# Patient Record
Sex: FEMALE | Race: WHITE | NOT HISPANIC OR LATINO | Employment: OTHER | URBAN - METROPOLITAN AREA
[De-identification: names, ages, dates, MRNs, and addresses within clinical notes are randomized per-mention and may not be internally consistent; named-entity substitution may affect disease eponyms.]

---

## 2017-01-11 ENCOUNTER — ALLSCRIPTS OFFICE VISIT (OUTPATIENT)
Dept: OTHER | Facility: OTHER | Age: 72
End: 2017-01-11

## 2017-07-19 ENCOUNTER — ALLSCRIPTS OFFICE VISIT (OUTPATIENT)
Dept: OTHER | Facility: OTHER | Age: 72
End: 2017-07-19

## 2017-07-19 DIAGNOSIS — Z13.820 ENCOUNTER FOR SCREENING FOR OSTEOPOROSIS: ICD-10-CM

## 2017-07-19 DIAGNOSIS — M85.80 OTHER SPECIFIED DISORDERS OF BONE DENSITY AND STRUCTURE, UNSPECIFIED SITE: ICD-10-CM

## 2017-07-19 DIAGNOSIS — I10 ESSENTIAL (PRIMARY) HYPERTENSION: ICD-10-CM

## 2017-07-19 DIAGNOSIS — E83.52 HYPERCALCEMIA: ICD-10-CM

## 2017-11-10 ENCOUNTER — ALLSCRIPTS OFFICE VISIT (OUTPATIENT)
Dept: OTHER | Facility: OTHER | Age: 72
End: 2017-11-10

## 2017-11-10 DIAGNOSIS — E83.52 HYPERCALCEMIA: ICD-10-CM

## 2017-11-10 DIAGNOSIS — E34.9 ENDOCRINE DISORDER: ICD-10-CM

## 2017-11-10 DIAGNOSIS — I10 ESSENTIAL (PRIMARY) HYPERTENSION: ICD-10-CM

## 2017-11-10 DIAGNOSIS — M85.80 OTHER SPECIFIED DISORDERS OF BONE DENSITY AND STRUCTURE, UNSPECIFIED SITE (CODE): ICD-10-CM

## 2017-11-11 NOTE — PROGRESS NOTES
Assessment    1  Benign essential hypertension (401 1) (I10)   2  Osteopenia (733 90) (M85 80)   3  Elevated parathyroid hormone (259 9) (E34 9)   4  Hypercalcemia (275 42) (E83 52)    Plan  Benign essential hypertension    · AmLODIPine Besylate 5 MG Oral Tablet (Norvasc); TAKE 1 TABLET DAILY   · Valsartan 160 MG Oral Tablet (Diovan); TAKE 1 TABLET ONCE DAILY  Benign essential hypertension, Elevated parathyroid hormone, Hypercalcemia, Osteopenia    · (1) CBC/PLT/DIFF; Status:Active; Requested for:10Nov2017;    · (1) COMPREHENSIVE METABOLIC PANEL; Status:Active; Requested for:10Nov2017;    · (1) LIPID PANEL, FASTING; Status:Active; Requested for:10Nov2017;    · (1) VITAMIN D 25-HYDROXY; Status:Active; Requested for:10Nov2017;   Benign essential hypertension, Hypercalcemia, Osteopenia    · (1) PHOSPHORUS; Status:Active; Requested for:10Nov2017;   Elevated parathyroid hormone, Hypercalcemia, Hypercholesterolemia, Osteopenia    · *1 - [facility]  *  Status: Active  Requested for: 22MIB9895  Care Summary provided  : Yes  PMH: Screening for osteoporosis    · * DXA BONE DENSITY SPINE HIP AND PELVIS; Status:Active; Requested for:10Nov2017;   PMH: Visit for screening mammogram    · * MAMMO SCREENING BILATERAL W CAD; Status:Active; Requested for:10Nov2017;     Discussion/Summary  Discussion Summary:   HTN: well controlled  continue to sporadically monitor BP  Goal BP < 140/90  continue with healthy diet, exercise as tolerated  PTH/Hypercalcemia: Pt stopped her HCTZ, vitamin D and oral CA supplement  ical and PTH with slight elevation  Pt wishes to f/u with endo, will place referral  Add phos to labs  Aware over due for dexa  to f/u in 4 months, sooner if needed  labs with pt  Pt aware needs to update dexa, echo and mammo     Counseling Documentation With Imm: The patient was counseled regarding diagnostic results,-- instructions for management,-- risk factor reductions,-- prognosis,-- patient and family education,-- impressions  Chief Complaint  Chief Complaint Free Text Note Form: Patient is here for a 4 m f/u visit for hypertension, hyperlipidemia and osteopenia  Review labs  Refill meds  Recommended a mammogram and DEXA scan  Advance Directives  Advance Directive [facility]:   The patient is in agreement to receive the Advance Care Planning service--   YES - Patient has an advance health care directive  The patient has a living will located  in patient's home--   Will bring in at next visit  History of Present Illness  HPI: Pt her for routine f/u and to review labs  Pt is doing well with no concerns  pt HCTZ, vitamin D and oral calcium was stopped  here to review ical and PTH  No recent DEXA - aware she should update  [facility] osteopenia  Hypertension (Follow-Up): The patient presents for follow-up of essential hypertension  The patient states she has been doing well with her blood pressure control since the last visit  She has no significant interval events  Symptoms: denies impaired vision,-- denies dyspnea,-- denies chest pain,-- denies intermittent leg claudication-- and-- denies lower extremity edema  Associated symptoms include no headache  Home monitoring: The patient checks her blood pressure sporadically  Blood pressure control has been good  Medications: the patient is adherent with her medication regimen  -- She denies medication side effects  The patient is due for an eye exam   Additional History: Due for an eye exam       Review of Systems  Complete-Female:  Constitutional: no fever,-- not feeling poorly-- and-- no chills  ENT: no earache,-- no sore throat-- and-- no nasal discharge  Cardiovascular: lower extremity edema, but-- the heart rate was not slow,-- no chest pain,-- no intermittent leg claudication,-- the heart rate was not fast-- and-- no palpitations  Respiratory: no shortness of breath,-- no cough-- and-- no shortness of breath during exertion    Gastrointestinal: no abdominal pain,-- no nausea,-- no vomiting-- and-- no constipation--   The patient presents with complaints of diarrhea (looser stools associated with stress)  Genitourinary: no dysuria  Integumentary: no rashes  Neurological: no headache,-- no confusion-- and-- no dizziness  Active Problems  1  Benign essential hypertension (401 1) (I10)   2  Family history of colon cancer (V16 0) (Z80 0)   3  Heart murmur (785 2) (R01 1)   4  History of colon polyps (V12 72) (Z86 010)   5  Hypercalcemia (275 42) (E83 52)   6  Hypercholesterolemia (272 0) (E78 00)   7  Osteopenia (733 90) (M85 80)    Past Medical History  1  History of Asymptomatic postmenopausal status (age-related) (natural) (V49 81) (Z78 0)   2  History of Encounter for screening colonoscopy (V76 51) (Z12 11)   3  History of Functional murmur (R01 0)   4  History of allergy (V15 09) (Z88 9)   5  History of dizziness (V13 89) (Z87 898)   6  History of hypertension (V12 59) (Z86 79)   7  History of screening mammography (V15 89) (Z92 89)   8  Denied: History of Mental Status Change   9  History of Pap smear for cervical cancer screening (V76 2) (Z12 4)   10  History of Screening for malignant neoplasm of breast (V76 10) (Z12 31)   11  History of Screening for osteoporosis (V82 81) (B86 218)  Active Problems And Past Medical History Reviewed: The active problems and past medical history were reviewed and updated today  Surgical History    1  History of  Section   2  History of Tonsillectomy With Adenoidectomy  Surgical History Reviewed: The surgical history was reviewed and updated today  Family History  Mother    1  Family history of Colon cancer   2  Denied: Family history of Crohn's disease without complication, unspecified gastrointestinal tract location   3  Family history of Family Health Status 4  Children Living   4  Family history of GI malignancy (V16 0) (Z80 0)   5  Denied: Family history of liver disease  Father    10  Denied: Family history of Colon cancer   7  Denied: Family history of Crohn's disease without complication, unspecified gastrointestinal tract location   8  Denied: Family history of liver disease  Family History    9  Family history of Colon Cancer (V16 0)   10  Family history of Family Health Status 4  Children Living  Family History Reviewed: The family history was reviewed and updated today  Social History     · Being A Social Drinker   · Caffeine Use   · Denied: History of Drug Use   · Exercising Regularly   · Marital History - Currently    · Never A Smoker   · Occupation:   · Recreational Activities  Social History Reviewed: The social history was reviewed and updated today  The social history was reviewed and is unchanged  Current Meds   1  AmLODIPine Besylate 5 MG Oral Tablet; TAKE 1 TABLET DAILY; Therapy: 15PVS7782 to (Evaluate:15Jan2018)  Requested for: 56KTM5758; Last Rx:74Sqz3410 Ordered   2  Biotin 5000 MCG Oral Capsule; TAKE 1 CAPSULE DAILY; Therapy: 94ZWJ0119 to Recorded   3  Boswellia Shanice Extract Powder; USE AS DIRECTED; Therapy: 86STH7467 to Recorded   4  Turmeric Curcumin Oral Capsule; TAKE 2 CAPSULE Daily; Therapy: 23XKM5673 to Recorded   5  Valsartan 160 MG Oral Tablet; TAKE 1 TABLET ONCE DAILY; Therapy: 73AWH7515 to (Evaluate:15Jan2018)  Requested for: 31HJN1479; Last Rx:44Aym2379 Ordered  Medication List Reviewed: The medication list was reviewed and updated today  Allergies  1  No Known Drug Allergies  2  No Known Environmental Allergies   3  No Known Food Allergies    Vitals  Vital Signs    Recorded: 39HKZ9893 03:37PM   Temperature 97 4 F, Oral   Heart Rate 51   Systolic 315, LUE, Sitting   Diastolic 70, LUE, Sitting   Height 5 ft 0 25 in   Weight 139 lb 2 oz   BMI Calculated 26 94   BSA Calculated 1 6   O2 Saturation 98       Physical Exam   Constitutional  General appearance: No acute distress, well appearing and well nourished     Eyes  Conjunctiva and lids: No swelling, erythema or discharge  Pupils and irises: Equal, round and reactive to light  Ears, Nose, Mouth, and Throat  Oropharynx: Normal with no erythema, edema, exudate or lesions  -- MMM  Pulmonary  Respiratory effort: No increased work of breathing or signs of respiratory distress  Auscultation of lungs: Clear to auscultation  -- no rhonchi or wheezing  Cardiovascular  Auscultation of heart: Normal rate and rhythm, normal S1 and S2, without murmurs  Examination of extremities for edema and/or varicosities: Abnormal  -- + trace to +1 B/L LE edema  Lymphatic  Palpation of lymph nodes in neck: No lymphadenopathy  Musculoskeletal  Gait and station: Normal    Skin  Skin and subcutaneous tissue: Normal without rashes or lesions  Neurologic no focal deficits  Psychiatric  Orientation to person, place, and time: Normal    Mood and affect: Normal          Results/Data  (1) COMPREHENSIVE METABOLIC PANEL [id number] 54:18GC Hector Prudent     Test Name Result Flag Reference   GLUCOSE,RANDM 102         Summary / No summary entered :    No summary entered  Documents attached :    [address] Work - [facility] pa, team; Enc: [id number] - Image Encounter - [facility] pa, team -    (Interventional Medicine) (Additional Information Document)    Health Management  History of colon polyps   COLONOSCOPY; every 3 years; Last 81RTV9866; Next Due: 94UNC7197; Active  History of Depression screening   *VB-Depression Screening; every 1 year; Last 11YTF1396; Next Due: 84LIK8479; Active  History of Screening for colon cancer   COLONOSCOPY (GI, SURG); every 3 years; Last 78XVB1244; Next Due: 81RBT6299; Active  History of Screening for genitourinary condition   *VB - Urinary Incontinence Screen (Dx Z13 89 Screen for UI); every 1 year; Last 23VPG8813; NextDue: 98IAM1326;  Active  History of Screening for neurological condition   *VB - Fall Risk Assessment  (Dx Z13 89 Screen for Neurologic Disorder); every 1 year; MWMU03GGP1000; Next Due: 43RXW3059; Active    Signatures   Electronically signed by :  Anton Camarillo; Nov 10 2017  4:52PM EST                       (Author)    Electronically signed by : Kalia Reilly MD; Nov 10 2017  6:14PM EST

## 2018-01-12 VITALS
BODY MASS INDEX: 27.58 KG/M2 | OXYGEN SATURATION: 97 % | DIASTOLIC BLOOD PRESSURE: 70 MMHG | TEMPERATURE: 98 F | WEIGHT: 140.5 LBS | HEIGHT: 60 IN | HEART RATE: 78 BPM | SYSTOLIC BLOOD PRESSURE: 110 MMHG

## 2018-01-13 VITALS
HEIGHT: 61 IN | OXYGEN SATURATION: 89 % | DIASTOLIC BLOOD PRESSURE: 62 MMHG | WEIGHT: 141.5 LBS | SYSTOLIC BLOOD PRESSURE: 118 MMHG | BODY MASS INDEX: 26.71 KG/M2 | TEMPERATURE: 97.7 F | HEART RATE: 89 BPM

## 2018-01-14 VITALS
DIASTOLIC BLOOD PRESSURE: 70 MMHG | BODY MASS INDEX: 27.32 KG/M2 | OXYGEN SATURATION: 98 % | SYSTOLIC BLOOD PRESSURE: 117 MMHG | TEMPERATURE: 97.4 F | HEIGHT: 60 IN | HEART RATE: 51 BPM | WEIGHT: 139.13 LBS

## 2018-01-16 NOTE — RESULT NOTES
Message    Colon polyps removed came back as tubular adenomas  Called PT - spoke to  (on CC) - provided DR results - input reminder - [address]      Repeat colonoscopy in 3 years    Spoke with pt, notified of results  Reminder set  tmc             Verified Results  (1) TISSUE EXAM [id number] 01:35PM Emperatriz Master     Test Name Result Flag Reference   LAB AP CASE REPORT (Report)     Surgical Pathology Report             Case: [id number]                   Authorizing Provider: Augustus Julien MD     Collected:      10/10/2016 1335        Ordering Location:   [facility]    Received:      10/10/2016 [address] [facility]                               Pathologist:      Marcia Garcia MD                              Specimens:  A) - Large Intestine, Right/Ascending Colon, Hot snare polypectomy proximal ascending         B) - Large Intestine, Right/Ascending Colon, Bx distal ascending polyp   LAB AP FINAL DIAGNOSIS (Report)     A  Colon, right, polypectomy:        - Tubular adenoma involving multiple tissue fragments         - No high-grade dysplasia or malignancy is identified  B  Colon, distal ascending, polypectomy:        - Tubular adenoma  - No high-grade dysplasia or malignancy is identified  Interpretation performed at , [address]   Electronically signed by Marcia Garcia MD on 10/12/2016 at 12:00 PM   LAB AP SURGICAL ADDITIONAL INFORMATION (Report)     These tests were developed and their performance characteristics   determined by [facility] or [address] [place] [address]  They may not be cleared or approved by the [place]  Food and   Drug Administration  The FDA has determined that such clearance or   approval is not necessary  These tests are used for clinical purposes  They should not be regarded as investigational or for research   This   laboratory has been approved by [facility], designated as a high-complexity   laboratory and is qualified to perform these tests  LAB AP GROSS DESCRIPTION (Report)     A  The specimen is received in formalin, labeled with the patient's name   and hospital number, and is designated hot snare polypectomy proximal   ascending, are multiple tan-brown polypoid structures measuring 1 8 x 0 7   x 0 2 cm in aggregate  Entirely submitted  One cassette  B  The specimen is received in formalin, labeled with the patient's name   and hospital number, and is designated biopsy distal ascending polyp,   are two irregularly shaped fragments of tan soft tissue measuring 0 3 and   0 2 cm in greatest dimension  Entirely submitted  One cassette  Note: The estimated total formalin fixation time based upon information   provided by the submitting clinician and the standard processing schedule   is 30 75 hours        RLR

## 2018-10-11 RX ORDER — AMLODIPINE BESYLATE 5 MG/1
TABLET ORAL
Qty: 90 TABLET | Refills: 1 | OUTPATIENT
Start: 2018-10-11

## 2018-10-24 RX ORDER — VALSARTAN 160 MG/1
TABLET ORAL
Qty: 90 TABLET | Refills: 1 | OUTPATIENT
Start: 2018-10-24

## 2018-10-30 ENCOUNTER — OFFICE VISIT (OUTPATIENT)
Dept: INTERNAL MEDICINE CLINIC | Age: 73
End: 2018-10-30
Payer: COMMERCIAL

## 2018-10-30 VITALS
TEMPERATURE: 98.2 F | OXYGEN SATURATION: 97 % | HEART RATE: 92 BPM | BODY MASS INDEX: 28.55 KG/M2 | WEIGHT: 141.6 LBS | HEIGHT: 59 IN | SYSTOLIC BLOOD PRESSURE: 128 MMHG | DIASTOLIC BLOOD PRESSURE: 82 MMHG

## 2018-10-30 DIAGNOSIS — J06.9 VIRAL UPPER RESPIRATORY TRACT INFECTION: ICD-10-CM

## 2018-10-30 DIAGNOSIS — E83.52 HYPERCALCEMIA: ICD-10-CM

## 2018-10-30 DIAGNOSIS — I10 ESSENTIAL HYPERTENSION: Primary | ICD-10-CM

## 2018-10-30 PROBLEM — M54.50 CHRONIC BILATERAL LOW BACK PAIN WITHOUT SCIATICA: Status: ACTIVE | Noted: 2018-10-30

## 2018-10-30 PROBLEM — G89.29 CHRONIC BILATERAL LOW BACK PAIN WITHOUT SCIATICA: Status: ACTIVE | Noted: 2018-10-30

## 2018-10-30 PROCEDURE — 1036F TOBACCO NON-USER: CPT | Performed by: INTERNAL MEDICINE

## 2018-10-30 PROCEDURE — 99214 OFFICE O/P EST MOD 30 MIN: CPT | Performed by: INTERNAL MEDICINE

## 2018-10-30 RX ORDER — LOSARTAN POTASSIUM 50 MG/1
50 TABLET ORAL DAILY
Qty: 30 TABLET | Refills: 0 | Status: SHIPPED | OUTPATIENT
Start: 2018-10-30 | End: 2018-10-30 | Stop reason: DRUGHIGH

## 2018-10-30 RX ORDER — AMLODIPINE BESYLATE 5 MG/1
5 TABLET ORAL DAILY
Qty: 90 TABLET | Refills: 1 | Status: SHIPPED | OUTPATIENT
Start: 2018-10-30 | End: 2019-04-29 | Stop reason: SDUPTHER

## 2018-10-30 RX ORDER — VALSARTAN 160 MG/1
160 TABLET ORAL DAILY
Qty: 90 TABLET | Refills: 1 | Status: CANCELLED | OUTPATIENT
Start: 2018-10-30

## 2018-10-30 RX ORDER — LOSARTAN POTASSIUM 100 MG/1
100 TABLET ORAL DAILY
Qty: 30 TABLET | Refills: 0 | Status: SHIPPED | OUTPATIENT
Start: 2018-10-30 | End: 2018-11-19 | Stop reason: SDUPTHER

## 2018-10-30 RX ORDER — VALSARTAN 160 MG/1
160 TABLET ORAL DAILY
Refills: 1 | COMMUNITY
Start: 2018-07-28 | End: 2018-10-30 | Stop reason: ALTCHOICE

## 2018-10-30 RX ORDER — LOSARTAN POTASSIUM 100 MG/1
100 TABLET ORAL DAILY
Refills: 0 | Status: CANCELLED | OUTPATIENT
Start: 2018-10-30

## 2018-10-30 NOTE — PROGRESS NOTES
Assessment/Plan:    Essential hypertension  - well controlled on valsartan and amlodipine  - patient's lab results were not available for review but she states that she will find the results and fax to our office for review tomorrow  - will discontinue valsartan 160 mg because it has been recalled and start patient on losartan 100 mg daily  - patient has been counseled to check her blood pressure twice daily, once in the morning and again in the evening and to keep a blood pressure log for at least 2 weeks and fax it to the office in 2 weeks  - will refill amlodipine  - follow-up in 3 months    Viral upper respiratory tract infection  - almost completely resolved  - continue with over-the-counter medication as needed    Hypercalcemia  - will review the results of her lab work when we get the results         Diagnoses and all orders for this visit:    Essential hypertension  -     amLODIPine (NORVASC) 5 mg tablet; Take 1 tablet (5 mg total) by mouth daily  -     Discontinue: losartan (COZAAR) 50 mg tablet; Take 1 tablet (50 mg total) by mouth daily  -     losartan (COZAAR) 100 MG tablet; Take 1 tablet (100 mg total) by mouth daily    Viral upper respiratory tract infection    Hypercalcemia    Other orders  -     Ascorbic Acid (VITAMIN C PO); Take 1 tablet by mouth daily  -     Discontinue: valsartan (DIOVAN) 160 mg tablet; Take 160 mg by mouth daily  -     Cancel: valsartan (DIOVAN) 160 mg tablet; Take 1 tablet (160 mg total) by mouth daily  -     Cancel: losartan (COZAAR) 100 MG tablet; Take 1 tablet (100 mg total) by mouth daily          Subjective:      Patient ID: Lula Ocampo is a 72 y o  female  HPI  Patient is here for follow-up visit regarding her hypertension  She needs a refill of her blood pressure medications-valsartan and amlodipine    She also wants to review the results of the blood work she did recently unfortunately, there is no record of the blood test results either on epic or in her medical records  She states that she had the blood work done in her work place  She has no complaints today  She states that she recently had symptoms of an upper respiratory tract infection with runny nose, stuffy nose, cough, sinus pressure but her symptoms have improved since she started taking vitamin-C  She admits to occasional arthralgias and some leg swelling but denies headaches, chest pains, fever, chills, night sweats, palpitations, nausea, vomiting, abdominal pain, diarrhea, constipation  She states that she was recently switched from valsartan HCT to plain valsartan because of low potassium levels but her blood pressure has been well controlled with the switch  The following portions of the patient's history were reviewed and updated as appropriate: allergies, current medications, past family history, past medical history, past social history, past surgical history and problem list     Review of Systems   Constitutional: Negative for activity change, chills, fatigue, fever and unexpected weight change  HENT: Negative for ear pain, postnasal drip, rhinorrhea, sinus pressure and sore throat  Eyes: Negative for pain  Respiratory: Negative for cough, choking, chest tightness, shortness of breath and wheezing  Cardiovascular: Negative for chest pain, palpitations and leg swelling  Gastrointestinal: Negative for abdominal pain, constipation, diarrhea, nausea and vomiting  Genitourinary: Negative for dysuria and hematuria  Musculoskeletal: Positive for arthralgias  Negative for back pain, gait problem, joint swelling, myalgias and neck stiffness  Skin: Negative for pallor and rash  Neurological: Negative for dizziness, tremors, seizures, syncope, light-headedness and headaches  Hematological: Negative for adenopathy  Psychiatric/Behavioral: Negative for behavioral problems           Past Medical History:   Diagnosis Date    Arthritis     Family history of colon cancer in mother  Functional murmur     Hypertension     Dx'd in , controlled     Osteoporosis          Current Outpatient Prescriptions:     Ascorbic Acid (VITAMIN C PO), Take 1 tablet by mouth daily, Disp: , Rfl:     amLODIPine (NORVASC) 5 mg tablet, Take 1 tablet (5 mg total) by mouth daily, Disp: 90 tablet, Rfl: 1    losartan (COZAAR) 100 MG tablet, Take 1 tablet (100 mg total) by mouth daily, Disp: 30 tablet, Rfl: 0    No Known Allergies    Social History   Past Surgical History:   Procedure Laterality Date     SECTION      x2,  &     FLEXIBLE SIGMOIDOSCOPY      MA COLONOSCOPY FLX DX W/COLLJ Sokokirsten  PFRMD N/A 10/10/2016    Procedure: COLONOSCOPY;  Surgeon: Naheed Tijerina MD;  [facility]; Service: Gastroenterology    TOE SURGERY Bilateral     hammer toe correction surgery    TONSILLECTOMY      with Adenoidectomy     Family History   Problem Relation Age of Onset    Colon cancer Mother 68    Colon cancer Family        Objective:  /82 (BP Location: Left arm, Patient Position: Sitting, Cuff Size: Standard)   Pulse 92   Temp 98 2 °F (36 8 °C) (Tympanic)   Ht 4' 11 45" (1 51 m)   Wt 64 2 kg (141 lb 9 6 oz)   SpO2 97%   BMI 28 17 kg/m²        Physical Exam   Constitutional: She is oriented to person, place, and time  She appears well-developed and well-nourished  No distress  HENT:   Head: Normocephalic and atraumatic  Right Ear: External ear normal    Left Ear: External ear normal    Nose: Nose normal    Mouth/Throat: Oropharynx is clear and moist  No oropharyngeal exudate  Nasal mucosa edema and mild erythema   Eyes: Pupils are equal, round, and reactive to light  Conjunctivae and EOM are normal  Right eye exhibits no discharge  Left eye exhibits no discharge  No scleral icterus  Neck: Normal range of motion  Neck supple  No JVD present  No tracheal deviation present  No thyromegaly present  Cardiovascular: Normal rate, regular rhythm and intact distal pulses  Exam reveals no gallop and no friction rub  Murmur (2/6 systolic murmur maximal in aortic valve region) heard  Pulmonary/Chest: Effort normal and breath sounds normal  No respiratory distress  She has no wheezes  She has no rales  She exhibits no tenderness  Abdominal: Soft  Bowel sounds are normal  She exhibits no distension and no mass  There is no tenderness  There is no rebound and no guarding  Musculoskeletal: Normal range of motion  She exhibits edema (1+ pitting pedal edema on bilateral lower extremities extending up to mid shins)  She exhibits no tenderness or deformity  Lymphadenopathy:     She has no cervical adenopathy  Neurological: She is alert and oriented to person, place, and time  She has normal reflexes  No cranial nerve deficit  She exhibits normal muscle tone  Coordination normal    Skin: Skin is warm and dry  No rash noted  She is not diaphoretic  No erythema  No pallor  Psychiatric: She has a normal mood and affect   Her behavior is normal

## 2018-11-06 ENCOUNTER — TELEPHONE (OUTPATIENT)
Dept: INTERNAL MEDICINE CLINIC | Facility: CLINIC | Age: 73
End: 2018-11-06

## 2018-11-06 DIAGNOSIS — Z91.89 AT RISK FOR HEPATOTOXICITY: ICD-10-CM

## 2018-11-06 DIAGNOSIS — E78.49 OTHER HYPERLIPIDEMIA: Primary | ICD-10-CM

## 2018-11-06 PROBLEM — E55.9 VITAMIN D INSUFFICIENCY: Status: ACTIVE | Noted: 2018-11-06

## 2018-11-06 NOTE — TELEPHONE ENCOUNTER
I called and spoke to patient about her lab results  She has vit D insufficiency and hyperlipidemia  We will check her LFT and pt will be started on atorvastatin 10-20 mg if her liver function is normal because her calculated 10 year ASCVD risk is 11 8%  Pt will start OTC vit D 2000 units daily  We will recheck her vit level two months and her LFT again one month after she starts the statin

## 2018-11-08 ENCOUNTER — TELEPHONE (OUTPATIENT)
Dept: INTERNAL MEDICINE CLINIC | Age: 73
End: 2018-11-08

## 2018-11-16 NOTE — PROGRESS NOTES
[facility] Dr Dean Low  I just wanted to make you aware of these results as you saw this patient 10/30  Please let me know if there is anything that you need    Thanks Lacey Cooper

## 2018-11-19 ENCOUNTER — TELEPHONE (OUTPATIENT)
Dept: INTERNAL MEDICINE CLINIC | Age: 73
End: 2018-11-19

## 2018-11-19 ENCOUNTER — OFFICE VISIT (OUTPATIENT)
Dept: INTERNAL MEDICINE CLINIC | Age: 73
End: 2018-11-19
Payer: COMMERCIAL

## 2018-11-19 VITALS
OXYGEN SATURATION: 97 % | WEIGHT: 140.4 LBS | HEART RATE: 83 BPM | HEIGHT: 59 IN | TEMPERATURE: 98.4 F | SYSTOLIC BLOOD PRESSURE: 120 MMHG | DIASTOLIC BLOOD PRESSURE: 70 MMHG | BODY MASS INDEX: 28.3 KG/M2

## 2018-11-19 DIAGNOSIS — I10 ESSENTIAL HYPERTENSION: Primary | ICD-10-CM

## 2018-11-19 PROCEDURE — 99214 OFFICE O/P EST MOD 30 MIN: CPT | Performed by: NURSE PRACTITIONER

## 2018-11-19 PROCEDURE — 1160F RVW MEDS BY RX/DR IN RCRD: CPT | Performed by: NURSE PRACTITIONER

## 2018-11-19 PROCEDURE — 3074F SYST BP LT 130 MM HG: CPT | Performed by: NURSE PRACTITIONER

## 2018-11-19 PROCEDURE — 3008F BODY MASS INDEX DOCD: CPT | Performed by: NURSE PRACTITIONER

## 2018-11-19 PROCEDURE — 3078F DIAST BP <80 MM HG: CPT | Performed by: NURSE PRACTITIONER

## 2018-11-19 RX ORDER — LOSARTAN POTASSIUM 50 MG/1
50 TABLET ORAL DAILY
COMMUNITY
End: 2018-11-19 | Stop reason: CLARIF

## 2018-11-19 RX ORDER — LOSARTAN POTASSIUM 100 MG/1
100 TABLET ORAL DAILY
Qty: 30 TABLET | Refills: 0 | Status: SHIPPED | OUTPATIENT
Start: 2018-11-19 | End: 2018-12-05 | Stop reason: SDUPTHER

## 2018-11-19 RX ORDER — HYDROCHLOROTHIAZIDE 12.5 MG/1
12.5 TABLET ORAL DAILY
Qty: 30 TABLET | Refills: 0 | Status: SHIPPED | OUTPATIENT
Start: 2018-11-19 | End: 2018-12-05 | Stop reason: SDUPTHER

## 2018-11-19 NOTE — TELEPHONE ENCOUNTER
Patient called and stated that ever since her BP med Valsartan 160--was changed to -Losartan 100mg once a day, 50mg once a day    BP has been in the 131-149/100-115 range    PLEASE CALL to let her know what she can do with her medication to lower it

## 2018-11-19 NOTE — PROGRESS NOTES
Assessment/Plan:    Essential hypertension   Patient's blood pressure stable at 120/70, manual /70  Patient is to continue to just take losartan 100 milligram tablet daily with hydrochlorothiazide 12 5 milligram tablet daily  Patient was mistakenly taken 150 milligrams of losartan daily, and I believe this was causing some dizziness  I did advise patient to change positions slowly  Due to starting hydrochlorothiazide will check a BNP prior to her follow-up in 2 weeks  Patient's blood pressure cuff in accurate,  Home blood pressure cuff checked while in office today,   May be inaccurate due to murmur  Did advise patient to follow-up with cardiology as she has seen them in the past  Continue to monitor blood pressure at home  Goal BP is < 130/80  Contact our office for consistent elevations  Recommend low sodium diet  Exercise 30 minutes three times a week as tolerated  Recommend yearly eye exam          Diagnoses and all orders for this visit:    Essential hypertension  -     hydrochlorothiazide (HYDRODIURIL) 12 5 mg tablet; Take 1 tablet (12 5 mg total) by mouth daily  -     losartan (COZAAR) 100 MG tablet; Take 1 tablet (100 mg total) by mouth daily  -     Basic metabolic panel; Future    Other orders  -     Discontinue: losartan (COZAAR) 50 mg tablet; Take 50 mg by mouth daily          Subjective:      Patient ID: Katherine Soriano is a 72 y o  female  Patient presents today to follow-up on hypertension, patient is currently taking losartan 100 mg tablet daily, and amlodipine 5 mg tablet daily  Patient's blood pressure medication was changed on 10/30/18 at appointment with Dr Dori Schirmer  Due to valsartan recall, patient was originally taking valsartan 160 mg and was changed to losartan 100 mg once a day  Patient states her BP has been in the 131-149/100-115 range when monitored at home     Patient states that she has been taking losartan 100 milligrams and losartan 50 milligrams for total of 150 milligrams every day  Dizzy lately when she bends over and stands up  Patient's home blood pressure cuff checked while in office today, blood pressure reading from patient's home cuff was 164/108, manual blood pressure 130/70  Hypertension   This is a chronic problem  The current episode started more than 1 year ago  The problem has been gradually improving since onset  The problem is controlled  Pertinent negatives include no anxiety, blurred vision, chest pain, headaches, malaise/fatigue, neck pain, orthopnea, palpitations, peripheral edema, PND, shortness of breath or sweats  Risk factors for coronary artery disease include sedentary lifestyle and dyslipidemia  Treatments tried:   Patient currently taking amlodipine as well as losartan  The current treatment provides mild improvement  Compliance problems include diet and exercise  The following portions of the patient's history were reviewed and updated as appropriate: allergies, current medications, past family history, past medical history, past social history, past surgical history and problem list     Review of Systems   Constitutional: Negative for activity change, appetite change, chills, diaphoresis, fever and malaise/fatigue  HENT: Negative for congestion, ear discharge, ear pain, postnasal drip, rhinorrhea, sinus pain, sinus pressure and sore throat  Eyes: Negative for blurred vision, pain, discharge, itching and visual disturbance  Respiratory: Negative for cough, chest tightness, shortness of breath and wheezing  Cardiovascular: Negative for chest pain, palpitations, orthopnea, leg swelling and PND  Gastrointestinal: Negative for abdominal pain, constipation, diarrhea, nausea and vomiting  Endocrine: Negative for polydipsia, polyphagia and polyuria  Genitourinary: Negative for difficulty urinating, dysuria and urgency  Musculoskeletal: Negative for arthralgias, back pain and neck pain     Skin: Negative for rash and wound    Neurological: Negative for dizziness, weakness, numbness and headaches  Past Medical History:   Diagnosis Date    Arthritis     Family history of colon cancer in mother     Functional murmur     Hypertension     Dx'd in , controlled     Osteoporosis          Current Outpatient Prescriptions:     amLODIPine (NORVASC) 5 mg tablet, Take 1 tablet (5 mg total) by mouth daily, Disp: 90 tablet, Rfl: 1    Ascorbic Acid (VITAMIN C PO), Take 1 tablet by mouth daily, Disp: , Rfl:     losartan (COZAAR) 100 MG tablet, Take 1 tablet (100 mg total) by mouth daily, Disp: 30 tablet, Rfl: 0    hydrochlorothiazide (HYDRODIURIL) 12 5 mg tablet, Take 1 tablet (12 5 mg total) by mouth daily, Disp: 30 tablet, Rfl: 0    No Known Allergies    Social History   Past Surgical History:   Procedure Laterality Date     SECTION      x2,  &     FLEXIBLE SIGMOIDOSCOPY      [place] COLONOSCOPY FLX DX W/COLLJ Adriana  PFRMD N/A 10/10/2016    Procedure: COLONOSCOPY;  Surgeon: Makeda Angel MD;  Location: [facility]; Service: Gastroenterology    TOE SURGERY Bilateral     hammer toe correction surgery    TONSILLECTOMY      with Adenoidectomy     Family History   Problem Relation Age of Onset    Colon cancer Mother 68    Colon cancer Family        Objective:  /70 (BP Location: Left arm, Patient Position: Sitting, Cuff Size: Adult)   Pulse 83   Temp 98 4 °F (36 9 °C) (Tympanic)   Ht 4' 11" (1 499 m)   Wt 63 7 kg (140 lb 6 4 oz)   SpO2 97%   BMI 28 36 kg/m²     No results found for this or any previous visit (from the past 1344 hour(s))  Physical Exam   Constitutional: She is oriented to person, place, and time  She appears well-developed and well-nourished  No distress  HENT:   Head: Normocephalic and atraumatic  Right Ear: External ear normal    Left Ear: External ear normal    Nose: Nose normal    Mouth/Throat: Oropharynx is clear and moist  No oropharyngeal exudate     Eyes: Pupils are equal, round, and reactive to light  Conjunctivae and EOM are normal  Right eye exhibits no discharge  Left eye exhibits no discharge  No scleral icterus  Neck: Normal range of motion  Neck supple  No JVD present  No tracheal deviation present  No thyromegaly present  Cardiovascular: Normal rate, regular rhythm and intact distal pulses  Exam reveals no gallop and no friction rub  Murmur (2/6 systolic murmur maximal in aortic valve region) heard  Pulmonary/Chest: Effort normal and breath sounds normal  No respiratory distress  She has no wheezes  She has no rales  She exhibits no tenderness  Abdominal: Soft  Bowel sounds are normal  She exhibits no distension and no mass  There is no tenderness  There is no rebound and no guarding  Musculoskeletal: Normal range of motion  She exhibits edema (1+ pitting pedal edema on bilateral lower extremities extending up to mid shins)  She exhibits no tenderness or deformity  Lymphadenopathy:     She has no cervical adenopathy  Neurological: She is alert and oriented to person, place, and time  She has normal reflexes  No cranial nerve deficit  She exhibits normal muscle tone  Coordination normal    Skin: Skin is warm and dry  No rash noted  She is not diaphoretic  No erythema  No pallor  Psychiatric: She has a normal mood and affect   Her behavior is normal

## 2018-11-19 NOTE — TELEPHONE ENCOUNTER
Please see if she can come in today for an appointment   Please advise her to bring her blood pressure log with her

## 2018-11-19 NOTE — TELEPHONE ENCOUNTER
Spoke with the patient and she will be coming in for today at 3:30 p m  At the bath office with Samra Lemus      Patient will be bring her blood pressure machine and the log that she has taken so far on the blood pressure

## 2018-11-19 NOTE — ASSESSMENT & PLAN NOTE
Patient's blood pressure stable at 120/70, manual /70  Patient is to continue to just take losartan 100 milligram tablet daily with hydrochlorothiazide 12 5 milligram tablet daily  Patient was mistakenly taken 150 milligrams of losartan daily, and I believe this was causing some dizziness  I did advise patient to change positions slowly  Due to starting hydrochlorothiazide will check a BNP prior to her follow-up in 2 weeks  Patient's blood pressure cuff in accurate,  Home blood pressure cuff checked while in office today,   May be inaccurate due to murmur  Did advise patient to follow-up with cardiology as she has seen them in the past  Continue to monitor blood pressure at home  Goal BP is < 130/80  Contact our office for consistent elevations  Recommend low sodium diet  Exercise 30 minutes three times a week as tolerated    Recommend yearly eye exam

## 2018-12-05 ENCOUNTER — OFFICE VISIT (OUTPATIENT)
Dept: INTERNAL MEDICINE CLINIC | Age: 73
End: 2018-12-05
Payer: COMMERCIAL

## 2018-12-05 VITALS
HEART RATE: 88 BPM | OXYGEN SATURATION: 98 % | HEIGHT: 59 IN | DIASTOLIC BLOOD PRESSURE: 82 MMHG | SYSTOLIC BLOOD PRESSURE: 117 MMHG | BODY MASS INDEX: 28.18 KG/M2 | WEIGHT: 139.8 LBS | TEMPERATURE: 97.4 F

## 2018-12-05 DIAGNOSIS — I10 ESSENTIAL HYPERTENSION: ICD-10-CM

## 2018-12-05 DIAGNOSIS — M81.0 OSTEOPOROSIS, UNSPECIFIED OSTEOPOROSIS TYPE, UNSPECIFIED PATHOLOGICAL FRACTURE PRESENCE: Primary | ICD-10-CM

## 2018-12-05 DIAGNOSIS — E78.49 OTHER HYPERLIPIDEMIA: ICD-10-CM

## 2018-12-05 DIAGNOSIS — Z12.39 BREAST CANCER SCREENING: ICD-10-CM

## 2018-12-05 DIAGNOSIS — Z13.820 SCREENING FOR OSTEOPOROSIS: ICD-10-CM

## 2018-12-05 DIAGNOSIS — E55.9 VITAMIN D INSUFFICIENCY: ICD-10-CM

## 2018-12-05 DIAGNOSIS — Z12.39 SCREENING FOR BREAST CANCER: ICD-10-CM

## 2018-12-05 PROCEDURE — 99213 OFFICE O/P EST LOW 20 MIN: CPT | Performed by: NURSE PRACTITIONER

## 2018-12-05 PROCEDURE — 3074F SYST BP LT 130 MM HG: CPT | Performed by: NURSE PRACTITIONER

## 2018-12-05 PROCEDURE — 3079F DIAST BP 80-89 MM HG: CPT | Performed by: NURSE PRACTITIONER

## 2018-12-05 PROCEDURE — 1160F RVW MEDS BY RX/DR IN RCRD: CPT | Performed by: NURSE PRACTITIONER

## 2018-12-05 PROCEDURE — 3008F BODY MASS INDEX DOCD: CPT | Performed by: NURSE PRACTITIONER

## 2018-12-05 RX ORDER — LOSARTAN POTASSIUM 100 MG/1
100 TABLET ORAL DAILY
Qty: 90 TABLET | Refills: 1 | Status: SHIPPED | OUTPATIENT
Start: 2018-12-05 | End: 2019-05-07 | Stop reason: SDUPTHER

## 2018-12-05 RX ORDER — HYDROCHLOROTHIAZIDE 12.5 MG/1
12.5 TABLET ORAL DAILY
Qty: 90 TABLET | Refills: 1 | Status: SHIPPED | OUTPATIENT
Start: 2018-12-05 | End: 2019-05-07 | Stop reason: SDUPTHER

## 2018-12-05 NOTE — ASSESSMENT & PLAN NOTE
Patient's blood stable  Patient is to continue to take losartan 100 milligram tablet daily with hydrochlorothiazide 12 5 milligram tablet daily  Patient did get blood work done prior to appointment today however we have yet to receive the results, patient states she  Will send over BMP results  Continue to monitor blood pressure at home  Goal BP is < 130/80  Contact our office for consistent elevations  Recommend low sodium diet  Exercise 30 minutes three times a week as tolerated    Recommend yearly eye exam

## 2018-12-05 NOTE — ASSESSMENT & PLAN NOTE
Patient will continues take fish oil 2000 mg daily, will get a repeat lipid panel at patient's follow-up appointment in approximately 3 months  Recommend healthy lifestyle choices for your cholesterol  Low fat/low cholesterol diet  Limit/avoid red meat  Eat more lean meat - chicken breast, ground turkey, fish  Exercise 30 mins at least 3 times a week as tolerated

## 2018-12-05 NOTE — PROGRESS NOTES
Assessment/Plan:    Essential hypertension   Patient's blood stable  Patient is to continue to take losartan 100 milligram tablet daily with hydrochlorothiazide 12 5 milligram tablet daily  Patient did get blood work done prior to appointment today however we have yet to receive the results, patient states she  Will send over BMP results  Continue to monitor blood pressure at home  Goal BP is < 130/80  Contact our office for consistent elevations  Recommend low sodium diet  Exercise 30 minutes three times a week as tolerated  Recommend yearly eye exam       Vitamin D insufficiency  Patient will continue with vit D supplementation  Other hyperlipidemia   Patient will continues take fish oil 2000 mg daily, will get a repeat lipid panel at patient's follow-up appointment in approximately 3 months  Recommend healthy lifestyle choices for your cholesterol  Low fat/low cholesterol diet  Limit/avoid red meat  Eat more lean meat - chicken breast, ground turkey, fish  Exercise 30 mins at least 3 times a week as tolerated  Diagnoses and all orders for this visit:    Osteoporosis, unspecified osteoporosis type, unspecified pathological fracture presence    Breast cancer screening    Essential hypertension  -     losartan (COZAAR) 100 MG tablet; Take 1 tablet (100 mg total) by mouth daily  -     hydrochlorothiazide (HYDRODIURIL) 12 5 mg tablet; Take 1 tablet (12 5 mg total) by mouth daily    Screening for osteoporosis  -     DXA bone density spine hip and pelvis; Future    Screening for breast cancer  -     Mammo screening bilateral w cad; Future    Vitamin D insufficiency    Other hyperlipidemia    Other orders  -     Cancel: Mammo screening bilateral w cad; Future  -     Cancel: DXA bone density spine hip and pelvis; Future          Subjective:      Patient ID: Catalina Maki is a 72 y o  female        Patient presents today to follow-up on hypertension, patient is currently taking losartan 100 mg tablet daily, and amlodipine 5 mg tablet daily and   Hydrochlorothiazide 12 5 mg  Patient states she is feeling much better on this regimen  She states that the swelling in her ankles has gone down, and the dizziness has completely resolved  Hypertension   This is a chronic problem  The current episode started more than 1 year ago  The problem is unchanged  The problem is controlled  Pertinent negatives include no anxiety, blurred vision, chest pain, headaches, malaise/fatigue, neck pain, orthopnea, palpitations, peripheral edema, PND, shortness of breath or sweats  Risk factors for coronary artery disease include sedentary lifestyle and dyslipidemia  Treatments tried:   Patient currently taking amlodipine as well as losartan  And hydrochlorothiazide  The current treatment provides mild improvement  Compliance problems include diet and exercise  The following portions of the patient's history were reviewed and updated as appropriate: allergies, current medications, past family history, past medical history, past social history, past surgical history and problem list     Review of Systems   Constitutional: Negative for activity change, appetite change, chills, diaphoresis, fever and malaise/fatigue  HENT: Negative for congestion, ear discharge, ear pain, postnasal drip, rhinorrhea, sinus pain, sinus pressure and sore throat  Eyes: Negative for blurred vision, pain, discharge, itching and visual disturbance  Respiratory: Negative for cough, chest tightness, shortness of breath and wheezing  Cardiovascular: Negative for chest pain, palpitations, orthopnea, leg swelling and PND  Gastrointestinal: Negative for abdominal pain, constipation, diarrhea, nausea and vomiting  Endocrine: Negative for polydipsia, polyphagia and polyuria  Genitourinary: Negative for difficulty urinating, dysuria and urgency  Musculoskeletal: Negative for arthralgias, back pain and neck pain     Skin: Negative for rash and wound  Neurological: Negative for dizziness, weakness, numbness and headaches  Past Medical History:   Diagnosis Date    Arthritis     Family history of colon cancer in mother     Functional murmur     Hypertension     Dx'd in , controlled     Osteoporosis          Current Outpatient Prescriptions:     amLODIPine (NORVASC) 5 mg tablet, Take 1 tablet (5 mg total) by mouth daily, Disp: 90 tablet, Rfl: 1    Ascorbic Acid (VITAMIN C PO), Take 1 tablet by mouth daily, Disp: , Rfl:     hydrochlorothiazide (HYDRODIURIL) 12 5 mg tablet, Take 1 tablet (12 5 mg total) by mouth daily, Disp: 90 tablet, Rfl: 1    losartan (COZAAR) 100 MG tablet, Take 1 tablet (100 mg total) by mouth daily, Disp: 90 tablet, Rfl: 1    No Known Allergies    Social History   Past Surgical History:   Procedure Laterality Date     SECTION      x2,  &     FLEXIBLE SIGMOIDOSCOPY      [place] COLONOSCOPY FLX DX W/COLLJ Adriana  PFRMD N/A 10/10/2016    Procedure: COLONOSCOPY;  Surgeon: Zoie Hammond MD;  Location: [facility]; Service: Gastroenterology    TOE SURGERY Bilateral     hammer toe correction surgery    TONSILLECTOMY      with Adenoidectomy     Family History   Problem Relation Age of Onset    Colon cancer Mother 68    Colon cancer Family        Objective:  /82 (BP Location: Left arm, Patient Position: Sitting, Cuff Size: Standard)   Pulse 88   Temp (!) 97 4 °F (36 3 °C) (Tympanic)   Ht 4' 11 25" (1 505 m)   Wt 63 4 kg (139 lb 12 8 oz)   SpO2 98%   BMI 28 00 kg/m²     No results found for this or any previous visit (from the past 1344 hour(s))  Physical Exam   Constitutional: She is oriented to person, place, and time  She appears well-developed and well-nourished  No distress  HENT:   Head: Normocephalic and atraumatic     Right Ear: External ear normal    Left Ear: External ear normal    Nose: Nose normal    Mouth/Throat: Oropharynx is clear and moist  No oropharyngeal exudate  Eyes: Pupils are equal, round, and reactive to light  Conjunctivae and EOM are normal  Right eye exhibits no discharge  Left eye exhibits no discharge  No scleral icterus  Neck: Normal range of motion  Neck supple  No JVD present  No tracheal deviation present  No thyromegaly present  Cardiovascular: Normal rate, regular rhythm and intact distal pulses  Exam reveals no gallop and no friction rub  Murmur (2/6 systolic murmur maximal in aortic valve region) heard  Pulmonary/Chest: Effort normal and breath sounds normal  No respiratory distress  She has no wheezes  She has no rales  She exhibits no tenderness  Abdominal: Soft  Bowel sounds are normal  She exhibits no distension and no mass  There is no tenderness  There is no rebound and no guarding  Musculoskeletal: Normal range of motion  She exhibits edema ( trace edema to bilateral lower extremities)  She exhibits no tenderness or deformity  Lymphadenopathy:     She has no cervical adenopathy  Neurological: She is alert and oriented to person, place, and time  She has normal reflexes  No cranial nerve deficit  She exhibits normal muscle tone  Coordination normal    Skin: Skin is warm and dry  No rash noted  She is not diaphoretic  No erythema  No pallor  Psychiatric: She has a normal mood and affect   Her behavior is normal

## 2019-01-26 DIAGNOSIS — I10 ESSENTIAL HYPERTENSION: ICD-10-CM

## 2019-01-26 RX ORDER — AMLODIPINE BESYLATE 5 MG/1
TABLET ORAL
Qty: 90 TABLET | Refills: 1 | OUTPATIENT
Start: 2019-01-26

## 2019-04-26 DIAGNOSIS — I10 ESSENTIAL HYPERTENSION: ICD-10-CM

## 2019-04-27 RX ORDER — AMLODIPINE BESYLATE 5 MG/1
TABLET ORAL
Qty: 90 TABLET | Refills: 1 | OUTPATIENT
Start: 2019-04-27

## 2019-04-29 DIAGNOSIS — I10 ESSENTIAL HYPERTENSION: ICD-10-CM

## 2019-04-29 RX ORDER — AMLODIPINE BESYLATE 5 MG/1
5 TABLET ORAL DAILY
Qty: 90 TABLET | Refills: 0 | Status: SHIPPED | OUTPATIENT
Start: 2019-04-29 | End: 2019-09-24 | Stop reason: ALTCHOICE

## 2019-05-07 ENCOUNTER — OFFICE VISIT (OUTPATIENT)
Dept: INTERNAL MEDICINE CLINIC | Age: 74
End: 2019-05-07
Payer: COMMERCIAL

## 2019-05-07 ENCOUNTER — TELEPHONE (OUTPATIENT)
Dept: INTERNAL MEDICINE CLINIC | Age: 74
End: 2019-05-07

## 2019-05-07 VITALS
SYSTOLIC BLOOD PRESSURE: 108 MMHG | BODY MASS INDEX: 28.04 KG/M2 | OXYGEN SATURATION: 97 % | WEIGHT: 140 LBS | DIASTOLIC BLOOD PRESSURE: 62 MMHG | TEMPERATURE: 97.9 F | HEART RATE: 86 BPM

## 2019-05-07 DIAGNOSIS — E55.9 VITAMIN D INSUFFICIENCY: ICD-10-CM

## 2019-05-07 DIAGNOSIS — E83.52 HYPERCALCEMIA: ICD-10-CM

## 2019-05-07 DIAGNOSIS — Z12.39 SCREENING FOR BREAST CANCER: ICD-10-CM

## 2019-05-07 DIAGNOSIS — I10 ESSENTIAL HYPERTENSION: ICD-10-CM

## 2019-05-07 DIAGNOSIS — Z11.59 ENCOUNTER FOR HEPATITIS C SCREENING TEST FOR LOW RISK PATIENT: ICD-10-CM

## 2019-05-07 DIAGNOSIS — Z12.39 ENCOUNTER FOR OTHER SCREENING FOR MALIGNANT NEOPLASM OF BREAST: Primary | ICD-10-CM

## 2019-05-07 DIAGNOSIS — Z13.820 SCREENING FOR OSTEOPOROSIS: ICD-10-CM

## 2019-05-07 DIAGNOSIS — E78.49 OTHER HYPERLIPIDEMIA: ICD-10-CM

## 2019-05-07 PROCEDURE — 3074F SYST BP LT 130 MM HG: CPT | Performed by: NURSE PRACTITIONER

## 2019-05-07 PROCEDURE — 99213 OFFICE O/P EST LOW 20 MIN: CPT | Performed by: NURSE PRACTITIONER

## 2019-05-07 PROCEDURE — 1160F RVW MEDS BY RX/DR IN RCRD: CPT | Performed by: NURSE PRACTITIONER

## 2019-05-07 PROCEDURE — 3078F DIAST BP <80 MM HG: CPT | Performed by: NURSE PRACTITIONER

## 2019-05-07 RX ORDER — AMLODIPINE BESYLATE 5 MG/1
5 TABLET ORAL DAILY
Qty: 90 TABLET | Refills: 1 | Status: CANCELLED | OUTPATIENT
Start: 2019-05-07

## 2019-05-07 RX ORDER — HYDROCHLOROTHIAZIDE 12.5 MG/1
12.5 TABLET ORAL DAILY
Qty: 90 TABLET | Refills: 1 | Status: SHIPPED | OUTPATIENT
Start: 2019-05-07 | End: 2019-09-24 | Stop reason: SDUPTHER

## 2019-05-07 RX ORDER — LOSARTAN POTASSIUM 100 MG/1
100 TABLET ORAL DAILY
Qty: 90 TABLET | Refills: 1 | Status: SHIPPED | OUTPATIENT
Start: 2019-05-07 | End: 2019-09-24 | Stop reason: SDUPTHER

## 2019-05-10 ENCOUNTER — TELEPHONE (OUTPATIENT)
Dept: OTHER | Facility: OTHER | Age: 74
End: 2019-05-10

## 2019-05-10 LAB — HCV AB SER-ACNC: NEGATIVE

## 2019-09-24 ENCOUNTER — OFFICE VISIT (OUTPATIENT)
Dept: INTERNAL MEDICINE CLINIC | Age: 74
End: 2019-09-24
Payer: COMMERCIAL

## 2019-09-24 VITALS
HEIGHT: 60 IN | TEMPERATURE: 98.6 F | OXYGEN SATURATION: 98 % | WEIGHT: 139 LBS | BODY MASS INDEX: 27.29 KG/M2 | SYSTOLIC BLOOD PRESSURE: 110 MMHG | HEART RATE: 93 BPM | DIASTOLIC BLOOD PRESSURE: 80 MMHG

## 2019-09-24 DIAGNOSIS — I10 ESSENTIAL HYPERTENSION: ICD-10-CM

## 2019-09-24 DIAGNOSIS — Z13.820 SCREENING FOR OSTEOPOROSIS: ICD-10-CM

## 2019-09-24 DIAGNOSIS — E78.49 OTHER HYPERLIPIDEMIA: ICD-10-CM

## 2019-09-24 DIAGNOSIS — E55.9 VITAMIN D INSUFFICIENCY: Primary | ICD-10-CM

## 2019-09-24 DIAGNOSIS — J30.2 SEASONAL ALLERGIES: ICD-10-CM

## 2019-09-24 DIAGNOSIS — Z12.39 SCREENING FOR BREAST CANCER: ICD-10-CM

## 2019-09-24 PROCEDURE — 3079F DIAST BP 80-89 MM HG: CPT | Performed by: NURSE PRACTITIONER

## 2019-09-24 PROCEDURE — 3074F SYST BP LT 130 MM HG: CPT | Performed by: NURSE PRACTITIONER

## 2019-09-24 PROCEDURE — 99213 OFFICE O/P EST LOW 20 MIN: CPT | Performed by: NURSE PRACTITIONER

## 2019-09-24 RX ORDER — HYDROCHLOROTHIAZIDE 12.5 MG/1
12.5 TABLET ORAL DAILY
Qty: 90 TABLET | Refills: 1 | Status: SHIPPED | OUTPATIENT
Start: 2019-09-24 | End: 2020-03-19

## 2019-09-24 RX ORDER — LOSARTAN POTASSIUM 100 MG/1
100 TABLET ORAL DAILY
Qty: 90 TABLET | Refills: 1 | Status: SHIPPED | OUTPATIENT
Start: 2019-09-24 | End: 2020-03-19

## 2019-09-24 NOTE — ASSESSMENT & PLAN NOTE
Patient's blood pressure 110/80, patient will continue with losartan 100 mg, and hydrochlorothiazide 12 5 mg tablet daily  Continue current regimen -   Continue to monitor blood pressure at home  Goal BP is < 130/80  Contact our office for consistent elevations  Recommend low sodium diet  Exercise 30 minutes three times a week as tolerated    Recommend yearly eye exam

## 2019-09-24 NOTE — ASSESSMENT & PLAN NOTE
Patient will continue with Zyrtec, will advise patient to start Flonase 2 sprays each nostril daily

## 2019-09-24 NOTE — ASSESSMENT & PLAN NOTE
Patient will continues take fish oil 2000 mg daily and red yeast rice, will get a repeat lipid panel at patient's follow-up appointment in approximately 3 months  Recommend healthy lifestyle choices for your cholesterol  Low fat/low cholesterol diet  Limit/avoid red meat  Eat more lean meat - chicken breast, ground turkey, fish  Exercise 30 mins at least 3 times a week as tolerated

## 2019-09-24 NOTE — ASSESSMENT & PLAN NOTE
Patient due for DEXA scan  A daily intake of calcium of at least 1200 mg and vitamin D, 800-1000 IU, as well as weight bearing and muscle strengthening exercise, fall prevention and avoidance of tobacco and excessive alcohol intake as basic preventive measures are recommended

## 2019-09-24 NOTE — ASSESSMENT & PLAN NOTE
Patient's vitamin-D level within normal limits, patient is to continue with vitamin-D supplementation

## 2019-09-24 NOTE — PROGRESS NOTES
Assessment/Plan:    Essential hypertension  Patient's blood pressure 110/80, patient will continue with losartan 100 mg, and hydrochlorothiazide 12 5 mg tablet daily  Continue current regimen -   Continue to monitor blood pressure at home  Goal BP is < 130/80  Contact our office for consistent elevations  Recommend low sodium diet  Exercise 30 minutes three times a week as tolerated  Recommend yearly eye exam       Vitamin D insufficiency  Patient's vitamin-D level within normal limits, patient is to continue with vitamin-D supplementation  Screening for osteoporosis  Patient due for DEXA scan  A daily intake of calcium of at least 1200 mg and vitamin D, 800-1000 IU, as well as weight bearing and muscle strengthening exercise, fall prevention and avoidance of tobacco and excessive alcohol intake as basic preventive measures are recommended  Screening for breast cancer  Advised patient that she is due for mammogram     Other hyperlipidemia   Patient will continues take fish oil 2000 mg daily and red yeast rice, will get a repeat lipid panel at patient's follow-up appointment in approximately 3 months  Recommend healthy lifestyle choices for your cholesterol  Low fat/low cholesterol diet  Limit/avoid red meat  Eat more lean meat - chicken breast, ground turkey, fish  Exercise 30 mins at least 3 times a week as tolerated  Seasonal allergies  Patient will continue with Zyrtec, will advise patient to start Flonase 2 sprays each nostril daily  Diagnoses and all orders for this visit:    Vitamin D insufficiency    Essential hypertension  -     hydrochlorothiazide (HYDRODIURIL) 12 5 mg tablet; Take 1 tablet (12 5 mg total) by mouth daily  -     losartan (COZAAR) 100 MG tablet; Take 1 tablet (100 mg total) by mouth daily    Screening for osteoporosis    Screening for breast cancer    Other hyperlipidemia    Seasonal allergies    Other orders  -     Omega-3 Fatty Acids (FISH OIL PO);  Take by mouth  -     Red Yeast Rice Extract (RED YEAST RICE PO); Take by mouth  -     TURMERIC PO; Take by mouth          Subjective:      Patient ID: Pascale Schwab is a 69 y o  female  Patient presents today to follow-up on hypertension, patient is currently taking losartan 100 mg tablet daily,  and   Hydrochlorothiazide 12 5 mg    Reviewed blood work with patient while in the office today  Colonoscopy- family history of colon cancer, 2018, gets one every 2-3 years   Gyn-does not want anymore pap smears, denies abnormal paps in the past, denies abnormal bleeding   Mammogram-will give script    The patient also reports that she had a head cold which started approximately 7 days ago, she believes that her seasonal allergies acting up, she had started Zyrtec as well as vitamin-D with some relief  Patient reports congestion runny nose and sneezing a lot  Patient denies coughing or ear pressure, patient denies fevers or chills  Hypertension   This is a chronic problem  The current episode started more than 1 year ago  The problem is unchanged  The problem is controlled  Pertinent negatives include no anxiety, blurred vision, chest pain, headaches, malaise/fatigue, neck pain, orthopnea, palpitations, peripheral edema, PND, shortness of breath or sweats  Risk factors for coronary artery disease include sedentary lifestyle and dyslipidemia  Treatments tried:   Patient currently taking amlodipine as well as losartan  And hydrochlorothiazide  The current treatment provides mild improvement  Compliance problems include diet and exercise  The following portions of the patient's history were reviewed and updated as appropriate: allergies, current medications, past family history, past medical history, past social history, past surgical history and problem list     Review of Systems   Constitutional: Negative for activity change, appetite change, chills, diaphoresis, fever and malaise/fatigue     HENT: Positive for congestion, rhinorrhea and sinus pressure  Negative for ear discharge, ear pain, postnasal drip, sinus pain and sore throat  Eyes: Negative for blurred vision, pain, discharge, itching and visual disturbance  Respiratory: Negative for cough, chest tightness, shortness of breath and wheezing  Cardiovascular: Negative for chest pain, palpitations, orthopnea, leg swelling and PND  Gastrointestinal: Negative for abdominal pain, constipation, diarrhea, nausea and vomiting  Endocrine: Negative for polydipsia, polyphagia and polyuria  Genitourinary: Negative for difficulty urinating, dysuria and urgency  Musculoskeletal: Negative for arthralgias, back pain and neck pain  Skin: Negative for rash and wound  Neurological: Negative for dizziness, weakness, numbness and headaches  Past Medical History:   Diagnosis Date    Arthritis     Family history of colon cancer in mother     Functional murmur     Hypertension     Dx'd in , controlled     Osteoporosis          Current Outpatient Medications:     Ascorbic Acid (VITAMIN C PO), Take 1 tablet by mouth daily, Disp: , Rfl:     hydrochlorothiazide (HYDRODIURIL) 12 5 mg tablet, Take 1 tablet (12 5 mg total) by mouth daily, Disp: 90 tablet, Rfl: 1    losartan (COZAAR) 100 MG tablet, Take 1 tablet (100 mg total) by mouth daily, Disp: 90 tablet, Rfl: 1    Omega-3 Fatty Acids (FISH OIL PO), Take by mouth, Disp: , Rfl:     Red Yeast Rice Extract (RED YEAST RICE PO), Take by mouth, Disp: , Rfl:     TURMERIC PO, Take by mouth, Disp: , Rfl:     No Known Allergies    Social History   Past Surgical History:   Procedure Laterality Date     SECTION      x2, 1972 &     FLEXIBLE SIGMOIDOSCOPY      [place] COLONOSCOPY FLX DX W/COLLJ SPEC WHEN PFRMD N/A 10/10/2016    Procedure: COLONOSCOPY;  Surgeon: Katarzyna Burrell MD;  Location: [facility];   Service: Gastroenterology    TOE SURGERY Bilateral     hammer toe correction surgery    TONSILLECTOMY      with Adenoidectomy     Family History   Problem Relation Age of Onset    Colon cancer Mother 68    Colon cancer Family        Objective:  /80 (BP Location: Left arm, Patient Position: Sitting, Cuff Size: Adult)   Pulse 93   Temp 98 6 °F (37 °C) (Tympanic)   Ht 4' 11 84" (1 52 m)   Wt 63 kg (139 lb)   SpO2 98%   BMI 27 29 kg/m²     No results found for this or any previous visit (from the past 1344 hour(s))  Physical Exam   Constitutional: She is oriented to person, place, and time  She appears well-developed and well-nourished  No distress  HENT:   Head: Normocephalic and atraumatic  Right Ear: External ear normal    Left Ear: External ear normal    Nose: Nose normal    Mouth/Throat: Oropharynx is clear and moist  No oropharyngeal exudate  Eyes: Pupils are equal, round, and reactive to light  Conjunctivae and EOM are normal  Right eye exhibits no discharge  Left eye exhibits no discharge  No scleral icterus  Neck: Normal range of motion  Neck supple  No JVD present  No tracheal deviation present  No thyromegaly present  Cardiovascular: Normal rate, regular rhythm and intact distal pulses  Exam reveals no gallop and no friction rub  Murmur (2/6 systolic murmur maximal in aortic valve region) heard  Pulmonary/Chest: Effort normal and breath sounds normal  No respiratory distress  She has no wheezes  She has no rales  She exhibits no tenderness  Abdominal: Soft  Bowel sounds are normal  She exhibits no distension and no mass  There is no tenderness  There is no rebound and no guarding  Musculoskeletal: Normal range of motion  She exhibits edema ( trace edema to bilateral lower extremities)  She exhibits no tenderness or deformity  Lymphadenopathy:     She has no cervical adenopathy  Neurological: She is alert and oriented to person, place, and time  She has normal reflexes  No cranial nerve deficit  She exhibits normal muscle tone   Coordination normal  Skin: Skin is warm and dry  No rash noted  She is not diaphoretic  No erythema  No pallor  Psychiatric: She has a normal mood and affect   Her behavior is normal

## 2019-10-16 ENCOUNTER — OFFICE VISIT (OUTPATIENT)
Dept: INTERNAL MEDICINE CLINIC | Facility: CLINIC | Age: 74
End: 2019-10-16
Payer: COMMERCIAL

## 2019-10-16 VITALS
HEIGHT: 60 IN | DIASTOLIC BLOOD PRESSURE: 82 MMHG | SYSTOLIC BLOOD PRESSURE: 128 MMHG | OXYGEN SATURATION: 95 % | HEART RATE: 84 BPM | BODY MASS INDEX: 27.41 KG/M2 | WEIGHT: 139.6 LBS | TEMPERATURE: 98.4 F

## 2019-10-16 DIAGNOSIS — Z12.11 SCREENING FOR COLON CANCER: Primary | ICD-10-CM

## 2019-10-16 DIAGNOSIS — L03.211 CELLULITIS OF FACE: ICD-10-CM

## 2019-10-16 PROCEDURE — 99213 OFFICE O/P EST LOW 20 MIN: CPT | Performed by: INTERNAL MEDICINE

## 2019-10-16 PROCEDURE — 3008F BODY MASS INDEX DOCD: CPT | Performed by: INTERNAL MEDICINE

## 2019-10-16 RX ORDER — CEPHALEXIN 500 MG/1
500 CAPSULE ORAL EVERY 8 HOURS SCHEDULED
Qty: 21 CAPSULE | Refills: 0 | Status: SHIPPED | OUTPATIENT
Start: 2019-10-16 | End: 2019-10-23

## 2019-10-16 NOTE — ASSESSMENT & PLAN NOTE
Will treat with Keflex 500 mg q 8 hours for 7 days  Advised she discontinue Flonase as this likely caused the ulcerations in her nares  She is to contact office for worsening symptoms

## 2019-10-16 NOTE — PROGRESS NOTES
Assessment/Plan:    Cellulitis of face  Will treat with Keflex 500 mg q 8 hours for 7 days  Advised she discontinue Flonase as this likely caused the ulcerations in her nares  She is to contact office for worsening symptoms  Diagnoses and all orders for this visit:    Screening for colon cancer    Cellulitis of face  -     cephalexin (KEFLEX) 500 mg capsule; Take 1 capsule (500 mg total) by mouth every 8 (eight) hours for 7 days    Other orders  -     Cancel: Ambulatory referral to Gastroenterology; Future                Subjective:      Patient ID: Brooke Sargent is a 69 y o  female  69year old female is seen today after she had exposure to a bug spray 6 weeks ago  2 weeks ago she started to develop ulcers in bilateral nares, after she was started on Flonase and zyrtec for seasonal allergies  She did have some mild oozing of blood from one of the ulcers  She also reports having purulent drainage from bilateral nares  The following portions of the patient's history were reviewed and updated as appropriate: allergies, current medications, past family history, past medical history, past social history, past surgical history and problem list     Review of Systems   Constitutional: Negative for activity change, appetite change, chills, diaphoresis, fatigue and fever  HENT: Negative for congestion, postnasal drip, rhinorrhea, sinus pressure, sinus pain, sneezing and sore throat  Eyes: Negative for visual disturbance  Respiratory: Negative for apnea, cough, choking, chest tightness, shortness of breath and wheezing  Cardiovascular: Negative for chest pain, palpitations and leg swelling  Gastrointestinal: Negative for abdominal distention, abdominal pain, anal bleeding, blood in stool, constipation, diarrhea, nausea and vomiting  Endocrine: Negative for cold intolerance and heat intolerance  Genitourinary: Negative for difficulty urinating, dysuria and hematuria     Musculoskeletal: Negative  Skin: Negative  Neurological: Negative for dizziness, weakness, light-headedness, numbness and headaches  Hematological: Negative for adenopathy  Psychiatric/Behavioral: Negative for agitation, sleep disturbance and suicidal ideas  All other systems reviewed and are negative  Past Medical History:   Diagnosis Date    Arthritis     Family history of colon cancer in mother     Functional murmur     Hypertension     Dx'd in , controlled     Osteoporosis          Current Outpatient Medications:     Ascorbic Acid (VITAMIN C PO), Take 1 tablet by mouth daily, Disp: , Rfl:     hydrochlorothiazide (HYDRODIURIL) 12 5 mg tablet, Take 1 tablet (12 5 mg total) by mouth daily, Disp: 90 tablet, Rfl: 1    losartan (COZAAR) 100 MG tablet, Take 1 tablet (100 mg total) by mouth daily, Disp: 90 tablet, Rfl: 1    Omega-3 Fatty Acids (FISH OIL PO), Take 1 capsule by mouth daily , Disp: , Rfl:     Red Yeast Rice Extract (RED YEAST RICE PO), Take 1 tablet by mouth daily , Disp: , Rfl:     TURMERIC PO, Take 1 tablet by mouth daily , Disp: , Rfl:     cephalexin (KEFLEX) 500 mg capsule, Take 1 capsule (500 mg total) by mouth every 8 (eight) hours for 7 days, Disp: 21 capsule, Rfl: 0    Allergies   Allergen Reactions    Flonase [Fluticasone] Dermatitis       Social History   Past Surgical History:   Procedure Laterality Date     SECTION      x2,  &     FLEXIBLE SIGMOIDOSCOPY      [place] COLONOSCOPY FLX DX W/COLLJ SPEC WHEN PFRMD N/A 10/10/2016    Procedure: COLONOSCOPY;  Surgeon: Giovani Beckett MD;  Location: [facility];   Service: Gastroenterology    TOE SURGERY Bilateral     hammer toe correction surgery    TONSILLECTOMY      with Adenoidectomy     Family History   Problem Relation Age of Onset    Colon cancer Mother 68    Colon cancer Family        Objective:  /82 (BP Location: Left arm, Patient Position: Sitting, Cuff Size: Standard)   Pulse 84   Temp 98 4 °F (36 9 °C) (Tympanic)   Ht 4' 11 84" (1 52 m)   Wt 63 3 kg (139 lb 9 6 oz)   SpO2 95%   BMI 27 41 kg/m²     No results found for this or any previous visit (from the past 1344 hour(s))  Physical Exam   Constitutional: She is oriented to person, place, and time  She appears well-developed and well-nourished  No distress  HENT:   Head: Normocephalic and atraumatic  Purulent drainage from bilateral nares, right greater than the left  Significant erythema of bilateral nares with ulcerations  no active bleeding  Eyes: Pupils are equal, round, and reactive to light  Conjunctivae and EOM are normal  Right eye exhibits no discharge  Left eye exhibits no discharge  Neck: Normal range of motion  Neck supple  No JVD present  No thyromegaly present  Cardiovascular: Normal rate, regular rhythm, normal heart sounds and intact distal pulses  Exam reveals no gallop and no friction rub  No murmur heard  Pulmonary/Chest: Effort normal and breath sounds normal  No respiratory distress  She has no wheezes  She has no rales  She exhibits no tenderness  Abdominal: Soft  She exhibits no distension  There is no tenderness  Musculoskeletal: Normal range of motion  She exhibits no edema, tenderness or deformity  Lymphadenopathy:     She has no cervical adenopathy  Neurological: She is alert and oriented to person, place, and time  No cranial nerve deficit  Coordination normal    Skin: Skin is warm and dry  No rash noted  She is not diaphoretic  No erythema  No pallor  Psychiatric: She has a normal mood and affect  Her behavior is normal  Judgment and thought content normal    Nursing note and vitals reviewed

## 2020-01-09 ENCOUNTER — HOSPITAL ENCOUNTER (OUTPATIENT)
Dept: RADIOLOGY | Age: 75
Discharge: HOME | End: 2020-01-09
Payer: COMMERCIAL

## 2020-01-09 VITALS — BODY MASS INDEX: 27.29 KG/M2 | WEIGHT: 139 LBS | HEIGHT: 60 IN

## 2020-01-09 DIAGNOSIS — Z13.820 SCREENING FOR OSTEOPOROSIS: ICD-10-CM

## 2020-01-09 DIAGNOSIS — Z12.39 SCREENING FOR BREAST CANCER: ICD-10-CM

## 2020-01-09 PROCEDURE — 77067 SCR MAMMO BI INCL CAD: CPT

## 2020-01-09 PROCEDURE — 77080 DXA BONE DENSITY AXIAL: CPT

## 2020-01-17 ENCOUNTER — TELEPHONE (OUTPATIENT)
Dept: INTERNAL MEDICINE CLINIC | Age: 75
End: 2020-01-17

## 2020-01-17 NOTE — TELEPHONE ENCOUNTER
Patient was in the office for an appt for her son yesterday and was inquiring if we received the results of the Dexa Scan and Mammogram that she had completed on 1/9/2020  Will forward to provider to review results

## 2020-01-21 ENCOUNTER — OFFICE VISIT (OUTPATIENT)
Dept: INTERNAL MEDICINE CLINIC | Age: 75
End: 2020-01-21
Payer: COMMERCIAL

## 2020-01-21 VITALS
HEART RATE: 82 BPM | DIASTOLIC BLOOD PRESSURE: 78 MMHG | BODY MASS INDEX: 27.56 KG/M2 | WEIGHT: 140.4 LBS | OXYGEN SATURATION: 98 % | TEMPERATURE: 97.8 F | HEIGHT: 60 IN | SYSTOLIC BLOOD PRESSURE: 120 MMHG

## 2020-01-21 DIAGNOSIS — I10 ESSENTIAL HYPERTENSION: ICD-10-CM

## 2020-01-21 DIAGNOSIS — Z86.010 HISTORY OF COLON POLYPS: Primary | ICD-10-CM

## 2020-01-21 DIAGNOSIS — Z23 NEED FOR VACCINATION WITH 13-POLYVALENT PNEUMOCOCCAL CONJUGATE VACCINE: ICD-10-CM

## 2020-01-21 DIAGNOSIS — E55.9 VITAMIN D INSUFFICIENCY: ICD-10-CM

## 2020-01-21 DIAGNOSIS — M81.8 OTHER OSTEOPOROSIS WITHOUT CURRENT PATHOLOGICAL FRACTURE: ICD-10-CM

## 2020-01-21 DIAGNOSIS — E78.49 OTHER HYPERLIPIDEMIA: ICD-10-CM

## 2020-01-21 PROCEDURE — 90670 PCV13 VACCINE IM: CPT

## 2020-01-21 PROCEDURE — 3288F FALL RISK ASSESSMENT DOCD: CPT | Performed by: NURSE PRACTITIONER

## 2020-01-21 PROCEDURE — 90471 IMMUNIZATION ADMIN: CPT

## 2020-01-21 PROCEDURE — 3074F SYST BP LT 130 MM HG: CPT | Performed by: NURSE PRACTITIONER

## 2020-01-21 PROCEDURE — 3008F BODY MASS INDEX DOCD: CPT | Performed by: NURSE PRACTITIONER

## 2020-01-21 PROCEDURE — 1101F PT FALLS ASSESS-DOCD LE1/YR: CPT | Performed by: NURSE PRACTITIONER

## 2020-01-21 PROCEDURE — 1160F RVW MEDS BY RX/DR IN RCRD: CPT | Performed by: NURSE PRACTITIONER

## 2020-01-21 PROCEDURE — 99213 OFFICE O/P EST LOW 20 MIN: CPT | Performed by: NURSE PRACTITIONER

## 2020-01-21 PROCEDURE — 3078F DIAST BP <80 MM HG: CPT | Performed by: NURSE PRACTITIONER

## 2020-01-21 NOTE — PROGRESS NOTES
Assessment/Plan:    Essential hypertension  Currently well controlled, patient will continue with losartan 100 mg, and hydrochlorothiazide 12 5 mg tablet daily  Continue current regimen -   Continue to monitor blood pressure at home  Goal BP is < 130/80  Contact our office for consistent elevations  Recommend low sodium diet  Exercise 30 minutes three times a week as tolerated  Recommend yearly eye exam       Vitamin D insufficiency  Patient's vitamin-D level within normal limits, patient is to continue with vitamin-D supplementation  Other osteoporosis without current pathological fracture  Will check with Ela to see if patient qualifies for Prolia  A daily intake of calcium of at least 1200 mg and vitamin D, 800-1000 IU, as well as weight bearing and muscle strengthening exercise, fall prevention and avoidance of tobacco and excessive alcohol intake as basic preventive measures are recommended  Other hyperlipidemia   Patient will continues take fish oil 2000 mg daily and red yeast rice  Recommend healthy lifestyle choices for your cholesterol  Low fat/low cholesterol diet  Limit/avoid red meat  Eat more lean meat - chicken breast, ground turkey, fish  Exercise 30 mins at least 3 times a week as tolerated  BMI Counseling: There is no height or weight on file to calculate BMI  The BMI is above normal  Nutrition recommendations include decreasing portion sizes, encouraging healthy choices of fruits and vegetables, decreasing fast food intake, consuming healthier snacks, limiting drinks that contain sugar, moderation in carbohydrate intake, increasing intake of lean protein, reducing intake of saturated and trans fat and reducing intake of cholesterol  Exercise recommendations include moderate physical activity 150 minutes/week and exercising 3-5 times per week  Falls Plan of Care: balance, strength, and gait training instructions were provided   Patient assessed for orthostatic hypotension  Vitamin D supplementation was recommended  Diagnoses and all orders for this visit:    History of colon polyps    Need for vaccination with 13-polyvalent pneumococcal conjugate vaccine  -     PNEUMOCOCCAL CONJUGATE VACCINE 13-VALENT GREATER THAN 6 MONTHS    Essential hypertension    Vitamin D insufficiency    Other osteoporosis without current pathological fracture    Other hyperlipidemia    Other orders  -     Cancel: Ambulatory referral to Gastroenterology; Future          Subjective:      Patient ID: Nimesh Domingo is a 69 y o  female  Patient presents today to follow-up on hypertension, patient is currently taking losartan 100 mg tablet daily,  and  Hydrochlorothiazide 12 5 mg    Reviewed blood work with patient while in the office today  Patient has no new concerns  Colonoscopy- family history of colon cancer, 2018  Gyn-does not want anymore pap smears, denies abnormal paps in the past, denies abnormal bleeding   Mammogram-will give script      Hypertension   This is a chronic problem  The current episode started more than 1 year ago  The problem is unchanged  The problem is controlled  Pertinent negatives include no anxiety, blurred vision, chest pain, headaches, malaise/fatigue, neck pain, orthopnea, palpitations, peripheral edema, PND, shortness of breath or sweats  Risk factors for coronary artery disease include sedentary lifestyle and dyslipidemia  Treatments tried:   Patient currently taking amlodipine as well as losartan  And hydrochlorothiazide  The current treatment provides mild improvement  Compliance problems include diet and exercise          The following portions of the patient's history were reviewed and updated as appropriate: allergies, current medications, past family history, past medical history, past social history, past surgical history and problem list     Review of Systems   Constitutional: Negative for activity change, appetite change, chills, diaphoresis, fatigue, fever and malaise/fatigue  HENT: Negative for congestion, postnasal drip, rhinorrhea, sinus pressure, sinus pain, sneezing and sore throat  Eyes: Negative for blurred vision and visual disturbance  Respiratory: Negative for apnea, cough, choking, chest tightness, shortness of breath and wheezing  Cardiovascular: Negative for chest pain, palpitations, orthopnea, leg swelling and PND  Gastrointestinal: Negative for abdominal distention, abdominal pain, anal bleeding, blood in stool, constipation, diarrhea, nausea and vomiting  Endocrine: Negative for cold intolerance and heat intolerance  Genitourinary: Negative for difficulty urinating, dysuria and hematuria  Musculoskeletal: Negative  Negative for neck pain  Skin: Negative  Neurological: Negative for dizziness, weakness, light-headedness, numbness and headaches  Hematological: Negative for adenopathy  Psychiatric/Behavioral: Negative for agitation, sleep disturbance and suicidal ideas  All other systems reviewed and are negative          Past Medical History:   Diagnosis Date    Arthritis     Family history of colon cancer in mother     Functional murmur     Hypertension     Dx'd in 2003, controlled     Osteoporosis          Current Outpatient Medications:     Ascorbic Acid (VITAMIN C PO), Take 2 tablets by mouth daily , Disp: , Rfl:     hydrochlorothiazide (HYDRODIURIL) 12 5 mg tablet, Take 1 tablet (12 5 mg total) by mouth daily, Disp: 90 tablet, Rfl: 1    losartan (COZAAR) 100 MG tablet, Take 1 tablet (100 mg total) by mouth daily, Disp: 90 tablet, Rfl: 1    Omega-3 Fatty Acids (FISH OIL PO), Take 1 capsule by mouth daily , Disp: , Rfl:     Red Yeast Rice Extract (RED YEAST RICE PO), Take 1 tablet by mouth daily , Disp: , Rfl:     TURMERIC PO, Take 1 tablet by mouth daily , Disp: , Rfl:     Allergies   Allergen Reactions    Flonase [Fluticasone] Dermatitis       Social History   Past Surgical History: Procedure Laterality Date     SECTION      x2,  &     FLEXIBLE SIGMOIDOSCOPY      [place] COLONOSCOPY FLX DX W/COLLJ SPEC WHEN PFRMD N/A 10/10/2016    Procedure: COLONOSCOPY;  Surgeon: Shala Hutchins MD;  Location: [facility]; Service: Gastroenterology    TOE SURGERY Bilateral     hammer toe correction surgery    TONSILLECTOMY      with Adenoidectomy     Family History   Problem Relation Age of Onset    Colon cancer Mother 68    No Known Problems Father     No Known Problems Sister     No Known Problems Daughter     No Known Problems Maternal Grandmother     No Known Problems Maternal Grandfather     No Known Problems Paternal Grandmother     No Known Problems Paternal Grandfather     No Known Problems Son     No Known Problems Son     No Known Problems Paternal Aunt     No Known Problems Paternal Aunt     No Known Problems Paternal Aunt     No Known Problems Paternal Aunt        Objective:  /78 (BP Location: Left arm, Patient Position: Sitting, Cuff Size: Standard)   Pulse 82   Temp 97 8 °F (36 6 °C) (Tympanic)   Ht 4' 11 84" (1 52 m)   Wt 63 7 kg (140 lb 6 4 oz)   SpO2 98%   BMI 27 57 kg/m²     No results found for this or any previous visit (from the past 1344 hour(s))  Physical Exam   Constitutional: She is oriented to person, place, and time  She appears well-developed and well-nourished  No distress  HENT:   Head: Normocephalic and atraumatic  Right Ear: External ear normal    Left Ear: External ear normal    Nose: Nose normal    Mouth/Throat: Oropharynx is clear and moist  No oropharyngeal exudate  Eyes: Pupils are equal, round, and reactive to light  Conjunctivae and EOM are normal  Right eye exhibits no discharge  Left eye exhibits no discharge  No scleral icterus  Neck: Normal range of motion  Neck supple  No JVD present  No tracheal deviation present  No thyromegaly present  Cardiovascular: Normal rate, regular rhythm and intact distal pulses  Exam reveals no gallop and no friction rub  Murmur (2/6 systolic murmur maximal in aortic valve region) heard  Pulmonary/Chest: Effort normal and breath sounds normal  No respiratory distress  She has no wheezes  She has no rales  She exhibits no tenderness  Abdominal: Soft  Bowel sounds are normal  She exhibits no distension and no mass  There is no tenderness  There is no rebound and no guarding  Musculoskeletal: Normal range of motion  She exhibits edema ( trace edema to bilateral lower extremities)  She exhibits no tenderness or deformity  Lymphadenopathy:     She has no cervical adenopathy  Neurological: She is alert and oriented to person, place, and time  She has normal reflexes  No cranial nerve deficit  She exhibits normal muscle tone  Coordination normal    Skin: Skin is warm and dry  No rash noted  She is not diaphoretic  No erythema  No pallor  Psychiatric: She has a normal mood and affect   Her behavior is normal

## 2020-01-22 ENCOUNTER — TELEPHONE (OUTPATIENT)
Dept: INTERNAL MEDICINE CLINIC | Facility: CLINIC | Age: 75
End: 2020-01-22

## 2020-01-22 PROBLEM — M81.8 OTHER OSTEOPOROSIS WITHOUT CURRENT PATHOLOGICAL FRACTURE: Status: ACTIVE | Noted: 2018-12-05

## 2020-01-22 NOTE — ASSESSMENT & PLAN NOTE
Will check with Ela to see if patient qualifies for Prolia  A daily intake of calcium of at least 1200 mg and vitamin D, 800-1000 IU, as well as weight bearing and muscle strengthening exercise, fall prevention and avoidance of tobacco and excessive alcohol intake as basic preventive measures are recommended

## 2020-01-22 NOTE — ASSESSMENT & PLAN NOTE
Patient will continues take fish oil 2000 mg daily and red yeast rice  Recommend healthy lifestyle choices for your cholesterol  Low fat/low cholesterol diet  Limit/avoid red meat  Eat more lean meat - chicken breast, ground turkey, fish  Exercise 30 mins at least 3 times a week as tolerated

## 2020-01-22 NOTE — ASSESSMENT & PLAN NOTE
Currently well controlled, patient will continue with losartan 100 mg, and hydrochlorothiazide 12 5 mg tablet daily  Continue current regimen -   Continue to monitor blood pressure at home  Goal BP is < 130/80  Contact our office for consistent elevations  Recommend low sodium diet  Exercise 30 minutes three times a week as tolerated    Recommend yearly eye exam

## 2020-01-22 NOTE — TELEPHONE ENCOUNTER
Working on it with [employer] on what is going to be covered and will do the authorization for this patient as well

## 2020-02-24 NOTE — TELEPHONE ENCOUNTER
Patient's insurance states that a prior authorization is required for the Prolia  Patient is responsible for a copay of 40 00 which will cover everything, visit, Prolia and administration  I faxed to [facility] to get the authorization for this patient  Not sure if it will be approved since she hasn't tried any oral medication for this but we will see what happens  Patient did have an appointment scheduled for 02/24/2020 but called the patient to let her know that we had to cancel it since it wasn't approved yet

## 2020-02-26 ENCOUNTER — TELEPHONE (OUTPATIENT)
Dept: INTERNAL MEDICINE CLINIC | Age: 75
End: 2020-02-26

## 2020-02-26 DIAGNOSIS — M81.8 OTHER OSTEOPOROSIS WITHOUT CURRENT PATHOLOGICAL FRACTURE: Primary | ICD-10-CM

## 2020-02-26 NOTE — TELEPHONE ENCOUNTER
[address] [place] [address]   is denying the Prolia for this patient  She has to try two bisphosphonate's and fail them before they can approve the prolia  Patient has to try and fail Fosamax, Actonel, Boniva, Reclast, Evista    Patient doesn't have an upcoming appointment with you  Should she come in for you to discuss this with her

## 2020-03-04 RX ORDER — ALENDRONATE SODIUM 70 MG/1
70 TABLET ORAL
Qty: 12 TABLET | Refills: 3 | Status: SHIPPED | OUTPATIENT
Start: 2020-03-04 | End: 2020-07-30 | Stop reason: SDUPTHER

## 2020-03-04 NOTE — TELEPHONE ENCOUNTER
Would like to start patient on Fosamax 70mg PO once weekly, LMOM to discuss with patient, advised her to give me a call back  Ela does she qualify for the assistance program that prolia offers?

## 2020-03-19 ENCOUNTER — TELEPHONE (OUTPATIENT)
Dept: INTERNAL MEDICINE CLINIC | Facility: CLINIC | Age: 75
End: 2020-03-19

## 2020-03-19 DIAGNOSIS — I10 ESSENTIAL HYPERTENSION: Primary | ICD-10-CM

## 2020-03-19 RX ORDER — LOSARTAN POTASSIUM AND HYDROCHLOROTHIAZIDE 12.5; 1 MG/1; MG/1
1 TABLET ORAL DAILY
Qty: 30 TABLET | Refills: 5 | Status: SHIPPED | OUTPATIENT
Start: 2020-03-19 | End: 2020-07-30 | Stop reason: SDUPTHER

## 2020-05-08 ENCOUNTER — TELEPHONE (OUTPATIENT)
Dept: INTERNAL MEDICINE CLINIC | Age: 75
End: 2020-05-08

## 2020-05-11 ENCOUNTER — TELEPHONE (OUTPATIENT)
Dept: INTERNAL MEDICINE CLINIC | Facility: CLINIC | Age: 75
End: 2020-05-11

## 2020-05-18 ENCOUNTER — TELEPHONE (OUTPATIENT)
Dept: INTERNAL MEDICINE CLINIC | Facility: CLINIC | Age: 75
End: 2020-05-18

## 2020-07-30 ENCOUNTER — OFFICE VISIT (OUTPATIENT)
Dept: INTERNAL MEDICINE CLINIC | Age: 75
End: 2020-07-30
Payer: COMMERCIAL

## 2020-07-30 VITALS
BODY MASS INDEX: 26.86 KG/M2 | OXYGEN SATURATION: 98 % | HEIGHT: 60 IN | DIASTOLIC BLOOD PRESSURE: 72 MMHG | WEIGHT: 136.8 LBS | SYSTOLIC BLOOD PRESSURE: 112 MMHG | TEMPERATURE: 98.4 F | HEART RATE: 86 BPM

## 2020-07-30 DIAGNOSIS — R01.1 MURMUR: ICD-10-CM

## 2020-07-30 DIAGNOSIS — I10 ESSENTIAL HYPERTENSION: ICD-10-CM

## 2020-07-30 DIAGNOSIS — E78.49 OTHER HYPERLIPIDEMIA: ICD-10-CM

## 2020-07-30 DIAGNOSIS — E55.9 VITAMIN D INSUFFICIENCY: ICD-10-CM

## 2020-07-30 DIAGNOSIS — R06.02 SOB (SHORTNESS OF BREATH): Primary | ICD-10-CM

## 2020-07-30 DIAGNOSIS — M81.8 OTHER OSTEOPOROSIS WITHOUT CURRENT PATHOLOGICAL FRACTURE: ICD-10-CM

## 2020-07-30 PROCEDURE — 1160F RVW MEDS BY RX/DR IN RCRD: CPT | Performed by: NURSE PRACTITIONER

## 2020-07-30 PROCEDURE — 3074F SYST BP LT 130 MM HG: CPT | Performed by: NURSE PRACTITIONER

## 2020-07-30 PROCEDURE — 4040F PNEUMOC VAC/ADMIN/RCVD: CPT | Performed by: NURSE PRACTITIONER

## 2020-07-30 PROCEDURE — 3008F BODY MASS INDEX DOCD: CPT | Performed by: NURSE PRACTITIONER

## 2020-07-30 PROCEDURE — 1036F TOBACCO NON-USER: CPT | Performed by: NURSE PRACTITIONER

## 2020-07-30 PROCEDURE — 3078F DIAST BP <80 MM HG: CPT | Performed by: NURSE PRACTITIONER

## 2020-07-30 PROCEDURE — 99214 OFFICE O/P EST MOD 30 MIN: CPT | Performed by: NURSE PRACTITIONER

## 2020-07-30 RX ORDER — LOSARTAN POTASSIUM 50 MG/1
50 TABLET ORAL DAILY
Qty: 90 TABLET | Refills: 1 | Status: SHIPPED | OUTPATIENT
Start: 2020-07-30 | End: 2020-12-15

## 2020-07-30 RX ORDER — ALENDRONATE SODIUM 70 MG/1
70 TABLET ORAL
Qty: 12 TABLET | Refills: 3 | Status: SHIPPED | OUTPATIENT
Start: 2020-07-30

## 2020-07-30 RX ORDER — LOSARTAN POTASSIUM AND HYDROCHLOROTHIAZIDE 12.5; 1 MG/1; MG/1
1 TABLET ORAL DAILY
Qty: 90 TABLET | Refills: 1 | Status: SHIPPED | OUTPATIENT
Start: 2020-07-30 | End: 2020-12-15

## 2020-07-30 RX ORDER — HYDROCHLOROTHIAZIDE 25 MG/1
25 TABLET ORAL DAILY
Qty: 90 TABLET | Refills: 1 | Status: SHIPPED | OUTPATIENT
Start: 2020-07-30 | End: 2020-12-15

## 2020-07-30 NOTE — PROGRESS NOTES
Assessment/Plan:    Essential hypertension    Will reduce losartan to 50 mg tablet daily, and increase hydrochlorothiazide 25 mg tablet daily  Continue to monitor blood pressure at home  Goal BP is < 130/80  Contact our office for consistent elevations  Recommend low sodium diet  Exercise 30 minutes three times a week as tolerated  Recommend yearly eye exam       Other osteoporosis without current pathological fracture  Will check with Ela to see if patient qualifies for Prolia  A daily intake of calcium of at least 1200 mg and vitamin D, 800-1000 IU, as well as weight bearing and muscle strengthening exercise, fall prevention and avoidance of tobacco and excessive alcohol intake as basic preventive measures are recommended  Patient continues on Fosamax  Vitamin D insufficiency  Patient's vitamin-D level within normal limits, patient is to continue with vitamin-D supplementation  Other hyperlipidemia   Patient will continues take fish oil 2000 mg daily and red yeast rice  Recommend healthy lifestyle choices for your cholesterol  Low fat/low cholesterol diet  Limit/avoid red meat  Eat more lean meat - chicken breast, ground turkey, fish  Exercise 30 mins at least 3 times a week as tolerated  SOB (shortness of breath)    Increase patient's hydrochlorothiazide, will get echocardiogram as murmur was heard on exam       BMI Counseling: Body mass index is 26 86 kg/m²  The BMI is above normal  Nutrition recommendations include decreasing portion sizes, encouraging healthy choices of fruits and vegetables, decreasing fast food intake, consuming healthier snacks, limiting drinks that contain sugar, moderation in carbohydrate intake, increasing intake of lean protein, reducing intake of saturated and trans fat and reducing intake of cholesterol  Exercise recommendations include moderate physical activity 150 minutes/week and exercising 3-5 times per week         Depression Screening and Follow-up Plan: Patient's depression screening was positive with a PHQ-2 score of 0  Clincally patient does not have depression  No treatment is required  Diagnoses and all orders for this visit:    SOB (shortness of breath)  -     Echo stress test w contrast if indicated; Future    Essential hypertension  -     losartan-hydrochlorothiazide (HYZAAR) 100-12 5 MG per tablet; Take 1 tablet by mouth daily  -     CBC and differential  -     Comprehensive metabolic panel; Future  -     TSH, 3rd generation with Free T4 reflex  -     Lipid panel  -     losartan (COZAAR) 50 mg tablet; Take 1 tablet (50 mg total) by mouth daily  -     hydrochlorothiazide (HYDRODIURIL) 25 mg tablet; Take 1 tablet (25 mg total) by mouth daily    Other osteoporosis without current pathological fracture  -     alendronate (Fosamax) 70 mg tablet; Take 1 tablet (70 mg total) by mouth every 7 days  -     Vitamin D 25 hydroxy    Murmur  -     Echo stress test w contrast if indicated; Future    Vitamin D insufficiency    Other hyperlipidemia          Subjective:      Patient ID: Triny Saldana is a 70 y o  female  Patient presents today to follow-up on hypertension, patient is currently taking losartan 100 mg tablet daily,  and  Hydrochlorothiazide 12 5 mg    Reviewed blood work with patient while in the office today  Patient reports shortness of breath when walking up and down steps, and she also reports increased edema to bilateral lower extremities  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 [employer]  Colonoscopy- family history of colon cancer, 2018  Gyn-does not want anymore pap smears, denies abnormal paps in the past, denies abnormal bleeding   Mammogram-will give script      Hypertension   This is a chronic problem  The current episode started more than 1 year ago  The problem is unchanged  The problem is controlled  Associated symptoms include shortness of breath (going up and down steps)  Pertinent negatives include no anxiety, blurred vision, chest pain, headaches, malaise/fatigue, neck pain, orthopnea, palpitations, peripheral edema, PND or sweats  Risk factors for coronary artery disease include sedentary lifestyle and dyslipidemia  Treatments tried:   Patient currently taking amlodipine as well as losartan  And hydrochlorothiazide  The current treatment provides mild improvement  Compliance problems include diet and exercise  The following portions of the patient's history were reviewed and updated as appropriate: allergies, current medications, past family history, past medical history, past social history, past surgical history and problem list     Review of Systems   Constitutional: Negative for activity change, appetite change, chills, diaphoresis, fever and malaise/fatigue  HENT: Negative for congestion, ear discharge, ear pain, postnasal drip, rhinorrhea, sinus pressure, sinus pain and sore throat  Eyes: Negative for blurred vision, pain, discharge, itching and visual disturbance  Respiratory: Positive for shortness of breath (going up and down steps)  Negative for cough, chest tightness and wheezing  Cardiovascular: Positive for leg swelling (to bilateral lower extermities )  Negative for chest pain, palpitations, orthopnea and PND  Gastrointestinal: Negative for abdominal pain, constipation, diarrhea, nausea and vomiting  Endocrine: Negative for polydipsia, polyphagia and polyuria  Genitourinary: Negative for difficulty urinating, dysuria and urgency  Musculoskeletal: Negative for arthralgias, back pain and neck pain  Skin: Negative for rash and wound  Neurological: Negative for dizziness, weakness, numbness and headaches           Past Medical History:   Diagnosis Date    Arthritis     Cellulitis of face 10/16/2019    Bilateral nares    Family history of colon cancer in mother     Functional murmur     Hypertension     Dx'd in 2003, controlled     Osteoporosis     Screening for breast cancer 2018    Viral upper respiratory tract infection 10/30/2018         Current Outpatient Medications:     alendronate (Fosamax) 70 mg tablet, Take 1 tablet (70 mg total) by mouth every 7 days, Disp: 12 tablet, Rfl: 3    Ascorbic Acid (VITAMIN C PO), Take 2 tablets by mouth daily , Disp: , Rfl:     losartan-hydrochlorothiazide (HYZAAR) 100-12 5 MG per tablet, Take 1 tablet by mouth daily, Disp: 90 tablet, Rfl: 1    Omega-3 Fatty Acids (FISH OIL PO), Take 1 capsule by mouth daily , Disp: , Rfl:     Red Yeast Rice Extract (RED YEAST RICE PO), Take 1 tablet by mouth daily , Disp: , Rfl:     TURMERIC PO, Take 1 tablet by mouth daily , Disp: , Rfl:     hydrochlorothiazide (HYDRODIURIL) 25 mg tablet, Take 1 tablet (25 mg total) by mouth daily, Disp: 90 tablet, Rfl: 1    losartan (COZAAR) 50 mg tablet, Take 1 tablet (50 mg total) by mouth daily, Disp: 90 tablet, Rfl: 1    Allergies   Allergen Reactions    Flonase [Fluticasone] Dermatitis       Social History   Past Surgical History:   Procedure Laterality Date     SECTION      x2, 1972 &     FLEXIBLE SIGMOIDOSCOPY      ID COLONOSCOPY FLX DX W/COLLJ SPEC WHEN PFRMD N/A 10/10/2016    Procedure: COLONOSCOPY;  Surgeon: Marlon Ahmadi MD;  [facility];   Service: Gastroenterology    TOE SURGERY Bilateral     hammer toe correction surgery    TONSILLECTOMY      with Adenoidectomy     Family History   Problem Relation Age of Onset    Colon cancer Mother 68    No Known Problems Father     No Known Problems Sister     No Known Problems Daughter     No Known Problems Maternal Grandmother     No Known Problems Maternal Grandfather     No Known Problems Paternal Grandmother     No Known Problems Paternal Grandfather     No Known Problems Son     No Known Problems Son     No Known Problems Paternal Aunt     No Known Problems Paternal Aunt     No Known Problems Paternal Aunt     No Known Problems Paternal Aunt        Objective:  /72 (BP Location: Left arm, Patient Position: Sitting, Cuff Size: Standard)   Pulse 86   Temp 98 4 °F (36 9 °C) (Temporal)   Ht 4' 11 84" (1 52 m)   Wt 62 1 kg (136 lb 12 8 oz)   SpO2 98%   BMI 26 86 kg/m²     No results found for this or any previous visit (from the past 1344 hour(s))  Physical Exam   Constitutional: She is oriented to person, place, and time  She appears well-developed and well-nourished  No distress  HENT:   Head: Normocephalic and atraumatic  Right Ear: External ear normal    Left Ear: External ear normal    Nose: Nose normal    Mouth/Throat: Oropharynx is clear and moist  No oropharyngeal exudate  Eyes: Pupils are equal, round, and reactive to light  Conjunctivae and EOM are normal  Right eye exhibits no discharge  Left eye exhibits no discharge  Neck: Normal range of motion  Neck supple  No thyromegaly present  Cardiovascular: Normal rate, regular rhythm and intact distal pulses  Exam reveals no gallop and no friction rub  Murmur heard  Systolic murmur is present with a grade of 2/6  Pulmonary/Chest: Effort normal and breath sounds normal  No stridor  No respiratory distress  She has no wheezes  She has no rales  Abdominal: Soft  Bowel sounds are normal  She exhibits no distension  There is no tenderness  Musculoskeletal: She exhibits edema (  Moderate edema noted to bilateral lower extremities)  Lymphadenopathy:     She has no cervical adenopathy  Neurological: She is alert and oriented to person, place, and time  Skin: Skin is warm and dry  No rash noted  She is not diaphoretic  No erythema  Psychiatric: She has a normal mood and affect   Her behavior is normal  Judgment and thought content normal

## 2020-07-31 PROBLEM — R06.02 SOB (SHORTNESS OF BREATH): Status: ACTIVE | Noted: 2020-07-31

## 2020-07-31 PROBLEM — Z12.39 SCREENING FOR BREAST CANCER: Status: RESOLVED | Noted: 2018-12-05 | Resolved: 2020-07-31

## 2020-07-31 PROBLEM — L03.211 CELLULITIS OF FACE: Status: RESOLVED | Noted: 2019-10-16 | Resolved: 2020-07-31

## 2020-07-31 PROBLEM — J06.9 VIRAL UPPER RESPIRATORY TRACT INFECTION: Status: RESOLVED | Noted: 2018-10-30 | Resolved: 2020-07-31

## 2020-07-31 NOTE — ASSESSMENT & PLAN NOTE
Will reduce losartan to 50 mg tablet daily, and increase hydrochlorothiazide 25 mg tablet daily  Continue to monitor blood pressure at home  Goal BP is < 130/80  Contact our office for consistent elevations  Recommend low sodium diet  Exercise 30 minutes three times a week as tolerated    Recommend yearly eye exam

## 2020-07-31 NOTE — ASSESSMENT & PLAN NOTE
Will check with Ela to see if patient qualifies for Prolia  A daily intake of calcium of at least 1200 mg and vitamin D, 800-1000 IU, as well as weight bearing and muscle strengthening exercise, fall prevention and avoidance of tobacco and excessive alcohol intake as basic preventive measures are recommended  Patient continues on Fosamax

## 2020-10-26 ENCOUNTER — TELEPHONE (OUTPATIENT)
Dept: INTERNAL MEDICINE CLINIC | Age: 75
End: 2020-10-26

## 2020-10-26 DIAGNOSIS — R06.02 SOB (SHORTNESS OF BREATH): Primary | ICD-10-CM

## 2020-10-26 DIAGNOSIS — R01.1 MURMUR: ICD-10-CM

## 2020-11-19 ENCOUNTER — TELEPHONE (OUTPATIENT)
Dept: LAB | Facility: HOSPITAL | Age: 75
End: 2020-11-19

## 2020-12-04 ENCOUNTER — APPOINTMENT (OUTPATIENT)
Dept: LAB | Facility: HOSPITAL | Age: 75
End: 2020-12-04
Payer: COMMERCIAL

## 2020-12-04 DIAGNOSIS — I10 ESSENTIAL HYPERTENSION: ICD-10-CM

## 2020-12-04 LAB
25(OH)D3 SERPL-MCNC: 33.2 NG/ML (ref 30–100)
ALBUMIN SERPL BCP-MCNC: 3.8 G/DL (ref 3.5–5)
ALP SERPL-CCNC: 76 U/L (ref 46–116)
ALT SERPL W P-5'-P-CCNC: 22 U/L (ref 12–78)
ANION GAP SERPL CALCULATED.3IONS-SCNC: 7 MMOL/L (ref 4–13)
AST SERPL W P-5'-P-CCNC: 14 U/L (ref 5–45)
BASOPHILS # BLD AUTO: 0.07 THOUSANDS/ΜL (ref 0–0.1)
BASOPHILS NFR BLD AUTO: 1 % (ref 0–1)
BILIRUB SERPL-MCNC: 0.69 MG/DL (ref 0.2–1)
BUN SERPL-MCNC: 24 MG/DL (ref 5–25)
CALCIUM SERPL-MCNC: 9.9 MG/DL (ref 8.3–10.1)
CHLORIDE SERPL-SCNC: 110 MMOL/L (ref 100–108)
CHOLEST SERPL-MCNC: 224 MG/DL (ref 50–200)
CO2 SERPL-SCNC: 25 MMOL/L (ref 21–32)
CREAT SERPL-MCNC: 0.92 MG/DL (ref 0.6–1.3)
EOSINOPHIL # BLD AUTO: 0.22 THOUSAND/ΜL (ref 0–0.61)
EOSINOPHIL NFR BLD AUTO: 3 % (ref 0–6)
ERYTHROCYTE [DISTWIDTH] IN BLOOD BY AUTOMATED COUNT: 12.7 % (ref 11.6–15.1)
GFR SERPL CREATININE-BSD FRML MDRD: 62 ML/MIN/1.73SQ M
GLUCOSE SERPL-MCNC: 98 MG/DL (ref 65–140)
HCT VFR BLD AUTO: 40.4 % (ref 34.8–46.1)
HDLC SERPL-MCNC: 108 MG/DL
HGB BLD-MCNC: 13.6 G/DL (ref 11.5–15.4)
IMM GRANULOCYTES # BLD AUTO: 0.02 THOUSAND/UL (ref 0–0.2)
IMM GRANULOCYTES NFR BLD AUTO: 0 % (ref 0–2)
LDLC SERPL CALC-MCNC: 104 MG/DL (ref 0–100)
LYMPHOCYTES # BLD AUTO: 2.25 THOUSANDS/ΜL (ref 0.6–4.47)
LYMPHOCYTES NFR BLD AUTO: 34 % (ref 14–44)
MCH RBC QN AUTO: 33.8 PG (ref 26.8–34.3)
MCHC RBC AUTO-ENTMCNC: 33.7 G/DL (ref 31.4–37.4)
MCV RBC AUTO: 101 FL (ref 82–98)
MONOCYTES # BLD AUTO: 0.48 THOUSAND/ΜL (ref 0.17–1.22)
MONOCYTES NFR BLD AUTO: 7 % (ref 4–12)
NEUTROPHILS # BLD AUTO: 3.5 THOUSANDS/ΜL (ref 1.85–7.62)
NEUTS SEG NFR BLD AUTO: 55 % (ref 43–75)
NONHDLC SERPL-MCNC: 116 MG/DL
NRBC BLD AUTO-RTO: 0 /100 WBCS
PLATELET # BLD AUTO: 264 THOUSANDS/UL (ref 149–390)
PMV BLD AUTO: 9.6 FL (ref 8.9–12.7)
POTASSIUM SERPL-SCNC: 4 MMOL/L (ref 3.5–5.3)
PROT SERPL-MCNC: 6.9 G/DL (ref 6.4–8.2)
RBC # BLD AUTO: 4.02 MILLION/UL (ref 3.81–5.12)
SODIUM SERPL-SCNC: 142 MMOL/L (ref 136–145)
TRIGL SERPL-MCNC: 62 MG/DL
TSH SERPL DL<=0.05 MIU/L-ACNC: 0.38 UIU/ML (ref 0.36–3.74)
WBC # BLD AUTO: 6.54 THOUSAND/UL (ref 4.31–10.16)

## 2020-12-04 PROCEDURE — 80061 LIPID PANEL: CPT | Performed by: NURSE PRACTITIONER

## 2020-12-04 PROCEDURE — 80053 COMPREHEN METABOLIC PANEL: CPT

## 2020-12-04 PROCEDURE — 85025 COMPLETE CBC W/AUTO DIFF WBC: CPT | Performed by: NURSE PRACTITIONER

## 2020-12-04 PROCEDURE — 84443 ASSAY THYROID STIM HORMONE: CPT | Performed by: NURSE PRACTITIONER

## 2020-12-04 PROCEDURE — 36415 COLL VENOUS BLD VENIPUNCTURE: CPT | Performed by: NURSE PRACTITIONER

## 2020-12-04 PROCEDURE — 82306 VITAMIN D 25 HYDROXY: CPT | Performed by: NURSE PRACTITIONER

## 2020-12-15 ENCOUNTER — OFFICE VISIT (OUTPATIENT)
Dept: INTERNAL MEDICINE CLINIC | Facility: CLINIC | Age: 75
End: 2020-12-15
Payer: COMMERCIAL

## 2020-12-15 VITALS
OXYGEN SATURATION: 99 % | HEIGHT: 60 IN | SYSTOLIC BLOOD PRESSURE: 138 MMHG | HEART RATE: 74 BPM | WEIGHT: 134.4 LBS | BODY MASS INDEX: 26.39 KG/M2 | TEMPERATURE: 99.1 F | DIASTOLIC BLOOD PRESSURE: 86 MMHG

## 2020-12-15 DIAGNOSIS — E34.9 ELEVATED PARATHYROID HORMONE: ICD-10-CM

## 2020-12-15 DIAGNOSIS — Z13.1 SCREENING FOR DIABETES MELLITUS: ICD-10-CM

## 2020-12-15 DIAGNOSIS — Z00.00 WELCOME TO MEDICARE PREVENTIVE VISIT: ICD-10-CM

## 2020-12-15 DIAGNOSIS — M81.8 OTHER OSTEOPOROSIS WITHOUT CURRENT PATHOLOGICAL FRACTURE: ICD-10-CM

## 2020-12-15 DIAGNOSIS — I83.812 VARICOSE VEINS OF LEFT LOWER EXTREMITY WITH PAIN: ICD-10-CM

## 2020-12-15 DIAGNOSIS — E55.9 VITAMIN D INSUFFICIENCY: ICD-10-CM

## 2020-12-15 DIAGNOSIS — Z12.31 ENCOUNTER FOR SCREENING MAMMOGRAM FOR BREAST CANCER: ICD-10-CM

## 2020-12-15 DIAGNOSIS — E78.00 HYPERCHOLESTEROLEMIA: ICD-10-CM

## 2020-12-15 DIAGNOSIS — Z12.12 SCREENING FOR COLORECTAL CANCER: ICD-10-CM

## 2020-12-15 DIAGNOSIS — I10 BENIGN ESSENTIAL HYPERTENSION: Primary | ICD-10-CM

## 2020-12-15 DIAGNOSIS — Z12.11 SCREENING FOR COLORECTAL CANCER: ICD-10-CM

## 2020-12-15 DIAGNOSIS — Z13.6 SCREENING FOR CARDIOVASCULAR CONDITION: ICD-10-CM

## 2020-12-15 PROBLEM — R06.02 SOB (SHORTNESS OF BREATH): Status: RESOLVED | Noted: 2020-07-31 | Resolved: 2020-12-15

## 2020-12-15 PROBLEM — R79.89 ELEVATED PARATHYROID HORMONE: Status: ACTIVE | Noted: 2017-11-10

## 2020-12-15 PROCEDURE — 3079F DIAST BP 80-89 MM HG: CPT | Performed by: INTERNAL MEDICINE

## 2020-12-15 PROCEDURE — 3075F SYST BP GE 130 - 139MM HG: CPT | Performed by: INTERNAL MEDICINE

## 2020-12-15 PROCEDURE — G0403 EKG FOR INITIAL PREVENT EXAM: HCPCS | Performed by: INTERNAL MEDICINE

## 2020-12-15 PROCEDURE — 99214 OFFICE O/P EST MOD 30 MIN: CPT | Performed by: INTERNAL MEDICINE

## 2020-12-15 PROCEDURE — 1125F AMNT PAIN NOTED PAIN PRSNT: CPT | Performed by: INTERNAL MEDICINE

## 2020-12-15 PROCEDURE — G0402 INITIAL PREVENTIVE EXAM: HCPCS | Performed by: INTERNAL MEDICINE

## 2020-12-15 PROCEDURE — 3008F BODY MASS INDEX DOCD: CPT | Performed by: INTERNAL MEDICINE

## 2020-12-15 PROCEDURE — 3725F SCREEN DEPRESSION PERFORMED: CPT | Performed by: INTERNAL MEDICINE

## 2020-12-15 PROCEDURE — 1036F TOBACCO NON-USER: CPT | Performed by: INTERNAL MEDICINE

## 2020-12-15 PROCEDURE — 1170F FXNL STATUS ASSESSED: CPT | Performed by: INTERNAL MEDICINE

## 2020-12-15 PROCEDURE — 1160F RVW MEDS BY RX/DR IN RCRD: CPT | Performed by: INTERNAL MEDICINE

## 2020-12-15 RX ORDER — LOSARTAN POTASSIUM AND HYDROCHLOROTHIAZIDE 12.5; 5 MG/1; MG/1
1 TABLET ORAL DAILY
Qty: 90 TABLET | Refills: 1 | Status: SHIPPED | OUTPATIENT
Start: 2020-12-15

## 2021-01-06 ENCOUNTER — TELEPHONE (OUTPATIENT)
Dept: VASCULAR SURGERY | Facility: CLINIC | Age: 76
End: 2021-01-06

## 2021-01-06 NOTE — TELEPHONE ENCOUNTER
COVID Pre-Visit Screening     1  Is this a family member screening? No  2  Have you traveled outside of your state in the past 2 weeks? No  3  Do you presently have a fever or flu-like symptoms? No  4  Do you have symptoms of an upper respiratory infection like runny nose, sore throat, or cough? No  5  Are you suffering from new headache that you have not had in the past?  No  6  Do you have/have you experienced any new shortness of breath recently? No  7  Do you have any new diarrhea, nausea or vomiting? No  8  Have you been in contact with anyone who has been sick or diagnosed with COVID-19? No  9  Do you have any new loss of taste or smell? No  10  Are you able to wear a mask without a valve for the entire visit?  Yes    Patient coming in alone, no to all screening questions

## 2021-01-07 ENCOUNTER — CONSULT (OUTPATIENT)
Dept: VASCULAR SURGERY | Facility: CLINIC | Age: 76
End: 2021-01-07
Payer: COMMERCIAL

## 2021-01-07 VITALS
BODY MASS INDEX: 29.12 KG/M2 | DIASTOLIC BLOOD PRESSURE: 82 MMHG | RESPIRATION RATE: 16 BRPM | TEMPERATURE: 97.5 F | HEIGHT: 57 IN | HEART RATE: 58 BPM | SYSTOLIC BLOOD PRESSURE: 124 MMHG | WEIGHT: 135 LBS

## 2021-01-07 DIAGNOSIS — I83.812 VARICOSE VEINS OF LEFT LOWER EXTREMITY WITH PAIN: ICD-10-CM

## 2021-01-07 DIAGNOSIS — R60.0 BILATERAL LOWER EXTREMITY EDEMA: Primary | ICD-10-CM

## 2021-01-07 PROCEDURE — 3008F BODY MASS INDEX DOCD: CPT | Performed by: SURGERY

## 2021-01-07 PROCEDURE — 3074F SYST BP LT 130 MM HG: CPT | Performed by: SURGERY

## 2021-01-07 PROCEDURE — 3079F DIAST BP 80-89 MM HG: CPT | Performed by: SURGERY

## 2021-01-07 PROCEDURE — 1036F TOBACCO NON-USER: CPT | Performed by: SURGERY

## 2021-01-07 PROCEDURE — 99202 OFFICE O/P NEW SF 15 MIN: CPT | Performed by: SURGERY

## 2021-01-07 NOTE — PATIENT INSTRUCTIONS
1) Varicose veins and leg swelling  -you have some swelling and painful varicosities that are most likely due to venous insufficiency  -we are going to start medical management for this  -please get some compression and wear these daily and remove at night for sleep  -please try to elevate your legs as much as possible throughout the day in addition to night time  -continue your increased exercise and weight loss routine    -if you would like to buy compression online, "[employer]"  Look for "gradient compression" 15-20mmHg is the lightest weight and 20-30mmHg is medium weight;  (my favorite brand is Sigvaris)    Leg Edema   WHAT YOU NEED TO KNOW:   Leg edema is swelling caused by fluid buildup  Your legs may swell if you sit or stand for long periods of time, are pregnant, or are injured  Swelling may also occur if you have heart failure or circulation problems  This means that your heart does not pump blood through your body as it should  DISCHARGE INSTRUCTIONS:   Call your local emergency number (911 in the [facility]) for any of the following:   · You cannot walk  · You have chest pain or trouble breathing that is worse when you lie down  · You suddenly feel lightheaded and have trouble breathing  · You have new and sudden chest pain  You may have more pain when you take deep breaths or cough  · You cough up blood  Return to the emergency department if:   · You feel faint or confused  · Your skin turns blue or gray  · Your leg feels warm, tender, and painful  It may be swollen and red  Call your doctor if:   · You have a fever or feel more tired than usual     · The veins in your legs look larger than usual  They may look full or bulging  · Your legs itch or feel heavy  · You have red or white areas or sores on your legs  The skin may also appear dimpled or have indentations  · You are gaining weight  · You have trouble moving your ankles      · The swelling does not go away, or other parts of your body swell  · You have questions or concerns about your condition or care  Self-care:   · Elevate your legs  Raise your legs above the level of your heart as often as you can  This will help decrease swelling and pain  Prop your legs on pillows or blankets to keep them elevated comfortably  · Wear pressure stockings, if directed  These tight stockings put pressure on your legs to promote blood flow and prevent blood clots  Put them on before you get out of bed  Wear the stockings during the day  Do not wear them while you sleep  · Stay active  Do not stand or sit for long periods of time  Ask your healthcare provider about the best exercise plan for you  · Eat healthy foods  Healthy foods include fruits, vegetables, whole-grain breads, low-fat dairy products, beans, lean meats, and fish  Ask if you need to be on a special diet  · Limit sodium (salt)  Salt will make your body hold even more fluid  Your healthcare provider will tell you how many milligrams (mg) of salt you can have each day  Follow up with your doctor as directed:  Write down your questions so you remember to ask them during your visits  © Copyright [address] [place] [address] Information is for End User's use only and may not be sold, redistributed or otherwise used for commercial purposes  All illustrations and images included in CareNotes® are the copyrighted property of A D A M [employer] Inc  or [facility]ifford   The above information is an  only  It is not intended as medical advice for individual conditions or treatments  Talk to your doctor, nurse or pharmacist before following any medical regimen to see if it is safe and effective for you  Venous Insufficiency   WHAT YOU NEED TO KNOW:   What is venous insufficiency? Venous insufficiency is a condition that prevents blood from flowing out of your legs and back to your heart   Veins contain valves that help blood flow in one direction  Venous insufficiency means the valves do not close correctly or fully  Blood flows back and pools in your leg  This can cause problems such as varicose veins  Venous insufficiency may also be called chronic venous insufficiency or venous stasis  What increases my risk for venous insufficiency? · A leg injury or blood clot    · Being a woman    · Pregnancy    · Older age    · A family history of varicose veins    · Smoking cigarettes    · Obesity, or not getting enough exercise    What are the signs and symptoms of venous insufficiency? · Visible veins on your legs that may be small and red or large, thick, and blue    · Swelling in your ankles or calves    · Changes in skin color, such as dark or purple skin    · An ulcer (open sore) on your leg    · Leg pain that is worse when you are menstruating (women) or when you stand, and better when you elevate your legs    · Burning or itching    · Cramps that happen at night    · Thick, hard skin on your legs and ankles    · Feeling of heaviness in your legs    How is venous insufficiency diagnosed? · Venous duplex imaging  is a procedure used to examine the blood flow through veins  A gel will be applied to your legs  Your healthcare provider will slide a small device called a transducer across the veins  The transducer is a microphone that helps your healthcare provider hear blood moving through the vein  · Contrast venography  is a procedure used to show the veins on x-ray pictures  A catheter is guided into the vein  Contrast liquid is injected into the catheter to help the veins show up better in the pictures  Tell the healthcare provider if you have ever had an allergic reaction to contrast liquid  · Plethysmography  is a procedure that may be used to find changes in blood pressure through your veins  You will wear a blood pressure cuff on your leg   Changes in pressure and the amount of blood that can circulate through your leg veins are measured  Pressures are measured while you stand, sit, and lift your leg  How is venous insufficiency treated? · Medicine  may be given to improve blood flow  The medicines may thin your blood or reduce swelling to help blood flow  You may also need medicine to treat a bacterial infection  · Ablation  is a procedure used to close varicose veins  A catheter is guided until it is near the vein  A device will then be guided to the area  The device may produce energy through radiofrequency or a laser  The energy creates heat that will close the blood vessel  · Sclerotherapy  is a procedure used to fade visible veins  Your healthcare provider will inject a liquid into a spider vein or varicose vein  The liquid causes irritation in the vein  The vein swells and sticks together  Your body will then absorb the vein  · Surgery  may be needed if other treatments do not work  Surgery may be used to repair a leg vein valve or to clip or tie off a vein so blood cannot flow through it  You may need to have a veins removed during surgery called stripping  Surgery may be used to bypass (go around) the damaged vein  Blood will flow through a vein transplanted from another part of your body  What can I do to manage my symptoms? · Wear pressure stockings as directed  Pressure stockings help keep blood from pooling in your leg veins  Your healthcare provider can prescribe stockings that are right for you  Do not buy over-the-counter pressure stockings unless your healthcare provider says it is okay  They may not fit correctly or may have elastic that cuts off your circulation  Ask your healthcare provider when to start wearing pressure stockings and how long to wear them each day  · Do not sit or stand for long periods of time  If you have to sit for a long time, flex and extend your legs, feet, and ankles  Do this about 10 times every 30 minutes to help keep blood flowing   If you have to stand for a long time, take breaks and sit with your legs elevated  · Elevate your legs  Elevate your legs above the level of your heart to reduce swelling  Your healthcare provider may recommend that you keep your legs elevated for 30 minutes at a time  You may need to do this 3 to 4 times per day, or more if your healthcare provider recommends  · Do not smoke  Nicotine and other chemicals in cigarettes and cigars can cause blood vessel damage  Ask your healthcare provider for information if you currently smoke and need help to quit  E-cigarettes or smokeless tobacco still contain nicotine  Talk to your healthcare provider before you use these products  · Reach or maintain a healthy weight  Extra weight can make venous insufficiency worse  Ask your healthcare provider how much you should weigh  He can help you create a weight loss plan if you need to lose weight  · Exercise as directed  Walking can help increase blood flow in your calves  Ask your healthcare provider how much exercise you need each day and which exercises are best for you  · Care for your skin  Keep your skin clean  Do not use any soaps or lotions that may dry your skin  For example, do not use products that contain fragrance or alcohol  If you have a skin ulcer, your healthcare provider may recommend a wet-to-dry bandage  To do this, apply a wet bandage to your wound and allow it to dry  This will help remove drainage from your wound each time you change the bandage  Your healthcare provider will tell you how often to change your bandage and which kind of bandage to use  Check your wound for signs of infection, such as swelling or pus  · Go to physical therapy (PT) as directed  A physical therapist can help you increase movement and range of motion in your legs  When should I seek immediate care? · You have a wound that does not heal or is infected      · You have an injury that has broken your skin and caused your varicose veins to bleed  · Your leg is swollen and hard  · You have pain in your leg that does not go away or gets worse  · Your legs or feet are turning blue or black  · Your leg feels warm, tender, and painful  It may look swollen and red  When should I contact my healthcare provider? · You have a fever  · You have varicose veins and they are painful  · You have new or worsening leg pain, swelling, or redness  · You have new or worsening ulcers or other sores on your leg  · You have questions or concerns about your condition or care  CARE AGREEMENT:   You have the right to help plan your care  Learn about your health condition and how it may be treated  Discuss treatment options with your healthcare providers to decide what care you want to receive  You always have the right to refuse treatment  The above information is an  only  It is not intended as medical advice for individual conditions or treatments  Talk to your doctor, nurse or pharmacist before following any medical regimen to see if it is safe and effective for you  © Copyright [address] Information is for End User's use only and may not be sold, redistributed or otherwise used for commercial purposes   All illustrations and images included in CareNotes® are the copyrighted property of [employer]  or [address] [place] [address]

## 2021-01-26 ENCOUNTER — VBI (OUTPATIENT)
Dept: ADMINISTRATIVE | Facility: OTHER | Age: 76
End: 2021-01-26

## 2021-02-13 DIAGNOSIS — Z23 ENCOUNTER FOR IMMUNIZATION: ICD-10-CM

## 2021-03-06 ENCOUNTER — IMMUNIZATIONS (OUTPATIENT)
Dept: FAMILY MEDICINE CLINIC | Facility: HOSPITAL | Age: 76
End: 2021-03-06

## 2021-03-06 DIAGNOSIS — Z23 ENCOUNTER FOR IMMUNIZATION: Primary | ICD-10-CM

## 2021-03-06 PROCEDURE — 0001A SARS-COV-2 / COVID-19 MRNA VACCINE (PFIZER-BIONTECH) 30 MCG: CPT

## 2021-03-06 PROCEDURE — 91300 SARS-COV-2 / COVID-19 MRNA VACCINE (PFIZER-BIONTECH) 30 MCG: CPT

## 2021-03-25 ENCOUNTER — IMMUNIZATIONS (OUTPATIENT)
Dept: FAMILY MEDICINE CLINIC | Facility: HOSPITAL | Age: 76
End: 2021-03-25

## 2021-03-25 DIAGNOSIS — Z23 ENCOUNTER FOR IMMUNIZATION: Primary | ICD-10-CM

## 2021-03-25 PROCEDURE — 0002A SARS-COV-2 / COVID-19 MRNA VACCINE (PFIZER-BIONTECH) 30 MCG: CPT

## 2021-03-25 PROCEDURE — 91300 SARS-COV-2 / COVID-19 MRNA VACCINE (PFIZER-BIONTECH) 30 MCG: CPT

## 2021-06-08 DIAGNOSIS — I10 BENIGN ESSENTIAL HYPERTENSION: ICD-10-CM

## 2021-06-09 RX ORDER — LOSARTAN POTASSIUM AND HYDROCHLOROTHIAZIDE 12.5; 5 MG/1; MG/1
TABLET ORAL
Qty: 90 TABLET | Refills: 1 | OUTPATIENT
Start: 2021-06-09

## 2021-06-25 ENCOUNTER — TRANSITIONAL CARE MANAGEMENT (OUTPATIENT)
Dept: INTERNAL MEDICINE CLINIC | Facility: CLINIC | Age: 76
End: 2021-06-25

## 2021-08-09 ENCOUNTER — CLINICAL SUPPORT (OUTPATIENT)
Dept: CARDIAC REHAB | Facility: CLINIC | Age: 76
End: 2021-08-09
Payer: COMMERCIAL

## 2021-08-09 DIAGNOSIS — Z95.2 S/P MVR (MITRAL VALVE REPLACEMENT): ICD-10-CM

## 2021-08-09 DIAGNOSIS — Z98.890 S/P MVR (MITRAL VALVE REPAIR): ICD-10-CM

## 2021-08-09 PROCEDURE — 93797 PHYS/QHP OP CAR RHAB WO ECG: CPT

## 2021-08-09 NOTE — PROGRESS NOTES
Cardiac Rehabilitation Plan of Care   Initial Care Plan          Today's date: 2021   # of Exercise Sessions Completed: 1  Patient name: Esperanza Grossman      : 1945  Age: 78 y o  MRN: [id number]  Referring Physician: Shiva Benedict MD  Cardiologist: Jennifer Charles MD  Provider: John Fitzpatrick  Clinician: Alannah Pickens MS, CEP    Dx:   Encounter Diagnoses   Name Primary?  S/P MVR (mitral valve replacement)     S/P MVR (mitral valve repair)      Date of onset: 2021      SUMMARY OF PROGRESS:  Today is Rachelle's initial evaluation to begin Cardiac Rehab now 7 wks post MV repair  She was experiencing Dizziness prior to discovery of her valve The patient does currently follow a formal exercise program at home, including walking 1 miles daily  She has resumed all ADLs following sternal restrictions  Depression screening using the PHQ-9 interprets the patient's score 1-4 = Minimal Depression  INÉS-7 screening tool for anxiety suggests 0-4  = Not anxious  When addressed, the patient denies having depression/anxiety  Patient reports excellent social/emotional support  Information to begin using [facility] was provided as well as contact information for counseling through [facility]  PHQ-9 score will be reassessed in 30 days  The patient is a non-smoker  Patient admits to 100% medication compliance  Patient reports the following physical limitations: None  The patient completed an initial submaximal TM ETT  The patient completed 2 minutes of stage 1 (2 2 METs) with test termination of RPE 6  Resting  /74 with appropriate hemodynamic response to exercise reaching 132/90  Patient denied symptoms during exercise  Telemetry revealed NSR during rest and exercise  Patient was counseled on exercise guidelines to achieve a minimum of 150 mins/wk of moderate intensity (RPE 4-6) exercise and encouraged to add 1-2 days of exercise on opposite days of cardiac rehab as tolerated   We discussed current dietary habits and goals of heart healthy eating for lipid management and weight loss  Patient's goals include: increased   The patient's CAD risk factors include:  obesity/overweight, hypertension and hyperlipidemia  Her education will focus on lifestyle modification/education specific to Her needs  Patient will attend group education classes on heart healthy eating, reading food labels, stress management, risk factor reduction, understanding heart disease and common heart medications  Patient will attend 35 monitored exercise sessions, 3x/wk for 12-18 weeks beginning 2021         Medication compliance: Yes   Comments: Pt reports to be compliant with medications  Fall Risk: Low   Comments: Ambulates with a steady gait with no assist device    EKG Interpretation: NSR      EXERCISE ASSESSMENT and PLAN    Current Exercise Program in Rehab:       Frequency: 3 days/week Supplement with home exercise 2+ days/wk as tolerated       Minutes: 30         METS: 2-3            HR:    RPE: 4-6         Modalities: UBE, NuStep and Recumbent bike      Exercise Progression 30 Day Goals :    Frequency: 3 days/week of cardiac rehab     Supplement with home exercise 2+ days/wk as tolerated    Minutes: 30-40                            >150 mins/wk of moderate intensity exercise   METS: 2-3   HR:     RPE: 4-6   Modalities: Treadmill, UBE, NuStep and Recumbent bike    Strength trainin-3 days / week  12-15 repetitions  1-2 sets per modality    Modalities: Leg Press, Chest Press, Pull Downs, Arm Curl and Seated Row    Home Exercise: Type: walking , Frequency: 5-6 days/week, Duration: 20-30 mins, Distance 1 mile    Goals: 10% improvement in functional capacity - based on max METs achieved in fitness assessment, Reduced dyspnea with physical activity  0-1/10, improved DASI score by 10%, Increase in exercise capacity by 40% - based on peak METs tolerated in cardiac rehab exercise session, Exercise 5 days/wk, >150mins/wk of moderate intensity exercise, Attend Rehab regularly and start a home exercise program    Progression Toward Goals:  Reviewed Pt goals and determined plan of care, Will continue to educate and progress as tolerated  Education: benefit of exercise for CAD risk factors, home exercise guidelines, AHA guidelines to achieve >150 mins/wk of moderate exercise and RPE scale   Plan:education on home exercise guidelines and home exercise 30+ mins 2 days opposite CR  Readiness to change: Preparation:  (Getting ready to change)       NUTRITION ASSESSMENT AND PLAN    Weight control:    Starting weight: 138 6 lbs    Current weight:     Waist circumference:    Startin 5 in    Current:      Diabetes: N/A  A1c: 5 0     last measured:     Lipid management: Discussed diet and lipid management and Last lipid profile 2020  Chol 224  TRG 62        Goals:reduced BMI to < 25, LDL <100, CHOL <200, decreased body fat % <33%  (F), reduced waist circumference <35 inches (F), Improved Rate Your Plate score  >16, 1 7-5%  wt loss, choose lean meat (93-95%), eliminate processed meats, cook without added fat or use vegetable oil/spray, eat 3 or more servings of whole grains a day, Eat 4-5 cups of fruits and vegetables daily, use olive or canola oil in baking, choose low sodium processed foods, eliminate butter, use fat-free dressings/canales or seldom use, seldom eat or choose low fat ice-cream, fruit juice bars or frozen yogurt , eliminate or choose low-fat sweets and daily saturated fat intake <7%/13g    Progression Toward Goals: Reviewed Pt goals and determined plan of care, Will continue to educate and progress as tolerated      Education: heart healthy eating  low sodium diet  hydration  nutrition for  lipid management  exercise and diabetes management   wt  loss   healthy choices while dining out  portion control  Plan: Education class: Reading Food Labels, Education Class: Heart Healthy Eating, replace butter with soft spreads made with olive oil, canola or yogurt, replace unhealthy snacks with fruits & vegs, reduce portion sizes, reduce red meat 1x/wk, switch to skim or 1% milk, eat fewer desserts and sweets, avoid processed foods, remove salt shaker from table, eat more home cooked meals and eat out less often and drink more water  Readiness to change: Preparation:  (Getting ready to change)       PSYCHOSOCIAL ASSESSMENT AND PLAN    Emotional:  Depression assessment:  PHQ-9 = 1-4 = Minimal Depression            Anxiety measure:  INÉS-7 = 0-4  = Not anxious  Self-reported stress level:  3  Social support: Excellent    Goals:  Reduce perceived stress to 1-3/10, improved [facility] QOL < 27, Physical Fitness in [facility] Score < 3 and increased energy    Progression Toward Goals: Reviewed Pt goals and determined plan of care, Will continue to educate and progress as tolerated  Education: signs/sxs of depression, benefits of a positive support system, stress management techniques, depression and CAD and benefits of mental health counseling  Plan: Class: Stress and Your Health, Class: Relaxation, Refer to behavioral health/counseling, Practice relaxation techniques, Exercise and Enjoy a hobby  Readiness to change: Preparation:  (Getting ready to change)       OTHER CORE COMPONENTS     Tobacco:   Social History     Tobacco Use   Smoking Status Never Smoker   Smokeless Tobacco Never Used       Tobacco Use Intervention:   N/A:  Patient is a non-smoker     Anginal Symptoms:  None   NTG use: No prescription    Blood pressure:    Restin/74   Exercise: 132/90    Goals: consistent BP < 130/80, reduced dietary sodium <2300mg, moderate intensity exercise >150 mins/wk and medication compliance    Progression Toward Goals: Reviewed Pt goals and determined plan of care, Will continue to educate and progress as tolerated      Education:  understanding high blood pressure and it's relationship to CAD, low sodium diet and HTN and proper use of sublingual NTG  Plan: Class: Understanding Heart Disease, Class: Common Heart Medications, eliminate salt shaker at the table, use salt substitutes, check labels for sodium content and monitor home BP  Readiness to change: Preparation:  (Getting ready to change)

## 2021-08-09 NOTE — PROGRESS NOTES
CARDIAC REHAB ASSESSMENT    Today's date: 2021  Patient name: Lucie Jacques     : 1945       MRN: [id number]  PCP: No primary care provider on file  Referring Physician: Fifi Olvera MD  Cardiologist: Cherie Howard   Surgeon: Rocky [facility]    Dx: No diagnosis found      Date of onset: 2021  Cultural needs: None     Height:    Wt Readings from Last 1 Encounters:   21 61 2 kg (135 lb)      Weight:   Ht Readings from Last 1 Encounters:   21 4' 9" (1 448 m)     Medical History:   Past Medical History:   Diagnosis Date    Arthritis     Cellulitis of face 10/16/2019    Bilateral nares    Family history of colon cancer in mother     Functional murmur     Hypercalcemia 2016    Hypertension     Dx'd in , controlled     Osteoporosis     Screening for breast cancer 2018    SOB (shortness of breath) 2020    Viral upper respiratory tract infection 10/30/2018         Physical Limitations: None     Fall Risk: Low   Comments: Ambulates with a steady gait with no assist device    Anginal Equivalent: None/denies angina   NTG use: No prescription    Risk Factors   Cholesterol: No  Smoking: Never used  HTN: Yes  DM: No  Obesity: No   Inactivity: Yes  Stress:  perceived  stress: 3/10   Stressors: Still adjusting to intermediate    Goals for Stress Management:Practice Relaxation Techniques, Read, Exercise and Enjoy a hobby    Family History:  Family History   Problem Relation Age of Onset    Colon cancer Mother 68    No Known Problems Father     No Known Problems Sister     No Known Problems Daughter     No Known Problems Maternal Grandmother     No Known Problems Maternal Grandfather     No Known Problems Paternal Grandmother     No Known Problems Paternal Grandfather     No Known Problems Son     No Known Problems Son     No Known Problems Paternal Aunt     No Known Problems Paternal Aunt     No Known Problems Paternal Aunt     No Known Problems Paternal Aunt Allergies: Flonase [fluticasone]  ETOH:   Social History     Substance and Sexual Activity   Alcohol Use Yes    Alcohol/week: 14 0 standard drinks    Types: 14 Glasses of wine per week    Comment: Social, 1 drink per week on average          Current Medications:   Current Outpatient Medications   Medication Sig Dispense Refill    alendronate (Fosamax) 70 mg tablet Take 1 tablet (70 mg total) by mouth every 7 days 12 tablet 3    Ascorbic Acid (VITAMIN C PO) Take 2 tablets by mouth daily       losartan-hydrochlorothiazide (HYZAAR) 50-12 5 mg per tablet Take 1 tablet by mouth daily 90 tablet 1    Omega-3 Fatty Acids (FISH OIL PO) Take 1 capsule by mouth daily       Red Yeast Rice Extract (RED YEAST RICE PO) Take 1 tablet by mouth daily       TURMERIC PO Take 1 tablet by mouth daily        No current facility-administered medications for this visit  Functional Status Prior to Diagnosis for Treatment   Occupation: retired  Recreation: None  ADLs: No limitations  [place]: able to perform self-care resumed driving  Exercise: 1 mile daily   Other: None     Current Functional Status  Occupation: retired  Recreation: None   ADLs:resumed all ADLs within sternal precautions  [place]: able to perform self-care resumed driving  Exercise: 1 mile daily   Other: None     Patient Specific Goals:  Increase Strength/endurance, increase confidence, decrease fatigue, return to normal ADLs     Short Term Program Goals: dietary modifications increased strength improved energy/stamina with ADLs exercise 120-150 mins/wk    Long Term Goals: increased maximal walking duration  increased intial training workload  Improved [facility] Activity Status score  Improved functional capacity  Improved Quality of Life - [facility] score reduced  Improved lipid profile  Reduced body fat%  Reduced waist circumference  Reduced stress  improved Rate Your Plate Score    Ability to reach goals/rehabilitation potential: Excellent    Projected return to function: 12 weeks  Objective tests: sub-max TM ETT      Nutritional   Reviewed details of Rate your Plate  Discussed key elements of heart healthy eating  Reviewed patient goals for dietary modifications and their clinical implications  Reviewed most recent lipid profile  Goals for dietary modification: choose lean cuts of meat  poultry without the skin  low fat ground meat and poultry  eliminate processed meats  reduce portions of meat to 3 oz  increase fish intake  more meatless meals  low fat dairy   reduced fat cheese  increase whole grains  increase fruits and vegetables  eliminate butter  low sodium  improved snack choices  more nuts/seeds  reduce sweets/frozen desserts  heathier choices while dining out      Emotional/Social  Patient reports he/she is coping well with good social support and denies depression or anxiety    Marital status:     Domestic Violence Screening: No    Comments: SP MVR with [facility] - 6/17/2021, S/S dizziness - not as common now   Still having leg swelling

## 2021-08-12 ENCOUNTER — CLINICAL SUPPORT (OUTPATIENT)
Dept: CARDIAC REHAB | Facility: CLINIC | Age: 76
End: 2021-08-12
Payer: COMMERCIAL

## 2021-08-12 DIAGNOSIS — Z98.890 S/P MVR (MITRAL VALVE REPAIR): ICD-10-CM

## 2021-08-12 PROCEDURE — 93798 PHYS/QHP OP CAR RHAB W/ECG: CPT

## 2021-08-13 ENCOUNTER — CLINICAL SUPPORT (OUTPATIENT)
Dept: CARDIAC REHAB | Facility: CLINIC | Age: 76
End: 2021-08-13
Payer: COMMERCIAL

## 2021-08-13 DIAGNOSIS — Z98.890 S/P MVR (MITRAL VALVE REPAIR): ICD-10-CM

## 2021-08-13 PROCEDURE — 93798 PHYS/QHP OP CAR RHAB W/ECG: CPT

## 2021-08-17 ENCOUNTER — CLINICAL SUPPORT (OUTPATIENT)
Dept: CARDIAC REHAB | Facility: CLINIC | Age: 76
End: 2021-08-17
Payer: COMMERCIAL

## 2021-08-17 DIAGNOSIS — Z98.890 S/P MVR (MITRAL VALVE REPAIR): ICD-10-CM

## 2021-08-17 PROCEDURE — 93798 PHYS/QHP OP CAR RHAB W/ECG: CPT

## 2021-08-19 ENCOUNTER — CLINICAL SUPPORT (OUTPATIENT)
Dept: CARDIAC REHAB | Facility: CLINIC | Age: 76
End: 2021-08-19
Payer: COMMERCIAL

## 2021-08-19 DIAGNOSIS — Z98.890 S/P MVR (MITRAL VALVE REPAIR): ICD-10-CM

## 2021-08-19 PROCEDURE — 93798 PHYS/QHP OP CAR RHAB W/ECG: CPT

## 2021-08-20 ENCOUNTER — CLINICAL SUPPORT (OUTPATIENT)
Dept: CARDIAC REHAB | Facility: CLINIC | Age: 76
End: 2021-08-20
Payer: COMMERCIAL

## 2021-08-20 DIAGNOSIS — Z98.890 S/P MVR (MITRAL VALVE REPAIR): ICD-10-CM

## 2021-08-20 PROCEDURE — 93798 PHYS/QHP OP CAR RHAB W/ECG: CPT

## 2021-08-24 ENCOUNTER — CLINICAL SUPPORT (OUTPATIENT)
Dept: CARDIAC REHAB | Facility: CLINIC | Age: 76
End: 2021-08-24
Payer: COMMERCIAL

## 2021-08-24 DIAGNOSIS — Z98.890 S/P MVR (MITRAL VALVE REPAIR): ICD-10-CM

## 2021-08-24 PROCEDURE — 93798 PHYS/QHP OP CAR RHAB W/ECG: CPT

## 2021-08-26 ENCOUNTER — APPOINTMENT (OUTPATIENT)
Dept: CARDIAC REHAB | Facility: CLINIC | Age: 76
End: 2021-08-26
Payer: COMMERCIAL

## 2021-08-27 ENCOUNTER — CLINICAL SUPPORT (OUTPATIENT)
Dept: CARDIAC REHAB | Facility: CLINIC | Age: 76
End: 2021-08-27
Payer: COMMERCIAL

## 2021-08-27 DIAGNOSIS — Z98.890 S/P MVR (MITRAL VALVE REPAIR): ICD-10-CM

## 2021-08-27 PROCEDURE — 93798 PHYS/QHP OP CAR RHAB W/ECG: CPT

## 2021-08-31 ENCOUNTER — APPOINTMENT (OUTPATIENT)
Dept: CARDIAC REHAB | Facility: CLINIC | Age: 76
End: 2021-08-31
Payer: COMMERCIAL

## 2021-09-07 NOTE — PROGRESS NOTES
Cardiac Rehabilitation Plan of Care   30 Day Reassessment          Today's date: 2021   # of Exercise Sessions Completed: 8  Patient name: Shaan Steel      : 1945  Age: 78 y o  MRN: [id number]  Referring Physician: Jaqueline Zavaleta MD  Cardiologist: Azael Burns MD  Provider: [place]  Clinician: Yuval Julio, MS, CEP, EPC    Dx:   Encounter Diagnosis   Name Primary?  S/P MVR (mitral valve repair)      Date of onset: 2021      SUMMARY OF PROGRESS:  Mariama is compliant attending cardiac rehab exercise sessions 3x/wk  She tolerates 35 mins at 2 2 - 3 5 METs  Resting BP  118/84 - 142/86 with appropriate response to exercise reaching 130/70- 150/82  NSR on tele observed  RHR 81 - 94 ExHR 102 - 110  She is tolerating progression of intensity levels to maintain RPE 4-6  No cardiac complaints  She is not progressing toward wt loss goals with a gain of 2 pounds  Patient has been working on  dietary modifications with the goal of rare red/processed meats, low fat dairy, reduced added sugars and refined flours  The patient is a non-smoker  Depression and anxiety screening were not reassessed  When addressed, the patient denies   depression/anxiety  Patient reports excellent social/emotional support  Patient attends group educational classes on cardiac risk factor modification  She has added home exercise 5-6 days/wk which includes walking 20-30 mins  Her exercise program will be progressed as tolerated to maintain RPE 4-6  The patient has the following personal goals he hopes to achieved by discharge: increased strength  Pt will continue to be educated on lifestyle modifications and encouraged to supplement with a home exercise program to reach the following goals in the next 30 days: add weight training        Medication compliance: Yes   Comments: Pt reports to be compliant with medications  Fall Risk: Low   Comments: Ambulates with a steady gait with no assist device    EKG Interpretation: NSR      EXERCISE ASSESSMENT and PLAN    Current Exercise Program in Rehab:       Frequency: 3 days/week Supplement with home exercise 2+ days/wk as tolerated       Minutes: 35         METS: 2 2- 3 5            HR:    RPE: 4-6         Modalities: UBE, NuStep and Recumbent bike      Exercise Progression 30 Day Goals :    Frequency: 3 days/week of cardiac rehab     Supplement with home exercise 2+ days/wk as tolerated    Minutes: 35-40                            >150 mins/wk of moderate intensity exercise   METS: 2 5-4 0   HR:     RPE: 4-6   Modalities: Treadmill, UBE, NuStep and Recumbent bike    Strength trainin-3 days / week  12-15 repetitions  1-2 sets per modality    Modalities: Leg Press, Chest Press, Pull Downs, Arm Curl and Seated Row    Home Exercise: Type: walking , Frequency: 5-6 days/week, Duration: 20-30 mins, Distance 1 mile    Goals: 10% improvement in functional capacity - based on max METs achieved in fitness assessment, Reduced dyspnea with physical activity  0-1/10, improved DASI score by 10%, Increase in exercise capacity by 40% - based on peak METs tolerated in cardiac rehab exercise session, Exercise 5 days/wk, >150mins/wk of moderate intensity exercise, Attend Rehab regularly and start a home exercise program    Progression Toward Goals:  Patient will add weight lifting in cardiac rehab in the next 30 days, Will continue to educate and progress as tolerated      Education: benefit of exercise for CAD risk factors, home exercise guidelines, AHA guidelines to achieve >150 mins/wk of moderate exercise, RPE scale and physical activity/exercise in extreme weather conditions   Plan:education on home exercise guidelines and home exercise 30+ mins 2 days opposite CR  Readiness to change: Action:  (Changing behavior)      NUTRITION ASSESSMENT AND PLAN    Weight control:    Starting weight: 138 6 lbs    Current weight:   140 lbs  Waist circumference:    Startin 5 in Current:      Diabetes: N/A  A1c: 5 0     last measured: 6/12/021    Lipid management: Discussed diet and lipid management and Last lipid profile 12/4/2020  Chol 224  TRG 62        Goals:reduced BMI to < 25, LDL <100, CHOL <200, decreased body fat % <33%  (F), reduced waist circumference <35 inches (F), Improved Rate Your Plate score  >01, 5 4-5%  wt loss, choose lean meat (93-95%), eliminate processed meats, cook without added fat or use vegetable oil/spray, eat 3 or more servings of whole grains a day, Eat 4-5 cups of fruits and vegetables daily, use olive or canola oil in baking, choose low sodium processed foods, eliminate butter, use fat-free dressings/canales or seldom use, seldom eat or choose low fat ice-cream, fruit juice bars or frozen yogurt , eliminate or choose low-fat sweets and daily saturated fat intake <7%/13g    Progression Toward Goals: Will continue to educate and progress as tolerated      Education: heart healthy eating  low sodium diet  hydration  nutrition for  lipid management  exercise and diabetes management   wt  loss   healthy choices while dining out  portion control  Plan: Education class: Reading Food Labels, Education Class: Heart Healthy Eating, replace butter with soft spreads made with olive oil, canola or yogurt, replace unhealthy snacks with fruits & vegs, reduce portion sizes, reduce red meat 1x/wk, switch to skim or 1% milk, eat fewer desserts and sweets, avoid processed foods, remove salt shaker from table, eat more home cooked meals and eat out less often and drink more water  Readiness to change: Action:  (Changing behavior)      PSYCHOSOCIAL ASSESSMENT AND PLAN    Emotional:  Depression assessment:  PHQ-9 = 1-4 = Minimal Depression            Anxiety measure:  INÉS-7 = 0-4  = Not anxious  Self-reported stress level:  3  Social support: Excellent    Goals:  Reduce perceived stress to 1-3/10, improved [facility] QOL < 27, Physical Fitness in [facility] Score < 3 and increased energy    Progression Toward Goals: Will continue to educate and progress as tolerated  Education: signs/sxs of depression, benefits of a positive support system, stress management techniques, depression and CAD and benefits of mental health counseling  Plan: Class: Stress and Your Health, Class: Relaxation, Refer to behavioral health/counseling, Practice relaxation techniques, Exercise and Enjoy a hobby  Readiness to change: Action:  (Changing behavior)      OTHER CORE COMPONENTS     Tobacco:   Social History     Tobacco Use   Smoking Status Never Smoker   Smokeless Tobacco Never Used       Tobacco Use Intervention:   N/A:  Patient is a non-smoker     Anginal Symptoms:  None   NTG use: No prescription    Blood pressure:    Restin/84- 142/86   Exercise: 130/70- 150/82    Goals: consistent BP < 130/80, reduced dietary sodium <2300mg, moderate intensity exercise >150 mins/wk and medication compliance    Progression Toward Goals: Patient will read food labels for sodium  in the next 30 days, Will continue to educate and progress as tolerated      Education:  understanding high blood pressure and it's relationship to CAD, low sodium diet and HTN and proper use of sublingual NTG  Plan: Class: Understanding Heart Disease, Class: Common Heart Medications, eliminate salt shaker at the table, use salt substitutes, check labels for sodium content and monitor home BP  Readiness to change: Action:  (Changing behavior)

## 2021-10-05 NOTE — PROGRESS NOTES
[place]      Dear Dr Go Dears will be placed on a Hold at this time due to family problems that have arisen  She last attended cardiac rehab on 8/27/2021 and has not returned since  She plans on returning to the program as soon as possible  Please contact us at [phone] if you have any questions about this patents case or if/when it is appropriate to re-start the patients rehab program at a later date  Thank you for your continued support of cardiac rehabilitation         Sincerely,  Mena Santiago MS, CEP

## 2021-10-26 ENCOUNTER — TELEPHONE (OUTPATIENT)
Dept: CARDIAC REHAB | Facility: CLINIC | Age: 76
End: 2021-10-26

## 2021-10-26 NOTE — PROGRESS NOTES
[place]      Dear Dr Genny Kay,    Thank you for referring your patient to our cardiac  rehabilitation program  The patient has completed 8  visits  However, rehab is being discontinued at this time due to:    Voluntary Dropout:  The patient has chosen to quit due to :  Home commitments and ongoing medical issues  Please contact us at [phone] if you have any questions about this patents case  Thank you for your continued support of cardiac rehabilitation         Sincerely,      Talya Funk, MS, CEP, EPC  Cardiopulmonary Rehab

## 2021-11-02 PROBLEM — I34.1 MITRAL VALVE PROLAPSE: Status: ACTIVE | Noted: 2021-11-02

## 2021-11-03 ENCOUNTER — OFFICE VISIT (OUTPATIENT)
Dept: CARDIOLOGY CLINIC | Facility: CLINIC | Age: 76
End: 2021-11-03
Payer: COMMERCIAL

## 2021-11-03 VITALS
HEART RATE: 84 BPM | WEIGHT: 140.4 LBS | OXYGEN SATURATION: 98 % | SYSTOLIC BLOOD PRESSURE: 178 MMHG | DIASTOLIC BLOOD PRESSURE: 82 MMHG | HEIGHT: 57 IN | BODY MASS INDEX: 30.29 KG/M2

## 2021-11-03 DIAGNOSIS — I10 BENIGN ESSENTIAL HYPERTENSION: ICD-10-CM

## 2021-11-03 DIAGNOSIS — I34.1 MITRAL VALVE PROLAPSE: Primary | ICD-10-CM

## 2021-11-03 DIAGNOSIS — E78.5 DYSLIPIDEMIA: ICD-10-CM

## 2021-11-03 DIAGNOSIS — Z98.890 S/P MVR (MITRAL VALVE REPAIR): ICD-10-CM

## 2021-11-03 PROCEDURE — 3008F BODY MASS INDEX DOCD: CPT | Performed by: INTERNAL MEDICINE

## 2021-11-03 PROCEDURE — 3079F DIAST BP 80-89 MM HG: CPT | Performed by: INTERNAL MEDICINE

## 2021-11-03 PROCEDURE — 3077F SYST BP >= 140 MM HG: CPT | Performed by: INTERNAL MEDICINE

## 2021-11-03 PROCEDURE — 93000 ELECTROCARDIOGRAM COMPLETE: CPT | Performed by: INTERNAL MEDICINE

## 2021-11-03 PROCEDURE — 1160F RVW MEDS BY RX/DR IN RCRD: CPT | Performed by: INTERNAL MEDICINE

## 2021-11-03 PROCEDURE — 99204 OFFICE O/P NEW MOD 45 MIN: CPT | Performed by: INTERNAL MEDICINE

## 2021-11-03 RX ORDER — PHENOL 1.4 %
600 AEROSOL, SPRAY (ML) MUCOUS MEMBRANE 2 TIMES DAILY WITH MEALS
COMMUNITY

## 2021-11-03 RX ORDER — MELATONIN
1000 DAILY
COMMUNITY

## 2021-11-03 RX ORDER — MULTIVIT WITH MINERALS/LUTEIN
1000 TABLET ORAL DAILY
COMMUNITY

## 2021-11-03 RX ORDER — ASPIRIN 81 MG/1
81 TABLET ORAL DAILY
COMMUNITY

## 2021-11-03 RX ORDER — ATORVASTATIN CALCIUM 20 MG/1
20 TABLET, FILM COATED ORAL DAILY
Qty: 90 TABLET | Refills: 3 | Status: SHIPPED | OUTPATIENT
Start: 2021-11-03

## 2021-11-03 RX ORDER — DIPHENOXYLATE HYDROCHLORIDE AND ATROPINE SULFATE 2.5; .025 MG/1; MG/1
1 TABLET ORAL DAILY
COMMUNITY

## 2021-11-03 RX ORDER — METOPROLOL SUCCINATE 50 MG/1
50 TABLET, EXTENDED RELEASE ORAL DAILY
COMMUNITY

## 2021-11-03 RX ORDER — SENNA PLUS 8.6 MG/1
1 TABLET ORAL DAILY
COMMUNITY

## 2021-11-03 RX ORDER — DOCUSATE SODIUM 100 MG/1
100 CAPSULE, LIQUID FILLED ORAL 2 TIMES DAILY
COMMUNITY

## 2021-11-03 RX ORDER — LOSARTAN POTASSIUM 25 MG/1
25 TABLET ORAL DAILY
Qty: 90 TABLET | Refills: 3 | Status: SHIPPED | OUTPATIENT
Start: 2021-11-03 | End: 2021-11-23 | Stop reason: SDUPTHER

## 2021-12-15 ENCOUNTER — OFFICE VISIT (OUTPATIENT)
Dept: AUDIOLOGY | Age: 76
End: 2021-12-15
Payer: COMMERCIAL

## 2021-12-15 DIAGNOSIS — R42 DIZZINESS AND GIDDINESS: Primary | ICD-10-CM

## 2021-12-15 PROCEDURE — 92540 BASIC VESTIBULAR EVALUATION: CPT | Performed by: AUDIOLOGIST-HEARING AID FITTER

## 2021-12-15 PROCEDURE — 92537 CALORIC VSTBLR TEST W/REC: CPT | Performed by: AUDIOLOGIST-HEARING AID FITTER

## 2021-12-15 PROCEDURE — 92567 TYMPANOMETRY: CPT | Performed by: AUDIOLOGIST-HEARING AID FITTER

## 2022-06-14 ENCOUNTER — APPOINTMENT (OUTPATIENT)
Dept: LAB | Facility: CLINIC | Age: 77
End: 2022-06-14
Payer: COMMERCIAL

## 2022-06-14 DIAGNOSIS — E78.5 DYSLIPIDEMIA: ICD-10-CM

## 2022-06-14 LAB
ALBUMIN SERPL BCP-MCNC: 3.8 G/DL (ref 3.5–5)
ALP SERPL-CCNC: 69 U/L (ref 34–104)
ALT SERPL W P-5'-P-CCNC: 19 U/L (ref 7–52)
ANION GAP SERPL CALCULATED.3IONS-SCNC: 12 MMOL/L (ref 4–13)
AST SERPL W P-5'-P-CCNC: 18 U/L (ref 13–39)
BILIRUB SERPL-MCNC: 0.7 MG/DL (ref 0.2–1)
BUN SERPL-MCNC: 28 MG/DL (ref 5–25)
CALCIUM SERPL-MCNC: 10.9 MG/DL (ref 8.4–10.2)
CHLORIDE SERPL-SCNC: 110 MMOL/L (ref 96–108)
CHOLEST SERPL-MCNC: 182 MG/DL
CO2 SERPL-SCNC: 23 MMOL/L (ref 21–32)
CREAT SERPL-MCNC: 1.04 MG/DL (ref 0.6–1.3)
GFR SERPL CREATININE-BSD FRML MDRD: 52 ML/MIN/1.73SQ M
GLUCOSE P FAST SERPL-MCNC: 99 MG/DL (ref 65–99)
HDLC SERPL-MCNC: 79 MG/DL
LDLC SERPL CALC-MCNC: 83 MG/DL (ref 0–100)
POTASSIUM SERPL-SCNC: 3.8 MMOL/L (ref 3.5–5.3)
PROT SERPL-MCNC: 6.6 G/DL (ref 6.4–8.4)
SODIUM SERPL-SCNC: 145 MMOL/L (ref 135–147)
TRIGL SERPL-MCNC: 100 MG/DL

## 2022-06-14 PROCEDURE — 80053 COMPREHEN METABOLIC PANEL: CPT | Performed by: INTERNAL MEDICINE

## 2022-06-14 PROCEDURE — 80061 LIPID PANEL: CPT

## 2022-06-14 PROCEDURE — 36415 COLL VENOUS BLD VENIPUNCTURE: CPT | Performed by: INTERNAL MEDICINE

## 2022-06-16 ENCOUNTER — OFFICE VISIT (OUTPATIENT)
Dept: CARDIOLOGY CLINIC | Facility: CLINIC | Age: 77
End: 2022-06-16
Payer: COMMERCIAL

## 2022-06-16 VITALS
HEIGHT: 57 IN | HEART RATE: 81 BPM | BODY MASS INDEX: 31.24 KG/M2 | WEIGHT: 144.8 LBS | DIASTOLIC BLOOD PRESSURE: 78 MMHG | OXYGEN SATURATION: 97 % | SYSTOLIC BLOOD PRESSURE: 116 MMHG

## 2022-06-16 DIAGNOSIS — I34.1 MITRAL VALVE PROLAPSE: Primary | ICD-10-CM

## 2022-06-16 DIAGNOSIS — E78.5 DYSLIPIDEMIA: ICD-10-CM

## 2022-06-16 DIAGNOSIS — Z98.890 S/P MVR (MITRAL VALVE REPAIR): ICD-10-CM

## 2022-06-16 DIAGNOSIS — I10 BENIGN ESSENTIAL HYPERTENSION: ICD-10-CM

## 2022-06-16 PROCEDURE — 3078F DIAST BP <80 MM HG: CPT | Performed by: INTERNAL MEDICINE

## 2022-06-16 PROCEDURE — 1160F RVW MEDS BY RX/DR IN RCRD: CPT | Performed by: INTERNAL MEDICINE

## 2022-06-16 PROCEDURE — 1036F TOBACCO NON-USER: CPT | Performed by: INTERNAL MEDICINE

## 2022-06-16 PROCEDURE — 99214 OFFICE O/P EST MOD 30 MIN: CPT | Performed by: INTERNAL MEDICINE

## 2022-06-16 PROCEDURE — 3008F BODY MASS INDEX DOCD: CPT | Performed by: INTERNAL MEDICINE

## 2022-06-16 PROCEDURE — 93000 ELECTROCARDIOGRAM COMPLETE: CPT | Performed by: INTERNAL MEDICINE

## 2022-06-16 PROCEDURE — 3074F SYST BP LT 130 MM HG: CPT | Performed by: INTERNAL MEDICINE

## 2022-06-16 RX ORDER — CHLORTHALIDONE 25 MG/1
25 TABLET ORAL DAILY
COMMUNITY

## 2022-06-16 RX ORDER — AMOXICILLIN 500 MG/1
500 CAPSULE ORAL EVERY 8 HOURS SCHEDULED
COMMUNITY

## 2022-06-16 NOTE — PROGRESS NOTES
Cardiology Follow-up    Rachelle Espinal  [id number]  1945  [facility] [place]  [address]  [place] [address] [facility] [address]      1  Mitral valve prolapse  POCT ECG   2  S/P MVR (mitral valve repair)     3  Benign essential hypertension     4  Dyslipidemia         Discussion/Summary:  Mrs Lani Espinal is a pleasant 73-year-old female who presents to the office today for routine follow-up  Since her last visit with me she has noted some shortness of breath with exertion  She underwent a heart catheterization less than a year ago revealing no obstructive coronary disease  She does not appear volume overloaded in the office today  She is scheduled to undergo an echocardiogram per her cardiac surgeon and I will await the results  Her blood pressure is well controlled in the office today on her current antihypertensive medication regimen to which no changes were advised  She was started on statin therapy after her last visit  Her most recent lipids reveal acceptable numbers  She was reminded of the need for antibiotics prior to the dentist     I will see her back in the office in six months or sooner if deemed necessary  History of Present Illness:  Mrs Lani Espinal is a pleasant 73-year-old female who presents to the office today for routine follow-up  Since her last visit with me she has been feeling relatively well  Her lightheadedness has markedly improved  She notes this occasionally when she stands  She denies any syncope or presyncope  Over the past 6 to 8 months she has noted some shortness of breath with exertion with activity such as ascending a flight of steps  She denies any exertional chest pain  She does report lower extremity edema for which her primary care provider placed her on chlorthalidone  Her blood pressure was also elevated  She notes improvement    She denies any paroxysmal nocturnal dyspnea, orthopnea, acute weight gain or increasing abdominal girth  She denies palpitations or symptoms of claudication      Patient Active Problem List   Diagnosis    Benign essential hypertension    Vitamin D insufficiency    Dyslipidemia    Other osteoporosis without current pathological fracture    Seasonal allergies    Elevated parathyroid hormone    Varicose veins of left lower extremity with pain    Bilateral lower extremity edema    S/P MVR (mitral valve repair)    Mitral valve prolapse     Past Medical History:   Diagnosis Date    Arthritis     Cellulitis of face 10/16/2019    Bilateral nares    Family history of colon cancer in mother     Functional murmur     Hypercalcemia 11/21/2016    Hypertension     Dx'd in 2003, controlled     Osteoporosis     Screening for breast cancer 12/5/2018    SOB (shortness of breath) 7/31/2020    Viral upper respiratory tract infection 10/30/2018     Social History     Socioeconomic History    Marital status: /Civil Union     Spouse name: Not on file    Number of children: Not on file    Years of education: Not on file    Highest education level: Not on file   Occupational History    Not on file   Tobacco Use    Smoking status: Never Smoker    Smokeless tobacco: Never Used   Vaping Use    Vaping Use: Never used   Substance and Sexual Activity    Alcohol use: Yes     Comment: Rarely     Drug use: No    Sexual activity: Not Currently   Other Topics Concern    Not on file   Social History Narrative    Caffeine use via coffee, 1 serv/day    Exercises regularly, walking      Social Determinants of Health     Financial Resource Strain: Not on file   Food Insecurity: Not on file   Transportation Needs: Not on file   Physical Activity: Not on file   Stress: Not on file   Social Connections: Not on file   Intimate Partner Violence: Not on file   Housing Stability: Not on file      Family History   Problem Relation Age of Onset    Colon cancer Mother 68    No Known Problems Father     No Known Problems Sister     No Known Problems Daughter     No Known Problems Maternal Grandmother     No Known Problems Maternal Grandfather     No Known Problems Paternal Grandmother     No Known Problems Paternal Grandfather     No Known Problems Son     No Known Problems Son     No Known Problems Paternal Aunt     No Known Problems Paternal Aunt     No Known Problems Paternal Aunt     No Known Problems Paternal Aunt      Past Surgical History:   Procedure Laterality Date     SECTION      x2, 1972 &     FLEXIBLE SIGMOIDOSCOPY      MITRAL VALVE REPAIR      NC COLONOSCOPY FLX DX W/COLLJ [facility] WHEN PFRMD N/A 10/10/2016    Procedure: COLONOSCOPY;  Surgeon: Andrés Craft MD;  [facility];   Service: Gastroenterology    TOE SURGERY Bilateral     hammer toe correction surgery    TONSILLECTOMY      with Adenoidectomy       Current Outpatient Medications:     alendronate (Fosamax) 70 mg tablet, Take 1 tablet (70 mg total) by mouth every 7 days, Disp: 12 tablet, Rfl: 3    amoxicillin (AMOXIL) 500 mg capsule, Take 500 mg by mouth every 8 (eight) hours, Disp: , Rfl:     Ascorbic Acid (vitamin C) 1000 MG tablet, Take 1,000 mg by mouth daily, Disp: , Rfl:     aspirin (ECOTRIN LOW STRENGTH) 81 mg EC tablet, Take 81 mg by mouth daily, Disp: , Rfl:     atorvastatin (LIPITOR) 20 mg tablet, Take 1 tablet (20 mg total) by mouth daily, Disp: 90 tablet, Rfl: 3    chlorthalidone 25 mg tablet, Take 25 mg by mouth daily Take 0 5mg daily, Disp: , Rfl:     cholecalciferol (VITAMIN D3) 1,000 units tablet, Take 1,000 Units by mouth daily, Disp: , Rfl:     docusate sodium (COLACE) 100 mg capsule, Take 100 mg by mouth 2 (two) times a day, Disp: , Rfl:     losartan (COZAAR) 50 mg tablet, Take 1 tablet (50 mg total) by mouth daily, Disp: 90 tablet, Rfl: 3    metoprolol succinate (TOPROL-XL) 50 mg 24 hr tablet, Take 50 mg by mouth daily, Disp: , Rfl:     multivitamin (THERAGRAN) TABS, Take 1 tablet by mouth daily, Disp: , Rfl:     senna (SENOKOT) 8 6 MG tablet, Take 1 tablet by mouth daily, Disp: , Rfl:     Ascorbic Acid (VITAMIN C PO), Take 2 tablets by mouth daily  (Patient not taking: Reported on 6/16/2022), Disp: , Rfl:     calcium carbonate (OS-GEM) 600 MG tablet, Take 600 mg by mouth 2 (two) times a day with meals (Patient not taking: Reported on 6/16/2022), Disp: , Rfl:     losartan-hydrochlorothiazide (HYZAAR) 50-12 5 mg per tablet, Take 1 tablet by mouth daily (Patient not taking: No sig reported), Disp: 90 tablet, Rfl: 1    Omega-3 Fatty Acids (FISH OIL PO), Take 1 capsule by mouth daily  (Patient not taking: No sig reported), Disp: , Rfl:     Red Yeast Rice Extract (RED YEAST RICE PO), Take 1 tablet by mouth daily  (Patient not taking: No sig reported), Disp: , Rfl:     TURMERIC PO, Take 1 tablet by mouth daily  (Patient not taking: No sig reported), Disp: , Rfl:   Allergies   Allergen Reactions    Flonase [Fluticasone] Dermatitis         Imaging: No results found  ECG:  Normal sinus rhythm, poor anterior R-wave progression    Review of Systems:  Review of Systems   Respiratory: Positive for shortness of breath  Negative for cough and choking  Cardiovascular: Positive for leg swelling  Negative for chest pain  Musculoskeletal: Positive for arthralgias  All other systems reviewed and are negative          Vitals:    06/16/22 1306   BP: 116/78   BP Location: Left arm   Patient Position: Sitting   Cuff Size: Adult   Pulse: 81   SpO2: 97%   Weight: 65 7 kg (144 lb 12 8 oz)   Height: 4' 9" (1 448 m)     Vitals:    06/16/22 1306   Weight: 65 7 kg (144 lb 12 8 oz)     Height: 4' 9" (144 8 cm)     Physical Exam:  General:  Alert and cooperative, appears stated age  HEENT:  PERRLA, EOMI, no scleral icterus, no conjunctival pallor  Neck:  No lymphadenopathy, no thyromegaly, no carotid bruits, no elevated JVP  Heart:  Regular rate and rhythm, normal S1/S2, no S3/S4, no murmur  Lungs:  Clear to auscultation bilaterally   Abdomen:  Soft, non-tender, positive bowel sounds, no rebound or guarding,   no organomegaly   Extremities:  No clubbing, cyanosis or edema   Vascular:  2+ pedal pulses  Skin:  No rashes or lesions on exposed skin  Neurologic:  Cranial nerves II-XII grossly intact without focal deficits

## 2022-06-28 ENCOUNTER — TELEPHONE (OUTPATIENT)
Dept: CARDIOLOGY CLINIC | Facility: CLINIC | Age: 77
End: 2022-06-28

## 2022-06-28 NOTE — TELEPHONE ENCOUNTER
Faxed request to [address] [place] [address] recorders # [phone] to get Echo( 6/27/22) images for Dr Ephraim Osuna

## 2022-10-29 DIAGNOSIS — E78.5 DYSLIPIDEMIA: ICD-10-CM

## 2022-10-31 RX ORDER — ATORVASTATIN CALCIUM 20 MG/1
20 TABLET, FILM COATED ORAL DAILY
Qty: 90 TABLET | Refills: 3 | Status: SHIPPED | OUTPATIENT
Start: 2022-10-31

## 2023-04-06 ENCOUNTER — OFFICE VISIT (OUTPATIENT)
Dept: FAMILY MEDICINE CLINIC | Facility: CLINIC | Age: 78
End: 2023-04-06

## 2023-04-06 VITALS
WEIGHT: 156 LBS | DIASTOLIC BLOOD PRESSURE: 82 MMHG | RESPIRATION RATE: 14 BRPM | OXYGEN SATURATION: 97 % | BODY MASS INDEX: 33.66 KG/M2 | HEIGHT: 57 IN | SYSTOLIC BLOOD PRESSURE: 120 MMHG | HEART RATE: 77 BPM | TEMPERATURE: 97.7 F

## 2023-04-06 DIAGNOSIS — I34.1 MITRAL VALVE PROLAPSE: ICD-10-CM

## 2023-04-06 DIAGNOSIS — E78.5 HYPERLIPIDEMIA ASSOCIATED WITH TYPE 2 DIABETES MELLITUS (HCC): Primary | ICD-10-CM

## 2023-04-06 DIAGNOSIS — R73.09 ELEVATED HEMOGLOBIN A1C: ICD-10-CM

## 2023-04-06 DIAGNOSIS — Z13.820 SCREENING FOR OSTEOPOROSIS: ICD-10-CM

## 2023-04-06 DIAGNOSIS — E11.69 HYPERLIPIDEMIA ASSOCIATED WITH TYPE 2 DIABETES MELLITUS (HCC): Primary | ICD-10-CM

## 2023-04-06 DIAGNOSIS — I10 BENIGN ESSENTIAL HYPERTENSION: ICD-10-CM

## 2023-04-06 DIAGNOSIS — K59.04 CHRONIC IDIOPATHIC CONSTIPATION: ICD-10-CM

## 2023-04-06 DIAGNOSIS — Z12.11 SCREEN FOR COLON CANCER: ICD-10-CM

## 2023-04-06 DIAGNOSIS — E83.52 HYPERCALCEMIA: ICD-10-CM

## 2023-04-06 DIAGNOSIS — Z98.890 S/P MVR (MITRAL VALVE REPAIR): ICD-10-CM

## 2023-04-06 DIAGNOSIS — M81.8 IDIOPATHIC OSTEOPOROSIS: ICD-10-CM

## 2023-04-06 DIAGNOSIS — E78.5 DYSLIPIDEMIA: ICD-10-CM

## 2023-04-06 DIAGNOSIS — Z12.31 ENCOUNTER FOR SCREENING MAMMOGRAM FOR BREAST CANCER: ICD-10-CM

## 2023-04-06 LAB
SL AMB  POCT GLUCOSE, UA: ABNORMAL
SL AMB LEUKOCYTE ESTERASE,UA: ABNORMAL
SL AMB POCT BILIRUBIN,UA: ABNORMAL
SL AMB POCT BLOOD,UA: ABNORMAL
SL AMB POCT CLARITY,UA: CLEAR
SL AMB POCT COLOR,UA: YELLOW
SL AMB POCT HEMOGLOBIN AIC: 5.6 (ref ?–6.5)
SL AMB POCT KETONES,UA: ABNORMAL
SL AMB POCT NITRITE,UA: ABNORMAL
SL AMB POCT PH,UA: 5
SL AMB POCT SPECIFIC GRAVITY,UA: 1.02
SL AMB POCT URINE PROTEIN: ABNORMAL
SL AMB POCT UROBILINOGEN: 0.2

## 2023-04-06 RX ORDER — BUPROPION HYDROCHLORIDE 75 MG/1
75 TABLET ORAL 2 TIMES DAILY
Qty: 30 TABLET | Refills: 0 | Status: SHIPPED | OUTPATIENT
Start: 2023-04-06

## 2023-04-06 RX ORDER — LOSARTAN POTASSIUM 50 MG/1
50 TABLET ORAL DAILY
Qty: 90 TABLET | Refills: 3 | Status: SHIPPED | OUTPATIENT
Start: 2023-04-06

## 2023-04-06 RX ORDER — ATORVASTATIN CALCIUM 20 MG/1
20 TABLET, FILM COATED ORAL DAILY
Qty: 90 TABLET | Refills: 3 | Status: SHIPPED | OUTPATIENT
Start: 2023-04-06

## 2023-04-06 RX ORDER — TORSEMIDE 5 MG/1
5 TABLET ORAL DAILY
Qty: 90 TABLET | Refills: 3 | Status: SHIPPED | OUTPATIENT
Start: 2023-04-06

## 2023-04-06 RX ORDER — BUPROPION HYDROCHLORIDE 150 MG/1
1 TABLET ORAL EVERY MORNING
COMMUNITY
Start: 2022-12-23 | End: 2023-04-06

## 2023-04-06 RX ORDER — ASPIRIN 81 MG/1
81 TABLET ORAL DAILY
Qty: 90 TABLET | Refills: 3 | Status: SHIPPED | OUTPATIENT
Start: 2023-04-06

## 2023-04-06 RX ORDER — METOPROLOL SUCCINATE 50 MG/1
50 TABLET, EXTENDED RELEASE ORAL DAILY
Qty: 90 TABLET | Refills: 3 | Status: SHIPPED | OUTPATIENT
Start: 2023-04-06

## 2023-04-06 NOTE — PROGRESS NOTES
BMI Counseling: Body mass index is 33 76 kg/m²  The BMI is above normal  Nutrition recommendations include reducing portion sizes  Pt presents with a mild-moderate pharyngeal dysphagia characterized by decreased bolus clearance efficiency resulting in pharyngeal residue laryngeal penetration. There was no aspiration during this exam.

## 2023-04-06 NOTE — PATIENT INSTRUCTIONS
Osteoporosis Education   Osteoporosis  is a long-term medical condition that causes your bones to become weak, brittle, and more likely to fracture  Osteoporosis occurs when your body absorbs more bone than it makes  It is also caused by a lack of calcium and estrogen (female hormone)  Common symptoms include the following: You may not have any signs or symptoms  You may break a bone after a muscle strain, bump, or fall  A break usually occurs in the hip, spine, or wrist  A collapsed vertebra (bone in your spine) may cause severe back pain or loss of height from bent posture  Call your doctor if:   ·  You have severe pain  ·  You have increasing pain after a fall  ·  You have pain when you do your daily activities  ·  You have questions or concerns about your condition or care  Diagnosis of osteoporosis:   · Blood and urine tests  measure your calcium, vitamin D, and estrogen levels  · An x-ray or CT may show thinned bones or a fracture  You may be given contrast liquid to help the bones show up better in the pictures  Tell the healthcare provider if you have ever had an allergic reaction to contrast liquid  Do not enter the MRI room with anything metal  Metal can cause serious injury  Tell the healthcare provider if you have any metal in or on your body  · A bone density test  compares your bone thickness with what is expected for someone of your age, gender, and ethnicity  Treatment for osteoporosis may include medicines to prevent bone loss, build new bone, and increase estrogen  These medicines help prevent fractures and may be given as a pill or injection  Ask your healthcare provider for more information on these medicines  Prevent bone loss:  · Eat healthy foods that are high in calcium  This helps keep your bones strong  Good sources of calcium are milk, cheese, broccoli, tofu, almonds, and canned salmon and sardines  Recommended to get at least 1200mg daily of calcium    · Increase your "vitamin D intake  Vitamin D is in fish oils, some vegetables, and fortified milk, cereal, and bread  Vitamin D is also formed in the skin when it is exposed to the sun  Ask your healthcare provider how much sunlight is safe for you  You will require at least 800 units of vitamin D daily taken as a supplement  · Drink liquids as directed  Ask your healthcare provider how much liquid to drink each day and which liquids are best for you  Do not have alcohol or caffeine  They decrease bone mineral density, which can weaken your bones  · Exercise regularly  Ask your healthcare provider about the best exercise plan for you  Weight bearing exercise for 30 minutes, 3 times a week can help build and strengthen bone  · Do not smoke  Nicotine and other chemicals in cigarettes and cigars can cause lung damage  Ask your healthcare provider for information if you currently smoke and need help to quit  E-cigarettes or smokeless tobacco still contain nicotine  Talk to your healthcare provider before you use these products  · Go to physical therapy as directed  A physical therapist teaches you exercises to help improve movement and muscle strength  · Alcohol  It is recommended to avoid heavy alcohol use as increased consumption of alcohol is known to cause bone loss  © Copyright [employer] 2021 Information is for End User's use only and may not be sold, redistributed or otherwise used for commercial purposes  All illustrations and images included in CareNotes® are the copyrighted property of [employer]  or [facility] Alan Sanabria    Calcium and vitamin D for bone health (Beyond the Basics)    CALCIUM AND VITAMIN D OVERVIEW  Osteoporosis is a common bone disorder that causes a progressive loss in bone density and mass  As a result, bones become thin, weakened, and easily fractured   It is estimated that more than 1 3 million osteoporosis-associated (or \"osteoporotic\") fractures occur every year in the [place], " primarily of bone within the spine (the vertebrae), the hip, and the forearm near the wrist  =    A number of treatments can help to prevent loss of bone and treat low bone mass  However, the first step in preventing or treating osteoporosis is to consume foods and drinks that provide calcium, a mineral essential for bone strength, and vitamin D, which aids in calcium breakdown and absorption  =    CALCIUM AND VITAMIN D BENEFITS  Good nutrition is important at all ages to keep the bones healthy  · Taking calcium reduces bone loss and decreases the risk of fracturing the vertebrae (the bones that surround the spinal cord)  · Consuming calcium during childhood (eg, in milk) can lead to higher bone mass in adulthood  This increase in bone density can reduce the risk of fractures later in life  · Calcium may also have benefits in other body systems by reducing blood pressure and cholesterol levels  · Calcium and vitamin D supplements may help prevent tooth loss in older adults  RECOMMENDATIONS FOR CALCIUM    General recommendations -- Premenopausal women and men should consume at least 1000 mg of calcium, while postmenopausal women should consume 1200 mg (total diet plus supplement)  You should not consume more than 2000 mg of calcium per day (total diet plus supplement) due to the risk of side effects  Calcium in the diet -- The primary sources of calcium in the diet include milk and other dairy products, such as hard cheese, cottage cheese, or yogurt, as well as green vegetables, such as kale and broccoli (table 1)  Some cereals, soy products, and fruit juices are fortified with calcium  Calcium supplements -- The body is able to absorb calcium contained in supplements as well as from dietary sources  If it is not possible to get enough calcium from dietary sources, consult a health care provider to determine the best type, dose, and timing of calcium supplements       · Calcium carbonate is effective and is the least expensive form of calcium  It is best absorbed with a low-iron meal (such as breakfast)  Calcium carbonate may not be absorbed well in people who also take a specific medication for gastroesophageal reflux (called a proton pump inhibitor or H2 blocker), which blocks stomach acid  · Many natural calcium carbonate preparations such as oyster shells or bone meal contain some lead  · Calcium citrate is well absorbed in the fasting state as well as with a meal   · Calcium doses above 500 mg are not absorbed as well as smaller doses, so large doses of supplements should be taken in divided doses (eg, in the morning and evening)  · Calcium supplements do not replace other osteoporosis treatments such as hormone replacement, bisphosphonates (eg, risedronate [sample brand name: Actonel] and alendronate [brand name: Fosamax]), and raloxifene (brand name: Evista)  Calcium and vitamin D supplements alone are usually insufficient to prevent age-related bone loss, although they may be beneficial in some subgroups (older adults, those with very low intake)  In most patients with or at risk for osteoporosis, the addition of medication or hormonal therapy is necessary in order to slow the breakdown and removal of bone (ie, resorption)  Underlying gastrointestinal diseases -- Patients who do not adequately absorb nutrients from the gastrointestinal tract (due to malabsorption) may require more than 1000 to 1200 mg of calcium per day  In such cases, a health care provider can help to determine the optimal dose of calcium  Medications -- All medications should be discussed with a health care provider to ensure that possible interactions with calcium are identified  Certain medications change the amount of calcium that is absorbed and/or excreted  As an example, loop diuretics (eg, furosemide [sample brand name: Lasix]) increase the amount of calcium excreted in the urine      On the other hand, thiazide diuretics (eg, hydrochlorothiazide) can reduce levels of calcium in the urine, potentially reducing the risk of bone loss and kidney stones  Side effects of calcium -- Calcium is usually easily tolerated when it is taken in divided doses several times per day  Some people experience side effects related to calcium, including constipation and indigestion  Calcium supplements interfere with the absorption of iron and thyroid hormone, and therefore, these medications should be taken at different times  Kidney stones -- There is no evidence that consuming large amounts of calcium (from foods and drinks) increases the risk of kidney stones, or that avoiding dietary calcium decreases the risk  In fact, avoiding dairy products is likely to increase the risk of kidney stones  However, use of calcium supplements may increase the risk of kidney stones in susceptible individuals by raising the level of calcium in the urine  This is particularly true if the supplement is taken between meals or at bedtime, and this is one of the reasons we prefer for patients to get as much of their calcium requirement through dietary means  IMPORTANCE OF VITAMIN D  Vitamin D decreases bone loss and lowers the risk of fracture, especially in older men and women  Along with calcium, vitamin D also helps to prevent and treat osteoporosis  To absorb calcium efficiently, an adequate amount of vitamin D must be present  Vitamin D is normally made in the skin after exposure to sunlight  Recommendations for vitamin D -- The current recommendation is that men over 70 years and postmenopausal women consume at least 800 international units (20 micrograms) of vitamin D per day  Lower levels of vitamin D are not as effective, while high doses can be toxic, especially if taken for long periods of time   Although the optimal intake has not been clearly established in premenopausal women or in younger men with osteoporosis, 600 international units (15 micrograms) of vitamin D daily is generally suggested  Vitamin D is available as an individual supplement and is included in most multivitamins and some calcium supplements (table 2)  Milk is a relatively good dietary source of vitamin D, with approximately 100 international units (2 5 micrograms) per cup (240 mL), and salmon has 800 to 1000 international units (20 to 25 micrograms) of vitamin D per serving  Most healthy people don't need to check with their health care provider before taking standard doses of vitamin D and don't need monitoring of vitamin D levels if they are not being treated for vitamin D deficiency  However, recommendations for vitamin D supplementation may be different in people with certain underlying medical conditions, such as sarcoidosis or lymphoma  In these situations, a provider will determine if supplements are needed; if so, the person's vitamin D and calcium levels should be monitored with regular tests  ©4825 [employer], [address] rights reserved

## 2023-04-24 DIAGNOSIS — E83.52 HYPERCALCEMIA: ICD-10-CM

## 2023-04-24 DIAGNOSIS — E04.1 THYROID NODULE: Primary | ICD-10-CM

## 2023-04-24 DIAGNOSIS — R79.89 ELEVATED PTHRP LEVEL: ICD-10-CM

## 2023-05-04 ENCOUNTER — APPOINTMENT (OUTPATIENT)
Dept: LAB | Facility: CLINIC | Age: 78
End: 2023-05-04

## 2023-05-04 DIAGNOSIS — Z98.890 S/P MVR (MITRAL VALVE REPAIR): ICD-10-CM

## 2023-05-04 DIAGNOSIS — I34.1 MITRAL VALVE PROLAPSE: ICD-10-CM

## 2023-05-04 LAB
ANION GAP SERPL CALCULATED.3IONS-SCNC: 5 MMOL/L (ref 4–13)
BUN SERPL-MCNC: 23 MG/DL (ref 5–25)
CALCIUM SERPL-MCNC: 9.8 MG/DL (ref 8.4–10.2)
CHLORIDE SERPL-SCNC: 111 MMOL/L (ref 96–108)
CO2 SERPL-SCNC: 25 MMOL/L (ref 21–32)
CREAT SERPL-MCNC: 1.03 MG/DL (ref 0.6–1.3)
GFR SERPL CREATININE-BSD FRML MDRD: 52 ML/MIN/1.73SQ M
GLUCOSE P FAST SERPL-MCNC: 98 MG/DL (ref 65–99)
POTASSIUM SERPL-SCNC: 3.9 MMOL/L (ref 3.5–5.3)
SODIUM SERPL-SCNC: 141 MMOL/L (ref 135–147)

## 2023-05-05 ENCOUNTER — RA CDI HCC (OUTPATIENT)
Dept: OTHER | Facility: HOSPITAL | Age: 78
End: 2023-05-05

## 2023-05-05 NOTE — PROGRESS NOTES
[facility] 75  coding opportunities          Chart Reviewed number of suggestions sent to Provider: 1  [id number] [id number]     Patients Insurance     Medicare Insurance: [employer] Advantage

## 2023-05-12 ENCOUNTER — OFFICE VISIT (OUTPATIENT)
Dept: FAMILY MEDICINE CLINIC | Facility: CLINIC | Age: 78
End: 2023-05-12

## 2023-05-12 VITALS
HEART RATE: 74 BPM | RESPIRATION RATE: 14 BRPM | DIASTOLIC BLOOD PRESSURE: 68 MMHG | TEMPERATURE: 98 F | BODY MASS INDEX: 34.09 KG/M2 | OXYGEN SATURATION: 99 % | WEIGHT: 158 LBS | SYSTOLIC BLOOD PRESSURE: 118 MMHG | HEIGHT: 57 IN

## 2023-05-12 DIAGNOSIS — E78.5 HYPERLIPIDEMIA ASSOCIATED WITH TYPE 2 DIABETES MELLITUS (HCC): ICD-10-CM

## 2023-05-12 DIAGNOSIS — E11.69 HYPERLIPIDEMIA ASSOCIATED WITH TYPE 2 DIABETES MELLITUS (HCC): ICD-10-CM

## 2023-05-12 DIAGNOSIS — I34.1 MITRAL VALVE PROLAPSE: ICD-10-CM

## 2023-05-12 DIAGNOSIS — I83.10 VARICOSE VEIN OF LOWER EXTREMITY WITH PHLEBITIS: ICD-10-CM

## 2023-05-12 DIAGNOSIS — R13.12 OROPHARYNGEAL DYSPHAGIA: ICD-10-CM

## 2023-05-12 DIAGNOSIS — Z98.890 S/P MVR (MITRAL VALVE REPAIR): ICD-10-CM

## 2023-05-12 DIAGNOSIS — Z00.01 ENCOUNTER FOR GENERAL ADULT MEDICAL EXAMINATION WITH ABNORMAL FINDINGS: Primary | ICD-10-CM

## 2023-05-12 DIAGNOSIS — I10 BENIGN ESSENTIAL HYPERTENSION: ICD-10-CM

## 2023-05-12 DIAGNOSIS — E78.5 DYSLIPIDEMIA: ICD-10-CM

## 2023-05-12 DIAGNOSIS — R31.9 HEMATURIA, UNSPECIFIED TYPE: ICD-10-CM

## 2023-05-12 LAB
SL AMB  POCT GLUCOSE, UA: ABNORMAL
SL AMB LEUKOCYTE ESTERASE,UA: ABNORMAL
SL AMB POCT BILIRUBIN,UA: ABNORMAL
SL AMB POCT BLOOD,UA: ABNORMAL
SL AMB POCT CLARITY,UA: CLEAR
SL AMB POCT COLOR,UA: YELLOW
SL AMB POCT KETONES,UA: ABNORMAL
SL AMB POCT NITRITE,UA: ABNORMAL
SL AMB POCT PH,UA: 5
SL AMB POCT SPECIFIC GRAVITY,UA: 1.02
SL AMB POCT URINE PROTEIN: ABNORMAL
SL AMB POCT UROBILINOGEN: 0.2

## 2023-05-12 RX ORDER — ATORVASTATIN CALCIUM 20 MG/1
20 TABLET, FILM COATED ORAL DAILY
Qty: 90 TABLET | Refills: 3 | Status: SHIPPED | OUTPATIENT
Start: 2023-05-12

## 2023-05-12 RX ORDER — LOSARTAN POTASSIUM 50 MG/1
50 TABLET ORAL DAILY
Qty: 90 TABLET | Refills: 3 | Status: SHIPPED | OUTPATIENT
Start: 2023-05-12

## 2023-05-12 RX ORDER — METOPROLOL SUCCINATE 50 MG/1
50 TABLET, EXTENDED RELEASE ORAL DAILY
Qty: 90 TABLET | Refills: 3 | Status: SHIPPED | OUTPATIENT
Start: 2023-05-12

## 2023-05-12 RX ORDER — TORSEMIDE 10 MG/1
10 TABLET ORAL DAILY
Qty: 90 TABLET | Refills: 3 | Status: SHIPPED | OUTPATIENT
Start: 2023-05-12

## 2023-05-12 NOTE — PATIENT INSTRUCTIONS
Medicare Preventive Visit Patient Instructions  Thank you for completing your Welcome to Medicare Visit or Medicare Annual Wellness Visit today  Your next wellness visit will be due in one year (5/12/2024)  The screening/preventive services that you may require over the next 5-10 years are detailed below  Some tests may not apply to you based off risk factors and/or age  Screening tests ordered at today's visit but not completed yet may show as past due  Also, please note that scanned in results may not display below  Preventive Screenings:  Service Recommendations Previous Testing/Comments   Colorectal Cancer Screening  * Colonoscopy    * Fecal Occult Blood Test (FOBT)/Fecal Immunochemical Test (FIT)  * Fecal DNA/Cologuard Test  * Flexible Sigmoidoscopy Age: 39-70 years old   Colonoscopy: every 10 years (may be performed more frequently if at higher risk)  OR  FOBT/FIT: every 1 year  OR  Cologuard: every 3 years  OR  Sigmoidoscopy: every 5 years  Screening may be recommended earlier than age 39 if at higher risk for colorectal cancer  Also, an individualized decision between you and your healthcare provider will decide whether screening between the ages of 74-80 would be appropriate  Colonoscopy: 10/10/2016  FOBT/FIT: Not on file  Cologuard: Not on file  Sigmoidoscopy: Not on file          Breast Cancer Screening Age: 36 years old  Frequency: every 1-2 years  Not required if history of left and right mastectomy Mammogram: 01/09/2020        Cervical Cancer Screening Between the ages of 21-29, pap smear recommended once every 3 years  Between the ages of 33-67, can perform pap smear with HPV co-testing every 5 years     Recommendations may differ for women with a history of total hysterectomy, cervical cancer, or abnormal pap smears in past  Pap Smear: Not on file    Screening Not Indicated   Hepatitis C Screening Once for adults [facility]  More frequently in patients at high risk for Hepatitis C Hep C Antibody: 05/10/2019    Screening Current   Diabetes Screening 1-2 times per year if you're at risk for diabetes or have pre-diabetes Fasting glucose: 98 mg/dL (5/4/2023)  A1C: 5 6 (4/6/2023)  Screening Not Indicated  History Diabetes   Cholesterol Screening Once every 5 years if you don't have a lipid disorder  May order more often based on risk factors  Lipid panel: 04/18/2023    Screening Not Indicated  History Lipid Disorder     Other Preventive Screenings Covered by Medicare:  1  Abdominal Aortic Aneurysm (AAA) Screening: covered once if your at risk  You're considered to be at risk if you have a family history of AAA  2  Lung Cancer Screening: covers low dose CT scan once per year if you meet all of the following conditions: (1) Age 50-69; (2) No signs or symptoms of lung cancer; (3) Current smoker or have quit smoking within the last 15 years; (4) You have a tobacco smoking history of at least 20 pack years (packs per day multiplied by number of years you smoked); (5) You get a written order from a healthcare provider  3  Glaucoma Screening: covered annually if you're considered high risk: (1) You have diabetes OR (2) Family history of glaucoma OR (3)  aged 48 and older OR (3)  American aged 72 and older  3  Osteoporosis Screening: covered every 2 years if you meet one of the following conditions: (1) You're estrogen deficient and at risk for osteoporosis based off medical history and other findings; (2) Have a vertebral abnormality; (3) On glucocorticoid therapy for more than 3 months; (4) Have primary hyperparathyroidism; (5) On osteoporosis medications and need to assess response to drug therapy  · Last bone density test (DXA Scan): 01/09/2020   5  HIV Screening: covered annually if you're between the age of 15-65  Also covered annually if you are younger than 13 and older than 72 with risk factors for HIV infection   For pregnant patients, it is covered up to 3 times per pregnancy  Immunizations:  Immunization Recommendations   Influenza Vaccine Annual influenza vaccination during flu season is recommended for all persons aged >= 6 months who do not have contraindications   Pneumococcal Vaccine   * Pneumococcal conjugate vaccine = PCV13 (Prevnar 13), PCV15 (Vaxneuvance), PCV20 (Prevnar 20)  * Pneumococcal polysaccharide vaccine = PPSV23 (Pneumovax) Adults 25-60 years old: 1-3 doses may be recommended based on certain risk factors  Adults 72 years old: 1-2 doses may be recommended based off what pneumonia vaccine you previously received   Hepatitis B Vaccine 3 dose series if at intermediate or high risk (ex: diabetes, end stage renal disease, liver disease)   Tetanus (Td) Vaccine - COST NOT COVERED BY MEDICARE PART B Following completion of primary series, a booster dose should be given every 10 years to maintain immunity against tetanus  Td may also be given as tetanus wound prophylaxis  Tdap Vaccine - COST NOT COVERED BY MEDICARE PART B Recommended at least once for all adults  For pregnant patients, recommended with each pregnancy  Shingles Vaccine (Shingrix) - COST NOT COVERED BY MEDICARE PART B  2 shot series recommended in those aged 48 and above     Health Maintenance Due:      Topic Date Due   • Breast Cancer Screening: Mammogram  01/09/2021   • Colorectal Cancer Screening  10/10/2021   • DXA SCAN  01/09/2022   • Hepatitis C Screening  Completed     Immunizations Due:  There are no preventive care reminders to display for this patient  Advance Directives   What are advance directives? Advance directives are legal documents that state your wishes and plans for medical care  These plans are made ahead of time in case you lose your ability to make decisions for yourself  Advance directives can apply to any medical decision, such as the treatments you want, and if you want to donate organs  What are the types of advance directives?   There are many types of advance directives, and each state has rules about how to use them  You may choose a combination of any of the following:  · Living will: This is a written record of the treatment you want  You can also choose which treatments you do not want, which to limit, and which to stop at a certain time  This includes surgery, medicine, IV fluid, and tube feedings  · Durable power of  for healthcare [facility]): This is a written record that states who you want to make healthcare choices for you when you are unable to make them for yourself  This person, called a proxy, is usually a family member or a friend  You may choose more than 1 proxy  · Do not resuscitate (DNR) order:  A DNR order is used in case your heart stops beating or you stop breathing  It is a request not to have certain forms of treatment, such as CPR  A DNR order may be included in other types of advance directives  · Medical directive: This covers the care that you want if you are in a coma, near death, or unable to make decisions for yourself  You can list the treatments you want for each condition  Treatment may include pain medicine, surgery, blood transfusions, dialysis, IV or tube feedings, and a ventilator (breathing machine)  · Values history: This document has questions about your views, beliefs, and how you feel and think about life  This information can help others choose the care that you would choose  Why are advance directives important? An advance directive helps you control your care  Although spoken wishes may be used, it is better to have your wishes written down  Spoken wishes can be misunderstood, or not followed  Treatments may be given even if you do not want them  An advance directive may make it easier for your family to make difficult choices about your care     Weight Management   Why it is important to manage your weight:  Being overweight increases your risk of health conditions such as heart disease, high blood pressure, type 2 diabetes, and certain types of cancer  It can also increase your risk for osteoarthritis, sleep apnea, and other respiratory problems  Aim for a slow, steady weight loss  Even a small amount of weight loss can lower your risk of health problems  How to lose weight safely:  A safe and healthy way to lose weight is to eat fewer calories and get regular exercise  You can lose up about 1 pound a week by decreasing the number of calories you eat by 500 calories each day  Healthy meal plan for weight management:  A healthy meal plan includes a variety of foods, contains fewer calories, and helps you stay healthy  A healthy meal plan includes the following:  · Eat whole-grain foods more often  A healthy meal plan should contain fiber  Fiber is the part of grains, fruits, and vegetables that is not broken down by your body  Whole-grain foods are healthy and provide extra fiber in your diet  Some examples of whole-grain foods are whole-wheat breads and pastas, oatmeal, brown rice, and bulgur  · Eat a variety of vegetables every day  Include dark, leafy greens such as spinach, kale, magi greens, and mustard greens  Eat yellow and orange vegetables such as carrots, sweet potatoes, and winter squash  · Eat a variety of fruits every day  Choose fresh or canned fruit (canned in its own juice or light syrup) instead of juice  Fruit juice has very little or no fiber  · Eat low-fat dairy foods  Drink fat-free (skim) milk or 1% milk  Eat fat-free yogurt and low-fat cottage cheese  Try low-fat cheeses such as mozzarella and other reduced-fat cheeses  · Choose meat and other protein foods that are low in fat  Choose beans or other legumes such as split peas or lentils  Choose fish, skinless poultry (chicken or turkey), or lean cuts of red meat (beef or pork)  Before you cook meat or poultry, cut off any visible fat  · Use less fat and oil  Try baking foods instead of frying them   Add less fat, such as "margarine, sour cream, regular salad dressing and mayonnaise to foods  Eat fewer high-fat foods  Some examples of high-fat foods include french fries, doughnuts, ice cream, and cakes  · Eat fewer sweets  Limit foods and drinks that are high in sugar  This includes candy, cookies, regular soda, and sweetened drinks  Exercise:  Exercise at least 30 minutes per day on most days of the week  Some examples of exercise include walking, biking, dancing, and swimming  You can also fit in more physical activity by taking the stairs instead of the elevator or parking farther away from stores  Ask your healthcare provider about the best exercise plan for you  Alcohol Use and Your Health    Drinking too much can harm your health  Excessive alcohol use leads to about 88,000 death in the [place] each year, and shortens the life of those who diet by almost 30 years  Further, excessive drinking cost the economy $249 billion in 2010  Most excessive drinkers are not alcohol dependent  Excessive alcohol use has immediate effects that increase the risk of many harmful health conditions  These are most often the result of binge drinking  Over time, excessive alcohol use can lead to the development of chronic diseases and other series health problems  What is considered a \"drink\"? Excessive alcohol use includes:  · Binge Drinking: For women, 4 or more drinks consumed on one occasion  For men, 5 or more drinks consumed on one occasion  · Heavy Drinking: For women, 8 or more drinks per week  For men, 15 or more drinks per week  · Any alcohol used by pregnant women  · Any alcohol used by those under the age of 21 years    If you choose to drink, do so in moderation:  · Do not drink at all if you are under the age of 24, or if you are or may be pregnant, or have health problems that could be made worse by drinking    · For women, up to 1 drink per day  · For men, up to 2 drinks a day    No one should begin " drinking or drink more frequently based on potential health benefits    Short-Term Health Risks:  · Injuries: motor vehicle crashes, falls, drownings, burns  · Violence: homicide, suicide, sexual assault, intimate partner violence  · Alcohol poisoning  · Reproductive health: risky sexual behaviors, unintended prengnacy, sexually transmitted diseases, miscarriage, stillbirth, fetal alcohol syndrome    Long-Term Health Risks:  · Chronic diseases: high blood pressure, heart disease, stroke, liver disease, digestive problems  · Cancers: breast, mouth and throat, liver, colon  · Learning and memory problems: dementia, poor school performance  · Mental health: depression, anxiety, insomnia  · Social problems: lost productivity, family problems, unemployment  · Alcohol dependence    For support and more information:  · Substance Abuse and [facility] , [address] [place]  Web Address: https://[employer]/    · Alcoholics Anonymous        Web Address: http://[employer]/    https://www cdc gov/alcohol/fact-sheets/alcohol-use htm     © Copyright [employer] 2018 Information is for End User's use only and may not be sold, redistributed or otherwise used for commercial purposes  All illustrations and images included in CareNotes® are the copyrighted property of [employer]  or [address] [place] [address]    Osteoporosis Education   Osteoporosis  is a long-term medical condition that causes your bones to become weak, brittle, and more likely to fracture  Osteoporosis occurs when your body absorbs more bone than it makes  It is also caused by a lack of calcium and estrogen (female hormone)  Common symptoms include the following: You may not have any signs or symptoms  You may break a bone after a muscle strain, bump, or fall  A break usually occurs in the hip, spine, or wrist  A collapsed vertebra (bone in your spine) may cause severe back pain or loss of height from bent posture    Call your doctor if:   ·  You have severe pain  ·  You have increasing pain after a fall  ·  You have pain when you do your daily activities  ·  You have questions or concerns about your condition or care  Diagnosis of osteoporosis:   · Blood and urine tests  measure your calcium, vitamin D, and estrogen levels  · An x-ray or CT may show thinned bones or a fracture  You may be given contrast liquid to help the bones show up better in the pictures  Tell the healthcare provider if you have ever had an allergic reaction to contrast liquid  Do not enter the MRI room with anything metal  Metal can cause serious injury  Tell the healthcare provider if you have any metal in or on your body  · A bone density test  compares your bone thickness with what is expected for someone of your age, gender, and ethnicity  Treatment for osteoporosis may include medicines to prevent bone loss, build new bone, and increase estrogen  These medicines help prevent fractures and may be given as a pill or injection  Ask your healthcare provider for more information on these medicines  Prevent bone loss:  · Eat healthy foods that are high in calcium  This helps keep your bones strong  Good sources of calcium are milk, cheese, broccoli, tofu, almonds, and canned salmon and sardines  Recommended to get at least 1200mg daily of calcium  · Increase your vitamin D intake  Vitamin D is in fish oils, some vegetables, and fortified milk, cereal, and bread  Vitamin D is also formed in the skin when it is exposed to the sun  Ask your healthcare provider how much sunlight is safe for you  You will require at least 800 units of vitamin D daily taken as a supplement  · Drink liquids as directed  Ask your healthcare provider how much liquid to drink each day and which liquids are best for you  Do not have alcohol or caffeine  They decrease bone mineral density, which can weaken your bones  · Exercise regularly    Ask your healthcare provider "about the best exercise plan for you  Weight bearing exercise for 30 minutes, 3 times a week can help build and strengthen bone  · Do not smoke  Nicotine and other chemicals in cigarettes and cigars can cause lung damage  Ask your healthcare provider for information if you currently smoke and need help to quit  E-cigarettes or smokeless tobacco still contain nicotine  Talk to your healthcare provider before you use these products  · Go to physical therapy as directed  A physical therapist teaches you exercises to help improve movement and muscle strength  · Alcohol  It is recommended to avoid heavy alcohol use as increased consumption of alcohol is known to cause bone loss  © Copyright [employer] 2021 Information is for End User's use only and may not be sold, redistributed or otherwise used for commercial purposes  All illustrations and images included in CareNotes® are the copyrighted property of [employer] Inc  or [employer]    Calcium and vitamin D for bone health (Beyond the Basics)    CALCIUM AND VITAMIN D OVERVIEW  Osteoporosis is a common bone disorder that causes a progressive loss in bone density and mass  As a result, bones become thin, weakened, and easily fractured  It is estimated that more than 1 3 million osteoporosis-associated (or \"osteoporotic\") fractures occur every year in the [place], primarily of bone within the spine (the vertebrae), the hip, and the forearm near the wrist  =    A number of treatments can help to prevent loss of bone and treat low bone mass  However, the first step in preventing or treating osteoporosis is to consume foods and drinks that provide calcium, a mineral essential for bone strength, and vitamin D, which aids in calcium breakdown and absorption  =    CALCIUM AND VITAMIN D BENEFITS  Good nutrition is important at all ages to keep the bones healthy      · Taking calcium reduces bone loss and decreases the risk of fracturing the vertebrae (the " bones that surround the spinal cord)  · Consuming calcium during childhood (eg, in milk) can lead to higher bone mass in adulthood  This increase in bone density can reduce the risk of fractures later in life  · Calcium may also have benefits in other body systems by reducing blood pressure and cholesterol levels  · Calcium and vitamin D supplements may help prevent tooth loss in older adults  RECOMMENDATIONS FOR CALCIUM    General recommendations -- Premenopausal women and men should consume at least 1000 mg of calcium, while postmenopausal women should consume 1200 mg (total diet plus supplement)  You should not consume more than 2000 mg of calcium per day (total diet plus supplement) due to the risk of side effects  Calcium in the diet -- The primary sources of calcium in the diet include milk and other dairy products, such as hard cheese, cottage cheese, or yogurt, as well as green vegetables, such as kale and broccoli (table 1)  Some cereals, soy products, and fruit juices are fortified with calcium  Calcium supplements -- The body is able to absorb calcium contained in supplements as well as from dietary sources  If it is not possible to get enough calcium from dietary sources, consult a health care provider to determine the best type, dose, and timing of calcium supplements  · Calcium carbonate is effective and is the least expensive form of calcium  It is best absorbed with a low-iron meal (such as breakfast)  Calcium carbonate may not be absorbed well in people who also take a specific medication for gastroesophageal reflux (called a proton pump inhibitor or H2 blocker), which blocks stomach acid  · Many natural calcium carbonate preparations such as oyster shells or bone meal contain some lead    · Calcium citrate is well absorbed in the fasting state as well as with a meal   · Calcium doses above 500 mg are not absorbed as well as smaller doses, so large doses of supplements should be taken in divided doses (eg, in the morning and evening)  · Calcium supplements do not replace other osteoporosis treatments such as hormone replacement, bisphosphonates (eg, risedronate [sample brand name: Actonel] and alendronate [brand name: Fosamax]), and raloxifene (brand name: Evista)  Calcium and vitamin D supplements alone are usually insufficient to prevent age-related bone loss, although they may be beneficial in some subgroups (older adults, those with very low intake)  In most patients with or at risk for osteoporosis, the addition of medication or hormonal therapy is necessary in order to slow the breakdown and removal of bone (ie, resorption)  Underlying gastrointestinal diseases -- Patients who do not adequately absorb nutrients from the gastrointestinal tract (due to malabsorption) may require more than 1000 to 1200 mg of calcium per day  In such cases, a health care provider can help to determine the optimal dose of calcium  Medications -- All medications should be discussed with a health care provider to ensure that possible interactions with calcium are identified  Certain medications change the amount of calcium that is absorbed and/or excreted  As an example, loop diuretics (eg, furosemide [sample brand name: Lasix]) increase the amount of calcium excreted in the urine  On the other hand, thiazide diuretics (eg, hydrochlorothiazide) can reduce levels of calcium in the urine, potentially reducing the risk of bone loss and kidney stones  Side effects of calcium -- Calcium is usually easily tolerated when it is taken in divided doses several times per day  Some people experience side effects related to calcium, including constipation and indigestion  Calcium supplements interfere with the absorption of iron and thyroid hormone, and therefore, these medications should be taken at different times      Kidney stones -- There is no evidence that consuming large amounts of calcium (from foods and drinks) increases the risk of kidney stones, or that avoiding dietary calcium decreases the risk  In fact, avoiding dairy products is likely to increase the risk of kidney stones  However, use of calcium supplements may increase the risk of kidney stones in susceptible individuals by raising the level of calcium in the urine  This is particularly true if the supplement is taken between meals or at bedtime, and this is one of the reasons we prefer for patients to get as much of their calcium requirement through dietary means  IMPORTANCE OF VITAMIN D  Vitamin D decreases bone loss and lowers the risk of fracture, especially in older men and women  Along with calcium, vitamin D also helps to prevent and treat osteoporosis  To absorb calcium efficiently, an adequate amount of vitamin D must be present  Vitamin D is normally made in the skin after exposure to sunlight  Recommendations for vitamin D -- The current recommendation is that men over 70 years and postmenopausal women consume at least 800 international units (20 micrograms) of vitamin D per day  Lower levels of vitamin D are not as effective, while high doses can be toxic, especially if taken for long periods of time  Although the optimal intake has not been clearly established in premenopausal women or in younger men with osteoporosis, 600 international units (15 micrograms) of vitamin D daily is generally suggested  Vitamin D is available as an individual supplement and is included in most multivitamins and some calcium supplements (table 2)  Milk is a relatively good dietary source of vitamin D, with approximately 100 international units (2 5 micrograms) per cup (240 mL), and salmon has 800 to 1000 international units (20 to 25 micrograms) of vitamin D per serving      Most healthy people don't need to check with their health care provider before taking standard doses of vitamin D and don't need monitoring of vitamin D levels if they are not being treated for vitamin D deficiency  However, recommendations for vitamin D supplementation may be different in people with certain underlying medical conditions, such as sarcoidosis or lymphoma  In these situations, a provider will determine if supplements are needed; if so, the person's vitamin D and calcium levels should be monitored with regular tests  ©2633 [employer], [address] rights reserved

## 2023-05-12 NOTE — PROGRESS NOTES
BMI Counseling: Body mass index is 34 19 kg/m²  The BMI is above normal  Nutrition recommendations include reducing portion sizes  Assessment and Plan:     Problem List Items Addressed This Visit    None       Preventive health issues were discussed with patient, and age appropriate screening tests were ordered as noted in patient's After Visit Summary  Personalized health advice and appropriate referrals for health education or preventive services given if needed, as noted in patient's After Visit Summary  History of Present Illness:     Patient presents for a Medicare Wellness Visit    HPI   Patient Care Team:  Ally Lizarraga MD as PCP - General (Internal Medicine)     Review of Systems:     Review of Systems     Problem List:     Patient Active Problem List   Diagnosis   • Benign essential hypertension   • Vitamin D insufficiency   • Dyslipidemia   • Other osteoporosis without current pathological fracture   • Seasonal allergies   • Elevated parathyroid hormone   • Varicose veins of left lower extremity with pain   • Bilateral lower extremity edema   • S/P MVR (mitral valve repair)   • Mitral valve prolapse      Past Medical and Surgical History:     Past Medical History:   Diagnosis Date   • Arthritis    • Cellulitis of face 10/16/2019    Bilateral nares   • Family history of colon cancer in mother    • Functional murmur    • Hypercalcemia 2016   • Hypertension     Dx'd in , controlled    • Osteoporosis    • Screening for breast cancer 2018   • SOB (shortness of breath) 2020   • Viral upper respiratory tract infection 10/30/2018     Past Surgical History:   Procedure Laterality Date   • CARDIAC SURGERY     •  SECTION      x2, [address]     • MITRAL VALVE REPAIR     • AR COLONOSCOPY FLX DX W/COLLJ SPEC WHEN PFRMD N/A 10/10/2016    Procedure: COLONOSCOPY;  Surgeon: Rg Henry MD;  Location: [facility];   Service: Gastroenterology   • TOE SURGERY Bilateral     hammer toe correction surgery   • TONSILLECTOMY      with Adenoidectomy      Family History:     Family History   Problem Relation Age of Onset   • Colon cancer Mother 68   • No Known Problems Father    • No Known Problems Sister    • No Known Problems Daughter    • No Known Problems Maternal Grandmother    • No Known Problems Maternal Grandfather    • No Known Problems Paternal Grandmother    • No Known Problems Paternal Grandfather    • No Known Problems Son    • No Known Problems Son    • No Known Problems Paternal Aunt    • No Known Problems Paternal Aunt    • No Known Problems Paternal Aunt    • No Known Problems Paternal Aunt       Social History:     Social History     Socioeconomic History   • Marital status: /Civil Union     Spouse name: None   • Number of children: None   • Years of education: None   • Highest education level: None   Occupational History   • None   Tobacco Use   • Smoking status: Never   • Smokeless tobacco: Never   Vaping Use   • Vaping Use: Never used   Substance and Sexual Activity   • Alcohol use: Yes     Comment: Rarely    • Drug use: No   • Sexual activity: Not Currently   Other Topics Concern   • None   Social History Narrative    Caffeine use via coffee, 1 serv/day    Exercises regularly, walking      Social Determinants of Health     Financial Resource Strain: Low Risk    • Difficulty of Paying Living Expenses: Not hard at all   Food Insecurity: Not on file   Transportation Needs: No Transportation Needs   • Lack of Transportation (Medical): No   • Lack of Transportation (Non-Medical):  No   Physical Activity: Not on file   Stress: Not on file   Social Connections: Not on file   Intimate Partner Violence: Not on file   Housing Stability: Not on file      Medications and Allergies:     Current Outpatient Medications   Medication Sig Dispense Refill   • aspirin (ECOTRIN LOW STRENGTH) 81 mg EC tablet Take 1 tablet (81 mg total) by mouth daily 90 tablet 3   • atorvastatin (LIPITOR) 20 mg tablet Take 1 tablet (20 mg total) by mouth daily 90 tablet 3   • Coenzyme Q10 10 MG capsule Take 1 capsule (10 mg total) by mouth daily 90 capsule 3   • losartan (COZAAR) 50 mg tablet Take 1 tablet (50 mg total) by mouth daily In the Morning    90 tablet 3   • metoprolol succinate (TOPROL-XL) 50 mg 24 hr tablet Take 1 tablet (50 mg total) by mouth daily In the evening daily 90 tablet 3   • torsemide (DEMADEX) 10 mg tablet Take 1 tablet (10 mg total) by mouth daily 90 tablet 3   • buPROPion (WELLBUTRIN) 75 mg tablet Take 1 tablet (75 mg total) by mouth 2 (two) times a day 30 tablet 0   • polyethylene glycol (GOLYTELY) 4000 mL solution Take 4,000 mL by mouth once for 1 dose Per office instructions 4000 mL 0     No current facility-administered medications for this visit  Allergies   Allergen Reactions   • Flonase [Fluticasone] Dermatitis      Immunizations:     Immunization History   Administered Date(s) Administered   • COVID-19 PFIZER VACCINE 0 3 ML IM 03/06/2021, 03/25/2021   • Influenza Split High Dose Preservative Free IM 10/27/2016   • Influenza, Seasonal Vaccine, Quadrivalent, Adjuvanted,   5e 10/07/2022   • Pneumococcal Conjugate 13-Valent 01/21/2020   • Pneumococcal Polysaccharide PPV23 04/07/2022   • TD (adult) Preservative Free 04/07/2022      Health Maintenance:         Topic Date Due   • Breast Cancer Screening: Mammogram  01/09/2021   • Colorectal Cancer Screening  10/10/2021   • DXA SCAN  01/09/2022   • Hepatitis C Screening  Completed     There are no preventive care reminders to display for this patient  Medicare Screening Tests and Risk Assessments:     Rachelle is here for her Subsequent Wellness visit  Health Risk Assessment:   Patient rates overall health as good  Patient feels that their physical health rating is same  Patient is very satisfied with their life  Eyesight was rated as same  Hearing was rated as same   Patient feels that their emotional and mental health rating is much better  Patients states they are never, rarely angry  Patient states they are sometimes unusually tired/fatigued  Pain experienced in the last 7 days has been some  Patient's pain rating has been 4/10  Patient states that she has experienced no weight loss or gain in last 6 months  Depression Screening:   PHQ-2 Score: 0      Fall Risk Screening: In the past year, patient has experienced: no history of falling in past year      Urinary Incontinence Screening:   Patient has not leaked urine accidently in the last six months  Home Safety:  Patient does not have trouble with stairs inside or outside of their home  Patient has working smoke alarms and has working carbon monoxide detector  Home safety hazards include: none  Nutrition:   Current diet is Regular  Medications:   Patient is not currently taking any over-the-counter supplements  Patient is able to manage medications  Activities of Daily Living (ADLs)/Instrumental Activities of Daily Living (IADLs):   Walk and transfer into and out of bed and chair?: Yes  Dress and groom yourself?: Yes    Bathe or shower yourself?: Yes    Feed yourself? Yes  Do your laundry/housekeeping?: Yes  Manage your money, pay your bills and track your expenses?: Yes  Make your own meals?: Yes    Do your own shopping?: Yes    Durable Medical Equipment Suppliers  0    Previous Hospitalizations:   Any hospitalizations or ED visits within the last 12 months?: No      Advance Care Planning:   Living will: Yes    Durable POA for healthcare:  Yes    Advanced directive: Yes      PREVENTIVE SCREENINGS      Cardiovascular Screening:    General: Screening Not Indicated, History Lipid Disorder, Risks and Benefits Discussed and Screening Current      Diabetes Screening:     General: Screening Not Indicated, History Diabetes, Risks and Benefits Discussed and Screening Current      Colorectal Cancer Screening:     General: Risks and Benefits Discussed and Screening Current      Breast Cancer Screening:     General: Risks and Benefits Discussed and Screening Current      Cervical Cancer Screening:    General: Screening Not Indicated and Risks and Benefits Discussed      Osteoporosis Screening:    General: Screening Not Indicated, History Osteoporosis and Risks and Benefits Discussed      Abdominal Aortic Aneurysm (AAA) Screening:        General: Risks and Benefits Discussed      Lung Cancer Screening:     General: Screening Not Indicated and Risks and Benefits Discussed      Hepatitis C Screening:    General: Screening Current and Risks and Benefits Discussed    Screening, Brief Intervention, and Referral to Treatment (SBIRT)    Screening  Typical number of drinks in a day: 0  Typical number of drinks in a week: 0  Interpretation: Low risk drinking behavior  AUDIT-C Screenin) How often did you have a drink containing alcohol in the past year? 2 to 3 times a week  2) How many drinks did you have on a typical day when you were drinking in the past year? 1 to 2  3) How often did you have 6 or more drinks on one occasion in the past year? never    AUDIT-C Score: 3  Interpretation: Score 3-12 (female): POSITIVE screen for alcohol misuse    AUDIT Screenin) How often during the last year have you found that you were not able to stop drinking once you had started? 0 - never  5) How often during the last year have you failed to do what was normally expected from you because of drinking? 0 - never  6) How often during the last year have you needed a first drink in the morning to get yourself going after a heavy drinking session?  0 - never  7) How often during the last year have you had a feeling of guilt or remorse after drinking? 0 - never  8) How often during the last year have you been unable to remember what happened the night before because you had been drinking? 0 - never  9) Have you or someone else been injured as a result of your drinking? 0 - no  10) "Has a relative or friend or a doctor or another health worker been concerned about your drinking or suggested you cut down? 0 - no    AUDIT Score: 3  Interpretation: Low risk alcohol consumption    Single Item Drug Screening:  How often have you used an illegal drug (including marijuana) or a prescription medication for non-medical reasons in the past year? never    Single Item Drug Screen Score: 0  Interpretation: Negative screen for possible drug use disorder    Other Counseling Topics:   Car/seat belt/driving safety, skin self-exam, sunscreen and calcium and vitamin D intake and regular weightbearing exercise  No results found       Physical Exam:     /68 (BP Location: Left arm, Patient Position: Sitting, Cuff Size: Standard)   Pulse 74   Temp 98 °F (36 7 °C) (Tympanic)   Resp 14   Ht 4' 9\" (1 448 m)   Wt 71 7 kg (158 lb)   SpO2 99%   BMI 34 19 kg/m²     Physical Exam     Brianda Michael MD  "

## 2023-05-12 NOTE — PROGRESS NOTES
Name: Miguel Amor      : 1945      MRN: [id number]  Encounter Provider: Estuardo Calvin MD  Encounter Date: 2023   Encounter department: [facility] [address] [place] [address]     1  Encounter for general adult medical examination with abnormal findings    2  Mitral valve prolapse  -     torsemide (DEMADEX) 10 mg tablet; Take 1 tablet (10 mg total) by mouth daily    3  S/P MVR (mitral valve repair)  -     torsemide (DEMADEX) 10 mg tablet; Take 1 tablet (10 mg total) by mouth daily  -     metoprolol succinate (TOPROL-XL) 50 mg 24 hr tablet; Take 1 tablet (50 mg total) by mouth daily In the evening daily    4  Benign essential hypertension  -     losartan (COZAAR) 50 mg tablet; Take 1 tablet (50 mg total) by mouth daily In the Morning       5  Hyperlipidemia associated with type 2 diabetes mellitus (HCC)  -     Coenzyme Q10 10 MG capsule; Take 1 capsule (10 mg total) by mouth daily    6  Dyslipidemia  -     atorvastatin (LIPITOR) 20 mg tablet; Take 1 tablet (20 mg total) by mouth daily    7  Oropharyngeal dysphagia  -     FL UPPER GI UGI; Future; Expected date: 2023    8  Varicose vein of lower extremity with phlebitis  -     VAS lower limb venous duplex study, complete bilateral; Future; Expected date: 2023    9  Hematuria, unspecified type  -     POCT urine dip    AWV : done in detail    Life style Mod  RTc in 2-3 mos w Blood w ork  Consider GI for EGD       Subjective      70 Y O lady is here for AWV and Regular check Up, she has few symptoms, recent Blood work and med list reviewed w  Pt in detail    Review of Systems   Constitutional: Negative for chills, fatigue and fever  HENT: Positive for postnasal drip  Negative for congestion, facial swelling, sore throat, trouble swallowing and voice change  Eyes: Negative for pain, discharge and visual disturbance  Respiratory: Negative for cough, shortness of breath and wheezing      Cardiovascular: "Negative for chest pain, palpitations and leg swelling  Gastrointestinal: Negative for abdominal pain, blood in stool, constipation, diarrhea and nausea  Dysphagia, recently   Endocrine: Negative for polydipsia, polyphagia and polyuria  Genitourinary: Negative for difficulty urinating, hematuria and urgency  Musculoskeletal: Negative for arthralgias and myalgias  Skin: Negative for rash  Neurological: Negative for dizziness, tremors, weakness and headaches  Hematological: Negative for adenopathy  Does not bruise/bleed easily  Psychiatric/Behavioral: Negative for dysphoric mood, sleep disturbance and suicidal ideas  Current Outpatient Medications on File Prior to Visit   Medication Sig   • aspirin (ECOTRIN LOW STRENGTH) 81 mg EC tablet Take 1 tablet (81 mg total) by mouth daily   • [DISCONTINUED] atorvastatin (LIPITOR) 20 mg tablet Take 1 tablet (20 mg total) by mouth daily   • [DISCONTINUED] Coenzyme Q10 10 MG capsule Take 1 capsule (10 mg total) by mouth daily   • [DISCONTINUED] losartan (COZAAR) 50 mg tablet Take 1 tablet (50 mg total) by mouth daily In the Morning      • [DISCONTINUED] metoprolol succinate (TOPROL-XL) 50 mg 24 hr tablet Take 1 tablet (50 mg total) by mouth daily In the evening daily   • [DISCONTINUED] torsemide (DEMADEX) 10 mg tablet Take 1 tablet (10 mg total) by mouth daily   • buPROPion (WELLBUTRIN) 75 mg tablet Take 1 tablet (75 mg total) by mouth 2 (two) times a day   • [DISCONTINUED] polyethylene glycol (GOLYTELY) 4000 mL solution Take 4,000 mL by mouth once for 1 dose Per office instructions       Objective     /68 (BP Location: Left arm, Patient Position: Sitting, Cuff Size: Standard)   Pulse 74   Temp 98 °F (36 7 °C) (Tympanic)   Resp 14   Ht 4' 9\" (1 448 m)   Wt 71 7 kg (158 lb)   SpO2 99%   BMI 34 19 kg/m²     Physical Exam  Constitutional:       General: She is not in acute distress  HENT:      Head: Normocephalic        Mouth/Throat:      " Pharynx: No oropharyngeal exudate  Eyes:      General: No scleral icterus  Conjunctiva/sclera: Conjunctivae normal       Pupils: Pupils are equal, round, and reactive to light  Neck:      Thyroid: No thyromegaly  Cardiovascular:      Rate and Rhythm: Normal rate and regular rhythm  Heart sounds: Murmur heard  Pulmonary:      Effort: Pulmonary effort is normal  No respiratory distress  Breath sounds: Normal breath sounds  No wheezing or rales  Abdominal:      General: Bowel sounds are normal  There is no distension  Palpations: Abdomen is soft  Tenderness: There is no abdominal tenderness  There is no guarding or rebound  Musculoskeletal:         General: No tenderness  Cervical back: Neck supple  Lymphadenopathy:      Cervical: No cervical adenopathy  Skin:     Coloration: Skin is not pale  Findings: No rash  Neurological:      Mental Status: She is alert and oriented to person, place, and time  Sensory: No sensory deficit  Motor: No weakness         Bertram Nguyen MD  Answers for HPI/ROS submitted by the patient on 5/11/2023  How often during the last year have you found that you were not able to stop drinking once you had started?: 0 - never  How often during the last year have you failed to do what was normally expected from you because of drinking?: 0 - never  How often during the last year have you needed a first drink in the morning to get yourself going after a heavy drinking session?: 0 - never  How often during the last year have you had a feeling of guilt or remorse after drinking?: 0 - never  How often during the last year have you been unable to remember what happened the night before because you had been drinking?: 0 - never  Have you or someone else been injured as a result of your drinking?: 0 - no  Has a relative or friend or a doctor or another health worker been concerned about your drinking or suggested you cut down?: 0 - no  How would you "rate your overall health?: good  Compared to last year, how is your physical health?: same  In general, how satisfied are you with your life?: very satisfied  Compared to last year, how is your eyesight?: same  Compared to last year, how is your hearing?: same  Compared to last year, how is your emotional/mental health?: much better  How often is anger a problem for you?: never, rarely  How often do you feel unusually tired/fatigued?: sometimes  In the past 7 days, how much pain have you experienced?: some  If you answered \"some\" or \"a lot\", please rate the severity of your pain on a scale of 1 to 10 (1 being the least severe pain and 10 being the most intense pain)  : 4/10  In the past 6 months, have you lost or gained 10 pounds without trying?: No  One or more falls in the last year: No  In the past 6 months, have you accidentally leaked urine?: No  Do you have trouble with the stairs inside or outside your home?: No  Does your home have working smoke alarms?: Yes  Does your home have a carbon monoxide monitor?: Yes  Which safety hazards (if any) have you experienced in your home? Please select all that apply : none  How would you describe your current diet?  Please select all that apply : Regular  In addition to prescription medications, are you taking any over-the-counter supplements?: No  Can you manage your medications?: Yes  Are you currently taking any opioid medications?: No  Can you walk and transfer into and out of your bed and chair?: Yes  Can you dress and groom yourself?: Yes  Can you bathe or shower yourself?: Yes  Can you feed yourself?: Yes  Can you do your laundry/ housekeeping?: Yes  Can you manage your money, pay your bills, and track your expenses?: Yes  Can you make your own meals?: Yes  Can you do your own shopping?: Yes  Please list your DME (Durable Medical Equipment) supplier, if you use one : 0  Within the last 12 months, have you had any hospitalizations or Emergency Department visits?: " No  Do you have a living will?: Yes  Do you have a Durable POA (Power of ) for healthcare decisions?: Yes  Do you have an Advanced Directive for end of life decisions?: Yes  How often have you used an illegal drug (including marijuana) or a prescription medication for non-medical reasons in the past year?: never  What is the typical number of drinks you consume in a day?: 0  What is the typical number of drinks you consume in a week?: 0  How often did you have a drink containing alcohol in the past year?: 2 to 3 times a week  How many drinks did you have on a typical day  when you were drinking in the past year?: 1 to 2  How often did you have 6 or more drinks on one occasion in the past year?: never

## 2023-05-19 ENCOUNTER — HOSPITAL ENCOUNTER (OUTPATIENT)
Dept: NUCLEAR MEDICINE | Facility: HOSPITAL | Age: 78
Discharge: HOME | End: 2023-05-19

## 2023-05-19 ENCOUNTER — HOSPITAL ENCOUNTER (OUTPATIENT)
Dept: VASCULAR ULTRASOUND | Facility: HOSPITAL | Age: 78
End: 2023-05-19

## 2023-05-19 DIAGNOSIS — E83.52 HYPERCALCEMIA: ICD-10-CM

## 2023-05-19 DIAGNOSIS — R79.89 ELEVATED PTHRP LEVEL: ICD-10-CM

## 2023-05-19 NOTE — TELEPHONE ENCOUNTER
Thanks, I will be in the office next week and will talk to the patient next week  Complex Repair And Xenograft Text: The defect edges were debeveled with a #15 scalpel blade.  The primary defect was closed partially with a complex linear closure.  Given the location of the defect, shape of the defect and the proximity to free margins a xenograft was deemed most appropriate to repair the remaining defect.  The graft was trimmed to fit the size of the remaining defect.  The graft was then placed in the primary defect, oriented appropriately, and sutured into place.

## 2023-05-30 ENCOUNTER — TELEPHONE (OUTPATIENT)
Dept: OTHER | Facility: OTHER | Age: 78
End: 2023-05-30

## 2023-05-30 NOTE — TELEPHONE ENCOUNTER
Patient called to make sure she is still scheduled for her colonoscopy next Monday  Kindly call back to verify that her procedure was cleared and covered by her insurance  Kindly callback to verify at your earliest convenience

## 2023-06-05 ENCOUNTER — HOSPITAL ENCOUNTER (OUTPATIENT)
Dept: GASTROENTEROLOGY | Facility: AMBULARY SURGERY CENTER | Age: 78
Setting detail: OUTPATIENT SURGERY
Discharge: HOME | End: 2023-06-05
Attending: INTERNAL MEDICINE | Admitting: INTERNAL MEDICINE
Payer: COMMERCIAL

## 2023-06-05 ENCOUNTER — ANESTHESIA (OUTPATIENT)
Dept: GASTROENTEROLOGY | Facility: AMBULARY SURGERY CENTER | Age: 78
End: 2023-06-05

## 2023-06-05 ENCOUNTER — ANESTHESIA EVENT (OUTPATIENT)
Dept: GASTROENTEROLOGY | Facility: AMBULARY SURGERY CENTER | Age: 78
End: 2023-06-05

## 2023-06-05 VITALS
RESPIRATION RATE: 20 BRPM | OXYGEN SATURATION: 97 % | HEART RATE: 65 BPM | BODY MASS INDEX: 34.09 KG/M2 | TEMPERATURE: 97 F | HEIGHT: 57 IN | DIASTOLIC BLOOD PRESSURE: 69 MMHG | WEIGHT: 158 LBS | SYSTOLIC BLOOD PRESSURE: 107 MMHG

## 2023-06-05 DIAGNOSIS — Z86.010 HISTORY OF COLON POLYPS: ICD-10-CM

## 2023-06-05 PROCEDURE — 88305 TISSUE EXAM BY PATHOLOGIST: CPT | Performed by: STUDENT IN AN ORGANIZED HEALTH CARE EDUCATION/TRAINING PROGRAM

## 2023-06-05 RX ORDER — PROPOFOL 10 MG/ML
INJECTION, EMULSION INTRAVENOUS AS NEEDED
Status: DISCONTINUED | OUTPATIENT
Start: 2023-06-05 | End: 2023-06-05

## 2023-06-05 RX ORDER — SODIUM CHLORIDE, SODIUM LACTATE, POTASSIUM CHLORIDE, CALCIUM CHLORIDE 600; 310; 30; 20 MG/100ML; MG/100ML; MG/100ML; MG/100ML
INJECTION, SOLUTION INTRAVENOUS CONTINUOUS PRN
Status: DISCONTINUED | OUTPATIENT
Start: 2023-06-05 | End: 2023-06-05

## 2023-06-05 RX ADMIN — SODIUM CHLORIDE, SODIUM LACTATE, POTASSIUM CHLORIDE, AND CALCIUM CHLORIDE: .6; .31; .03; .02 INJECTION, SOLUTION INTRAVENOUS at 09:02

## 2023-06-05 RX ADMIN — PROPOFOL 20 MG: 10 INJECTION, EMULSION INTRAVENOUS at 09:14

## 2023-06-05 RX ADMIN — PROPOFOL 20 MG: 10 INJECTION, EMULSION INTRAVENOUS at 09:18

## 2023-06-05 RX ADMIN — PROPOFOL 30 MG: 10 INJECTION, EMULSION INTRAVENOUS at 09:24

## 2023-06-05 RX ADMIN — PROPOFOL 40 MG: 10 INJECTION, EMULSION INTRAVENOUS at 09:11

## 2023-06-05 RX ADMIN — PROPOFOL 30 MG: 10 INJECTION, EMULSION INTRAVENOUS at 09:21

## 2023-06-05 NOTE — ANESTHESIA PREPROCEDURE EVALUATION
Procedure:  COLONOSCOPY    Relevant Problems   ANESTHESIA (within normal limits)   (-) History of anesthesia complications      CARDIO   (+) Benign essential hypertension   (+) Mitral valve prolapse   (-) Chest pain   (-) MARAVILLA (dyspnea on exertion)      PULMONARY   (-) Shortness of breath   (-) URI (upper respiratory infection)      Other   (+) S/P MVR (mitral valve repair) (2021)      TTE 2022:  •  Left Ventricle: Left ventricle is normal in size  Systolic function is   normal with an ejection fraction of 60-65%  There is grade I (mild)   diastolic dysfunction  •  Left Atrium: Left atrium cavity size is normal    •  Right Ventricle: Right ventricle cavity is normal  Systolic function is   normal    •  Mitral Valve: Mitral valve structure is normal  The mitral valve has   been surgically repaired  There is mild regurgitation  There is no   evidence of mitral valve stenosis  •  IVC/SVC: The inferior vena cava demonstrates a diameter of <=21 mm and   collapses >50%; therefore, the right atrial pressure is estimated at 0-5   mmHg  •  Pulmonic Valve: The estimated pulmonary artery systolic pressure is   78 5 mmHg  Physical Exam    Airway    Mallampati score: II  TM Distance: >3 FB  Neck ROM: full     Dental       Cardiovascular      Pulmonary      Other Findings        Anesthesia Plan  ASA Score- 2     Anesthesia Type- IV sedation with anesthesia with ASA Monitors  Additional Monitors:   Airway Plan:           Plan Factors-Exercise tolerance (METS): >4 METS  Chart reviewed  EKG reviewed  Existing labs reviewed  Patient summary reviewed  Induction- intravenous  Postoperative Plan-     Informed Consent- Anesthetic plan and risks discussed with patient  I personally reviewed this patient with the CRNA  Discussed and agreed on the Anesthesia Plan with the CRNA  Moriah Mosher

## 2023-06-05 NOTE — ANESTHESIA POSTPROCEDURE EVALUATION
Post-Op Assessment Note    CV Status:  Stable  Pain Score: 0    Pain management: adequate     Mental Status:  Arousable and sleepy   Hydration Status:  Euvolemic and stable   PONV Controlled:  Controlled   Airway Patency:  Patent and adequate      Post Op Vitals Reviewed: Yes      Staff: CRNA         No notable events documented      BP  101/59    Temp     Pulse 60   Resp 16   SpO2 95%

## 2023-06-05 NOTE — H&P
History and Physical - [facility]  Rachellekrystian Yu 70 y o  female MRN: [id number]        HPI: 70-year-old male with history of hypertension, colon polyps and family history of colon cancer in her mother was having issues with change in bowel habits of constipation and episodes of diarrhea    Historical Information   Past Medical History:   Diagnosis Date   • Arthritis    • Cellulitis of face 10/16/2019    Bilateral nares   • Family history of colon cancer in mother    • Functional murmur    • Hypercalcemia 2016   • Hypertension     Dx'd in , controlled    • Osteoporosis    • Screening for breast cancer 2018   • SOB (shortness of breath) 2020   • Viral upper respiratory tract infection 10/30/2018     Past Surgical History:   Procedure Laterality Date   • CARDIAC SURGERY     •  SECTION      x2, [address]     • MITRAL VALVE REPAIR     • TX COLONOSCOPY FLX DX W/COLLJ SPEC WHEN PFRMD N/A 10/10/2016    Procedure: COLONOSCOPY;  Surgeon: Dajuan Ziegler MD;  Location: [facility];   Service: Gastroenterology   • TOE SURGERY Bilateral     hammer toe correction surgery   • TONSILLECTOMY      with Adenoidectomy     Social History   Social History     Substance and Sexual Activity   Alcohol Use Yes    Comment: Rarely      Social History     Substance and Sexual Activity   Drug Use No     Social History     Tobacco Use   Smoking Status Never   Smokeless Tobacco Never     Family History   Problem Relation Age of Onset   • Colon cancer Mother 68   • No Known Problems Father    • No Known Problems Sister    • No Known Problems Daughter    • No Known Problems Maternal Grandmother    • No Known Problems Maternal Grandfather    • No Known Problems Paternal Grandmother    • No Known Problems Paternal Grandfather    • No Known Problems Son    • No Known Problems Son    • No Known Problems Paternal Aunt    • No Known Problems Paternal Aunt    • No Known Problems "Paternal Aunt    • No Known Problems Paternal Aunt        Meds/Allergies     (Not in a hospital admission)      Allergies   Allergen Reactions   • Flonase [Fluticasone] Dermatitis       Objective     Blood pressure 137/53, pulse 70, temperature (!) 96 3 °F (35 7 °C), temperature source Temporal, resp  rate 15, height 4' 9\" (1 448 m), weight 71 7 kg (158 lb), SpO2 97 %, not currently breastfeeding      PHYSICAL EXAM:    Gen: NAD  CV: S1 & S2 normal, RRR  CHEST: Clear to auscultate  ABD: soft, NT/ND, good bowel sounds  EXT: no edema    ASSESSMENT:     History of colon polyps, family history colon cancer, change in bowel habits    PLAN:    Colonoscopy              "

## 2023-06-09 PROCEDURE — 88305 TISSUE EXAM BY PATHOLOGIST: CPT | Performed by: STUDENT IN AN ORGANIZED HEALTH CARE EDUCATION/TRAINING PROGRAM

## 2023-07-06 ENCOUNTER — HOSPITAL ENCOUNTER (OUTPATIENT)
Dept: RADIOLOGY | Facility: HOSPITAL | Age: 78
Discharge: HOME | End: 2023-07-06
Payer: COMMERCIAL

## 2023-07-06 DIAGNOSIS — R13.12 OROPHARYNGEAL DYSPHAGIA: ICD-10-CM

## 2023-07-06 PROCEDURE — 74240 X-RAY XM UPR GI TRC 1CNTRST: CPT

## 2023-07-14 ENCOUNTER — OFFICE VISIT (OUTPATIENT)
Dept: FAMILY MEDICINE CLINIC | Facility: CLINIC | Age: 78
End: 2023-07-14
Payer: COMMERCIAL

## 2023-07-14 VITALS
OXYGEN SATURATION: 99 % | HEART RATE: 62 BPM | DIASTOLIC BLOOD PRESSURE: 82 MMHG | BODY MASS INDEX: 33.01 KG/M2 | WEIGHT: 153 LBS | TEMPERATURE: 97.9 F | SYSTOLIC BLOOD PRESSURE: 128 MMHG | RESPIRATION RATE: 14 BRPM | HEIGHT: 57 IN

## 2023-07-14 DIAGNOSIS — M71.22 SYNOVIAL CYST OF LEFT POPLITEAL SPACE: ICD-10-CM

## 2023-07-14 DIAGNOSIS — E04.1 THYROID NODULE: ICD-10-CM

## 2023-07-14 DIAGNOSIS — Z98.890 S/P MVR (MITRAL VALVE REPAIR): Primary | ICD-10-CM

## 2023-07-14 DIAGNOSIS — I50.32 CHRONIC HEART FAILURE WITH PRESERVED EJECTION FRACTION (HCC): ICD-10-CM

## 2023-07-14 DIAGNOSIS — M79.89 FOOT SWELLING: ICD-10-CM

## 2023-07-14 DIAGNOSIS — E34.9 ELEVATED PARATHYROID HORMONE: ICD-10-CM

## 2023-07-14 DIAGNOSIS — M79.89 SWELLING OF LEFT FOOT: ICD-10-CM

## 2023-07-14 DIAGNOSIS — I83.812 VARICOSE VEINS OF LEFT LOWER EXTREMITY WITH PAIN: ICD-10-CM

## 2023-07-14 PROCEDURE — 99214 OFFICE O/P EST MOD 30 MIN: CPT | Performed by: INTERNAL MEDICINE

## 2023-07-14 NOTE — PATIENT INSTRUCTIONS
Osteoporosis Education   Osteoporosis  is a long-term medical condition that causes your bones to become weak, brittle, and more likely to fracture. Osteoporosis occurs when your body absorbs more bone than it makes. It is also caused by a lack of calcium and estrogen (female hormone). Common symptoms include the following: You may not have any signs or symptoms. You may break a bone after a muscle strain, bump, or fall. A break usually occurs in the hip, spine, or wrist. A collapsed vertebra (bone in your spine) may cause severe back pain or loss of height from bent posture. Call your doctor if:   ·  You have severe pain. ·  You have increasing pain after a fall. ·  You have pain when you do your daily activities. ·  You have questions or concerns about your condition or care. Diagnosis of osteoporosis:   · Blood and urine tests  measure your calcium, vitamin D, and estrogen levels. · An x-ray or CT may show thinned bones or a fracture. You may be given contrast liquid to help the bones show up better in the pictures. Tell the healthcare provider if you have ever had an allergic reaction to contrast liquid. Do not enter the MRI room with anything metal. Metal can cause serious injury. Tell the healthcare provider if you have any metal in or on your body. · A bone density test  compares your bone thickness with what is expected for someone of your age, gender, and ethnicity. Treatment for osteoporosis may include medicines to prevent bone loss, build new bone, and increase estrogen. These medicines help prevent fractures and may be given as a pill or injection. Ask your healthcare provider for more information on these medicines. Prevent bone loss:  · Eat healthy foods that are high in calcium. This helps keep your bones strong. Good sources of calcium are milk, cheese, broccoli, tofu, almonds, and canned salmon and sardines. Recommended to get at least 1200mg daily of calcium.   · Increase your vitamin D intake. Vitamin D is in fish oils, some vegetables, and fortified milk, cereal, and bread. Vitamin D is also formed in the skin when it is exposed to the sun. Ask your healthcare provider how much sunlight is safe for you. You will require at least 800 units of vitamin D daily taken as a supplement. · Drink liquids as directed. Ask your healthcare provider how much liquid to drink each day and which liquids are best for you. Do not have alcohol or caffeine. They decrease bone mineral density, which can weaken your bones. · Exercise regularly. Ask your healthcare provider about the best exercise plan for you. Weight bearing exercise for 30 minutes, 3 times a week can help build and strengthen bone. · Do not smoke. Nicotine and other chemicals in cigarettes and cigars can cause lung damage. Ask your healthcare provider for information if you currently smoke and need help to quit. E-cigarettes or smokeless tobacco still contain nicotine. Talk to your healthcare provider before you use these products. · Go to physical therapy as directed. A physical therapist teaches you exercises to help improve movement and muscle strength. · Alcohol. It is recommended to avoid heavy alcohol use as increased consumption of alcohol is known to cause bone loss  © Copyright [employer] 2021 Information is for End User's use only and may not be sold, redistributed or otherwise used for commercial purposes. All illustrations and images included in CareNotes® are the copyrighted property of [employer] or [address] [place] [address]    Calcium and vitamin D for bone health (Beyond the Basics)    CALCIUM AND VITAMIN D OVERVIEW  Osteoporosis is a common bone disorder that causes a progressive loss in bone density and mass. As a result, bones become thin, weakened, and easily fractured.  It is estimated that more than 1.3 million osteoporosis-associated (or "osteoporotic") fractures occur every year in the [place], primarily of bone within the spine (the vertebrae), the hip, and the forearm near the wrist. =    A number of treatments can help to prevent loss of bone and treat low bone mass. However, the first step in preventing or treating osteoporosis is to consume foods and drinks that provide calcium, a mineral essential for bone strength, and vitamin D, which aids in calcium breakdown and absorption. =    CALCIUM AND VITAMIN D BENEFITS  Good nutrition is important at all ages to keep the bones healthy. · Taking calcium reduces bone loss and decreases the risk of fracturing the vertebrae (the bones that surround the spinal cord). · Consuming calcium during childhood (eg, in milk) can lead to higher bone mass in adulthood. This increase in bone density can reduce the risk of fractures later in life. · Calcium may also have benefits in other body systems by reducing blood pressure and cholesterol levels. · Calcium and vitamin D supplements may help prevent tooth loss in older adults. RECOMMENDATIONS FOR CALCIUM    General recommendations -- Premenopausal women and men should consume at least 1000 mg of calcium, while postmenopausal women should consume 1200 mg (total diet plus supplement). You should not consume more than 2000 mg of calcium per day (total diet plus supplement) due to the risk of side effects. Calcium in the diet -- The primary sources of calcium in the diet include milk and other dairy products, such as hard cheese, cottage cheese, or yogurt, as well as green vegetables, such as kale and broccoli (table 1). Some cereals, soy products, and fruit juices are fortified with calcium. Calcium supplements -- The body is able to absorb calcium contained in supplements as well as from dietary sources. If it is not possible to get enough calcium from dietary sources, consult a health care provider to determine the best type, dose, and timing of calcium supplements.      · Calcium carbonate is effective and is the least expensive form of calcium. It is best absorbed with a low-iron meal (such as breakfast). Calcium carbonate may not be absorbed well in people who also take a specific medication for gastroesophageal reflux (called a proton pump inhibitor or H2 blocker), which blocks stomach acid. · Many natural calcium carbonate preparations such as oyster shells or bone meal contain some lead. · Calcium citrate is well absorbed in the fasting state as well as with a meal.  · Calcium doses above 500 mg are not absorbed as well as smaller doses, so large doses of supplements should be taken in divided doses (eg, in the morning and evening). · Calcium supplements do not replace other osteoporosis treatments such as hormone replacement, bisphosphonates (eg, risedronate [sample brand name: Actonel] and alendronate [brand name: Fosamax]), and raloxifene (brand name: Evista). Calcium and vitamin D supplements alone are usually insufficient to prevent age-related bone loss, although they may be beneficial in some subgroups (older adults, those with very low intake). In most patients with or at risk for osteoporosis, the addition of medication or hormonal therapy is necessary in order to slow the breakdown and removal of bone (ie, resorption). Underlying gastrointestinal diseases -- Patients who do not adequately absorb nutrients from the gastrointestinal tract (due to malabsorption) may require more than 1000 to 1200 mg of calcium per day. In such cases, a health care provider can help to determine the optimal dose of calcium. Medications -- All medications should be discussed with a health care provider to ensure that possible interactions with calcium are identified. Certain medications change the amount of calcium that is absorbed and/or excreted. As an example, loop diuretics (eg, furosemide [sample brand name: Lasix]) increase the amount of calcium excreted in the urine.     On the other hand, thiazide diuretics (eg, hydrochlorothiazide) can reduce levels of calcium in the urine, potentially reducing the risk of bone loss and kidney stones. Side effects of calcium -- Calcium is usually easily tolerated when it is taken in divided doses several times per day. Some people experience side effects related to calcium, including constipation and indigestion. Calcium supplements interfere with the absorption of iron and thyroid hormone, and therefore, these medications should be taken at different times. Kidney stones -- There is no evidence that consuming large amounts of calcium (from foods and drinks) increases the risk of kidney stones, or that avoiding dietary calcium decreases the risk. In fact, avoiding dairy products is likely to increase the risk of kidney stones. However, use of calcium supplements may increase the risk of kidney stones in susceptible individuals by raising the level of calcium in the urine. This is particularly true if the supplement is taken between meals or at bedtime, and this is one of the reasons we prefer for patients to get as much of their calcium requirement through dietary means. IMPORTANCE OF VITAMIN D  Vitamin D decreases bone loss and lowers the risk of fracture, especially in older men and women. Along with calcium, vitamin D also helps to prevent and treat osteoporosis. To absorb calcium efficiently, an adequate amount of vitamin D must be present. Vitamin D is normally made in the skin after exposure to sunlight. Recommendations for vitamin D -- The current recommendation is that men over 70 years and postmenopausal women consume at least 800 international units (20 micrograms) of vitamin D per day. Lower levels of vitamin D are not as effective, while high doses can be toxic, especially if taken for long periods of time.  Although the optimal intake has not been clearly established in premenopausal women or in younger men with osteoporosis, 600 international units (15 micrograms) of vitamin D daily is generally suggested. Vitamin D is available as an individual supplement and is included in most multivitamins and some calcium supplements (table 2). Milk is a relatively good dietary source of vitamin D, with approximately 100 international units (2.5 micrograms) per cup (240 mL), and salmon has 800 to 1000 international units (20 to 25 micrograms) of vitamin D per serving. Most healthy people don't need to check with their health care provider before taking standard doses of vitamin D and don't need monitoring of vitamin D levels if they are not being treated for vitamin D deficiency. However, recommendations for vitamin D supplementation may be different in people with certain underlying medical conditions, such as sarcoidosis or lymphoma. In these situations, a provider will determine if supplements are needed; if so, the person's vitamin D and calcium levels should be monitored with regular tests. ©0248 [facility], [address] [facility] rights reserved.

## 2023-07-14 NOTE — LETTER
July 14, 2023     Patient: Leana Loaiza  YOB: 1945  Date of Visit: 7/14/2023      To Whom it May Concern:    Diane Almanza is under my professional care. Rachelle was seen in my office on 7/14/2023. Rachelle is to remain out of work pending reevaluation on 8/1/2023. If you have any questions or concerns, please don't hesitate to call.          Sincerely,          Ugo Altamirano MD        CC: No Recipients

## 2023-07-14 NOTE — PROGRESS NOTES
BMI Counseling: Body mass index is 33.11 kg/m². The BMI is above normal. Nutrition recommendations include 3-5 servings of fruits/vegetables daily.

## 2023-07-14 NOTE — PROGRESS NOTES
Name: Mile Mckee      : 1945      MRN: [id number]  Encounter Provider: Danae Rangel MD  Encounter Date: 2023   Encounter department: [address] [place] [address]. S/P MVR (mitral valve repair)  -     UA (URINE) with reflex to Scope; Future; Expected date: 2023  -     Comprehensive metabolic panel; Future  -     CBC and differential; Future  -     Magnesium; Future  -     Lipid panel; Future    2. Elevated parathyroid hormone  -     UA (URINE) with reflex to Scope; Future; Expected date: 2023  -     Comprehensive metabolic panel; Future  -     CBC and differential; Future  -     Magnesium; Future  -     Lipid panel; Future    3. Varicose veins of left lower extremity with pain    4. Synovial cyst of left popliteal space  -     Ambulatory Referral to Orthopedic Surgery; Future    5. Swelling of left foot  -     Diclofenac Sodium (VOLTAREN) 1 %; Apply 2 g topically 4 (four) times a day  -     XR foot 3+ vw left; Future; Expected date: 2023  -     Ambulatory Referral to Podiatry; Future    6. Thyroid nodule  -     UA (URINE) with reflex to Scope; Future; Expected date: 2023  -     Comprehensive metabolic panel; Future  -     CBC and differential; Future  -     Magnesium; Future  -     Lipid panel; Future    7. Foot swelling  -     Diclofenac Sodium (VOLTAREN) 1 %; Apply 2 g topically 4 (four) times a day  -     XR foot 3+ vw left; Future; Expected date: 2023  -     Ambulatory Referral to Podiatry; Future    8. Chronic heart failure with preserved ejection fraction (HCC)  -     UA (URINE) with reflex to Scope; Future; Expected date: 2023  -     Comprehensive metabolic panel; Future  -     CBC and differential; Future  -     Magnesium; Future  -     Lipid panel; Future     moist Heat  Home P.T.   Life style mod  RTC in 3 mos w Blood work       Subjective      [address] is here for Regular check up, she has few symptoms, recent blood work and med list reviewed w pt in Detail. .. Review of Systems   Constitutional: Negative for chills, fatigue and fever. HENT: Positive for postnasal drip. Negative for congestion, facial swelling, sore throat, trouble swallowing and voice change. Eyes: Negative for pain, discharge and visual disturbance. Respiratory: Negative for cough, shortness of breath and wheezing. Cardiovascular: Negative for chest pain, palpitations and leg swelling. Gastrointestinal: Negative for abdominal pain, blood in stool, constipation, diarrhea and nausea. Endocrine: Negative for polydipsia, polyphagia and polyuria. Genitourinary: Negative for difficulty urinating, hematuria and urgency. Musculoskeletal: Positive for arthralgias, gait problem and joint swelling. Negative for myalgias. Skin: Negative for rash. Neurological: Negative for dizziness, tremors, weakness and headaches. Hematological: Negative for adenopathy. Does not bruise/bleed easily. Psychiatric/Behavioral: Negative for dysphoric mood, sleep disturbance and suicidal ideas. Current Outpatient Medications on File Prior to Visit   Medication Sig   • aspirin (ECOTRIN LOW STRENGTH) 81 mg EC tablet Take 1 tablet (81 mg total) by mouth daily   • Coenzyme Q10 10 MG capsule Take 1 capsule (10 mg total) by mouth daily   • losartan (COZAAR) 50 mg tablet Take 1 tablet (50 mg total) by mouth daily In the Morning. .   • metoprolol succinate (TOPROL-XL) 50 mg 24 hr tablet Take 1 tablet (50 mg total) by mouth daily In the evening daily   • torsemide (DEMADEX) 10 mg tablet Take 1 tablet (10 mg total) by mouth daily   • atorvastatin (LIPITOR) 20 mg tablet Take 1 tablet (20 mg total) by mouth daily   • [DISCONTINUED] buPROPion (WELLBUTRIN) 75 mg tablet Take 1 tablet (75 mg total) by mouth 2 (two) times a day       Objective     /82 (BP Location: Left arm, Patient Position: Sitting, Cuff Size: Standard)   Pulse 62   Temp 97.9 °F (36.6 °C) (Tympanic)   Resp 14   Ht 4' 9" (1.448 m)   Wt 69.4 kg (153 lb)   SpO2 99%   BMI 33.11 kg/m²     Physical Exam  Constitutional:       General: She is not in acute distress. HENT:      Head: Normocephalic. Mouth/Throat:      Pharynx: No oropharyngeal exudate. Eyes:      General: No scleral icterus. Conjunctiva/sclera: Conjunctivae normal.      Pupils: Pupils are equal, round, and reactive to light. Neck:      Thyroid: No thyromegaly. Cardiovascular:      Rate and Rhythm: Normal rate and regular rhythm. Heart sounds: Normal heart sounds. No murmur heard. Pulmonary:      Effort: Pulmonary effort is normal. No respiratory distress. Breath sounds: Normal breath sounds. No wheezing or rales. Abdominal:      General: Bowel sounds are normal. There is no distension. Palpations: Abdomen is soft. Tenderness: There is no abdominal tenderness. There is no guarding or rebound. Musculoskeletal:         General: Swelling and tenderness present. Cervical back: Neck supple. Lymphadenopathy:      Cervical: No cervical adenopathy. Skin:     Coloration: Skin is not pale. Findings: No rash. Neurological:      Mental Status: She is alert and oriented to person, place, and time. Sensory: No sensory deficit. Motor: No weakness.        Isidro Dee MD

## 2023-07-15 ENCOUNTER — APPOINTMENT (OUTPATIENT)
Dept: LAB | Facility: CLINIC | Age: 78
End: 2023-07-15
Payer: COMMERCIAL

## 2023-07-15 DIAGNOSIS — R80.8 OTHER PROTEINURIA: ICD-10-CM

## 2023-07-15 DIAGNOSIS — E04.1 THYROID NODULE: ICD-10-CM

## 2023-07-15 DIAGNOSIS — I50.32 CHRONIC HEART FAILURE WITH PRESERVED EJECTION FRACTION (HCC): ICD-10-CM

## 2023-07-15 DIAGNOSIS — E34.9 ELEVATED PARATHYROID HORMONE: ICD-10-CM

## 2023-07-15 DIAGNOSIS — Z98.890 S/P MVR (MITRAL VALVE REPAIR): ICD-10-CM

## 2023-07-15 DIAGNOSIS — R31.9 HEMATURIA, UNSPECIFIED TYPE: Primary | ICD-10-CM

## 2023-07-15 LAB
ALBUMIN SERPL BCP-MCNC: 3.7 G/DL (ref 3.5–5)
ALP SERPL-CCNC: 79 U/L (ref 34–104)
ALT SERPL W P-5'-P-CCNC: 11 U/L (ref 7–52)
ANION GAP SERPL CALCULATED.3IONS-SCNC: 6 MMOL/L
AST SERPL W P-5'-P-CCNC: 12 U/L (ref 13–39)
BACTERIA UR QL AUTO: ABNORMAL /HPF
BASOPHILS # BLD AUTO: 0.04 THOUSANDS/ÂΜL (ref 0–0.1)
BASOPHILS NFR BLD AUTO: 1 % (ref 0–1)
BILIRUB SERPL-MCNC: 0.65 MG/DL (ref 0.2–1)
BILIRUB UR QL STRIP: NEGATIVE
BUN SERPL-MCNC: 27 MG/DL (ref 5–25)
CALCIUM SERPL-MCNC: 10.1 MG/DL (ref 8.4–10.2)
CHLORIDE SERPL-SCNC: 110 MMOL/L (ref 96–108)
CHOLEST SERPL-MCNC: 201 MG/DL
CLARITY UR: ABNORMAL
CO2 SERPL-SCNC: 23 MMOL/L (ref 21–32)
COLOR UR: YELLOW
CREAT SERPL-MCNC: 0.95 MG/DL (ref 0.6–1.3)
EOSINOPHIL # BLD AUTO: 0.24 THOUSAND/ÂΜL (ref 0–0.61)
EOSINOPHIL NFR BLD AUTO: 4 % (ref 0–6)
ERYTHROCYTE [DISTWIDTH] IN BLOOD BY AUTOMATED COUNT: 12.7 % (ref 11.6–15.1)
GFR SERPL CREATININE-BSD FRML MDRD: 58 ML/MIN/1.73SQ M
GLUCOSE P FAST SERPL-MCNC: 96 MG/DL (ref 65–99)
GLUCOSE UR STRIP-MCNC: NEGATIVE MG/DL
HCT VFR BLD AUTO: 40.7 % (ref 34.8–46.1)
HDLC SERPL-MCNC: 63 MG/DL
HGB BLD-MCNC: 13.3 G/DL (ref 11.5–15.4)
HGB UR QL STRIP.AUTO: NEGATIVE
IMM GRANULOCYTES # BLD AUTO: 0.02 THOUSAND/UL (ref 0–0.2)
IMM GRANULOCYTES NFR BLD AUTO: 0 % (ref 0–2)
KETONES UR STRIP-MCNC: NEGATIVE MG/DL
LDLC SERPL CALC-MCNC: 107 MG/DL (ref 0–100)
LEUKOCYTE ESTERASE UR QL STRIP: ABNORMAL
LYMPHOCYTES # BLD AUTO: 2.51 THOUSANDS/ÂΜL (ref 0.6–4.47)
LYMPHOCYTES NFR BLD AUTO: 37 % (ref 14–44)
MAGNESIUM SERPL-MCNC: 2.2 MG/DL (ref 1.9–2.7)
MCH RBC QN AUTO: 32.6 PG (ref 26.8–34.3)
MCHC RBC AUTO-ENTMCNC: 32.7 G/DL (ref 31.4–37.4)
MCV RBC AUTO: 100 FL (ref 82–98)
MONOCYTES # BLD AUTO: 0.6 THOUSAND/ÂΜL (ref 0.17–1.22)
MONOCYTES NFR BLD AUTO: 9 % (ref 4–12)
NEUTROPHILS # BLD AUTO: 3.44 THOUSANDS/ÂΜL (ref 1.85–7.62)
NEUTS SEG NFR BLD AUTO: 49 % (ref 43–75)
NITRITE UR QL STRIP: POSITIVE
NON-SQ EPI CELLS URNS QL MICRO: ABNORMAL /HPF
NONHDLC SERPL-MCNC: 138 MG/DL
NRBC BLD AUTO-RTO: 0 /100 WBCS
PH UR STRIP.AUTO: 5.5 [PH]
PLATELET # BLD AUTO: 247 THOUSANDS/UL (ref 149–390)
PMV BLD AUTO: 9.7 FL (ref 8.9–12.7)
POTASSIUM SERPL-SCNC: 4.2 MMOL/L (ref 3.5–5.3)
PROT SERPL-MCNC: 6.5 G/DL (ref 6.4–8.4)
PROT UR STRIP-MCNC: ABNORMAL MG/DL
RBC # BLD AUTO: 4.08 MILLION/UL (ref 3.81–5.12)
RBC #/AREA URNS AUTO: ABNORMAL /HPF
SODIUM SERPL-SCNC: 139 MMOL/L (ref 135–147)
SP GR UR STRIP.AUTO: 1.02 (ref 1–1.03)
TRIGL SERPL-MCNC: 157 MG/DL
UROBILINOGEN UR STRIP-ACNC: <2 MG/DL
WBC # BLD AUTO: 6.85 THOUSAND/UL (ref 4.31–10.16)
WBC #/AREA URNS AUTO: ABNORMAL /HPF
WBC CLUMPS # UR AUTO: PRESENT /UL

## 2023-07-15 PROCEDURE — 85025 COMPLETE CBC W/AUTO DIFF WBC: CPT

## 2023-07-15 PROCEDURE — 80053 COMPREHEN METABOLIC PANEL: CPT

## 2023-07-15 PROCEDURE — 81001 URINALYSIS AUTO W/SCOPE: CPT

## 2023-07-15 PROCEDURE — 80061 LIPID PANEL: CPT

## 2023-07-15 PROCEDURE — 83735 ASSAY OF MAGNESIUM: CPT

## 2023-07-15 PROCEDURE — 36415 COLL VENOUS BLD VENIPUNCTURE: CPT

## 2023-07-15 RX ORDER — CIPROFLOXACIN 250 MG/1
250 TABLET, FILM COATED ORAL EVERY 12 HOURS SCHEDULED
Qty: 14 TABLET | Refills: 0 | Status: SHIPPED | OUTPATIENT
Start: 2023-07-15 | End: 2023-07-22

## 2023-07-20 ENCOUNTER — OFFICE VISIT (OUTPATIENT)
Dept: OBGYN CLINIC | Facility: CLINIC | Age: 78
End: 2023-07-20
Payer: COMMERCIAL

## 2023-07-20 ENCOUNTER — APPOINTMENT (OUTPATIENT)
Dept: RADIOLOGY | Facility: AMBULARY SURGERY CENTER | Age: 78
End: 2023-07-20
Attending: STUDENT IN AN ORGANIZED HEALTH CARE EDUCATION/TRAINING PROGRAM
Payer: COMMERCIAL

## 2023-07-20 VITALS
BODY MASS INDEX: 33.01 KG/M2 | SYSTOLIC BLOOD PRESSURE: 147 MMHG | HEART RATE: 72 BPM | WEIGHT: 153 LBS | HEIGHT: 57 IN | DIASTOLIC BLOOD PRESSURE: 73 MMHG

## 2023-07-20 DIAGNOSIS — M25.572 PAIN, JOINT, ANKLE AND FOOT, LEFT: ICD-10-CM

## 2023-07-20 DIAGNOSIS — M17.12 PRIMARY OSTEOARTHRITIS OF LEFT KNEE: Primary | ICD-10-CM

## 2023-07-20 DIAGNOSIS — Q66.72 PES CAVUS OF BOTH FEET: ICD-10-CM

## 2023-07-20 DIAGNOSIS — M25.562 CHRONIC PAIN OF LEFT KNEE: ICD-10-CM

## 2023-07-20 DIAGNOSIS — M19.079 ARTHRITIS OF MIDFOOT: ICD-10-CM

## 2023-07-20 DIAGNOSIS — M71.22 SYNOVIAL CYST OF LEFT POPLITEAL SPACE: ICD-10-CM

## 2023-07-20 DIAGNOSIS — Q66.71 PES CAVUS OF BOTH FEET: ICD-10-CM

## 2023-07-20 DIAGNOSIS — G89.29 CHRONIC PAIN OF LEFT KNEE: ICD-10-CM

## 2023-07-20 DIAGNOSIS — M25.562 LEFT KNEE PAIN, UNSPECIFIED CHRONICITY: ICD-10-CM

## 2023-07-20 PROCEDURE — 73610 X-RAY EXAM OF ANKLE: CPT

## 2023-07-20 PROCEDURE — 73562 X-RAY EXAM OF KNEE 3: CPT

## 2023-07-20 PROCEDURE — 99204 OFFICE O/P NEW MOD 45 MIN: CPT | Performed by: STUDENT IN AN ORGANIZED HEALTH CARE EDUCATION/TRAINING PROGRAM

## 2023-07-20 PROCEDURE — 20610 DRAIN/INJ JOINT/BURSA W/O US: CPT | Performed by: STUDENT IN AN ORGANIZED HEALTH CARE EDUCATION/TRAINING PROGRAM

## 2023-07-20 RX ORDER — BUPIVACAINE HYDROCHLORIDE 5 MG/ML
3.5 INJECTION, SOLUTION PERINEURAL
Status: COMPLETED | OUTPATIENT
Start: 2023-07-20 | End: 2023-07-20

## 2023-07-20 RX ORDER — METHYLPREDNISOLONE ACETATE 40 MG/ML
1 INJECTION, SUSPENSION INTRA-ARTICULAR; INTRALESIONAL; INTRAMUSCULAR; SOFT TISSUE
Status: COMPLETED | OUTPATIENT
Start: 2023-07-20 | End: 2023-07-20

## 2023-07-20 RX ADMIN — BUPIVACAINE HYDROCHLORIDE 3.5 ML: 5 INJECTION, SOLUTION PERINEURAL at 15:00

## 2023-07-20 RX ADMIN — METHYLPREDNISOLONE ACETATE 1 ML: 40 INJECTION, SUSPENSION INTRA-ARTICULAR; INTRALESIONAL; INTRAMUSCULAR; SOFT TISSUE at 15:00

## 2023-07-20 NOTE — PROGRESS NOTES
Orthopaedics Office Visit - New Patient Visit    ASSESSMENT/PLAN:    Assessment:   1. Left tricompartmental primary osteoarthritis of the knee  2. 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 Plan:   · Reviewed x-rays demonstrating degenerative change of the left knee and left midfoot. There is also clinical and radiographic evidence of pes cavus. · We discussed surgical intervention for her foot and she would like to avoid this if possible. We will continue with conservative treatment with anti-inflammatories and topical anti-inflammatories for the left foot. Also recommended high arch shoe and supportive shoe wear  · Continue use of Voltaren gel for the midfoot  · Discussion was had with the patient about conservative management options for left knee osteoarthritis. Patient has not tried conservative treatments. We discussed that this would consist of anti-inflammatory medications both topical and oral, corticosteroid injections, possible viscosupplementation. We also discussed that these do not solve the issue and the only way to get rid of the osteoarthritis would be a total knee replacement. CSI for left knee offered and accepted today. Performed as documented below without issue or complication. Discussed indications for graduating to viscosupplementation series. She will call the office to initiate an order for the injection series if she does not respond to the injection provided today  · Activity to tolerance  · Follow up PRN    To Do Next Visit:  Re-evaluate, possible viscosupplementation left knee    _____________________________________________________  CHIEF COMPLAINT:  Chief Complaint   Patient presents with   • Left Knee - Pain   • Left Ankle - Pain         SUBJECTIVE:  Namita Quispe is a 70 y.o. female who presents today for initial evaluation of left knee and ankle pain and swelling.   She reports gradual onset of these symptoms and that this has been worsening since her [facility] last year. She describes diffuse pain about the left knee and left ankle. Her pain increases with her level of activity. She also complains of diffuse swelling about her left lower extremity. She was recently evaluated by a vascular surgeon who recommended compression stockings for her. She notes a prominent lump about the dorsal aspect of her foot and ankle. This lump also seems to increase with her swelling. She has been using Voltaren gel which provides some benefit for her. She complains of startup stiffness in the foot and knee. She denies any recent injury or trauma. PAST MEDICAL HISTORY:  Past Medical History:   Diagnosis Date   • Arthritis    • Cellulitis of face 10/16/2019    Bilateral nares   • Family history of colon cancer in mother    • Functional murmur    • Hypercalcemia 2016   • Hypertension     Dx'd in , controlled    • Osteoporosis    • Screening for breast cancer 2018   • SOB (shortness of breath) 2020   • Viral upper respiratory tract infection 10/30/2018       PAST SURGICAL HISTORY:  Past Surgical History:   Procedure Laterality Date   • CARDIAC SURGERY     •  SECTION      x2, [facility]     • MITRAL VALVE REPAIR     • DC COLONOSCOPY FLX DX W/COLLJ SPEC WHEN PFRMD N/A 10/10/2016    Procedure: COLONOSCOPY;  Surgeon: Corrinne Peacock, MD;  Location: [facility];   Service: Gastroenterology   • TOE SURGERY Bilateral     hammer toe correction surgery   • TONSILLECTOMY      with Adenoidectomy       FAMILY HISTORY:  Family History   Problem Relation Age of Onset   • Colon cancer Mother 68   • No Known Problems Father    • No Known Problems Sister    • No Known Problems Daughter    • No Known Problems Maternal Grandmother    • No Known Problems Maternal Grandfather    • No Known Problems Paternal Grandmother    • No Known Problems Paternal Grandfather • No Known Problems Son    • No Known Problems Son    • No Known Problems Paternal Aunt    • No Known Problems Paternal Aunt    • No Known Problems Paternal Aunt    • No Known Problems Paternal Aunt        SOCIAL HISTORY:  Social History     Tobacco Use   • Smoking status: Never   • Smokeless tobacco: Never   Vaping Use   • Vaping Use: Never used   Substance Use Topics   • Alcohol use: Yes     Comment: Rarely    • Drug use: No       MEDICATIONS:    Current Outpatient Medications:   •  aspirin (ECOTRIN LOW STRENGTH) 81 mg EC tablet, Take 1 tablet (81 mg total) by mouth daily, Disp: 90 tablet, Rfl: 3  •  atorvastatin (LIPITOR) 20 mg tablet, Take 1 tablet (20 mg total) by mouth daily, Disp: 90 tablet, Rfl: 3  •  ciprofloxacin (CIPRO) 250 mg tablet, Take 1 tablet (250 mg total) by mouth every 12 (twelve) hours for 7 days, Disp: 14 tablet, Rfl: 0  •  Coenzyme Q10 10 MG capsule, Take 1 capsule (10 mg total) by mouth daily, Disp: 90 capsule, Rfl: 3  •  Diclofenac Sodium (VOLTAREN) 1 %, Apply 2 g topically 4 (four) times a day, Disp: 200 g, Rfl: 3  •  losartan (COZAAR) 50 mg tablet, Take 1 tablet (50 mg total) by mouth daily In the Morning. ., Disp: 90 tablet, Rfl: 3  •  metoprolol succinate (TOPROL-XL) 50 mg 24 hr tablet, Take 1 tablet (50 mg total) by mouth daily In the evening daily, Disp: 90 tablet, Rfl: 3  •  torsemide (DEMADEX) 10 mg tablet, Take 1 tablet (10 mg total) by mouth daily, Disp: 90 tablet, Rfl: 3    ALLERGIES:  Allergies   Allergen Reactions   • Flonase [Fluticasone] Dermatitis       REVIEW OF SYSTEMS:  MSK: See HPI  Neuro: See HPI  Pertinent items are otherwise noted in HPI. A comprehensive review of systems was otherwise negative.     LABS:  HgA1c:   Lab Results   Component Value Date    HGBA1C 5.6 04/06/2023     BMP:   Lab Results   Component Value Date    CALCIUM 10.1 07/15/2023    K 4.2 07/15/2023    CO2 23 07/15/2023     (H) 07/15/2023    BUN 27 (H) 07/15/2023    CREATININE 0.95 07/15/2023 CBC: No components found for: "CBC"    _____________________________________________________  PHYSICAL EXAMINATION:  Vital signs: /73 (BP Location: Right arm, Patient Position: Sitting, Cuff Size: Adult)   Pulse 72   Ht 4' 9" (1.448 m)   Wt 69.4 kg (153 lb)   BMI 33.11 kg/m²   General: No acute distress, awake and alert  Psychiatric: Mood and affect appear appropriate  HEENT: Trachea Midline, No torticollis, no apparent facial trauma  Cardiovascular: No audible murmurs; Extremities appear perfused  Pulmonary: No audible wheezing or stridor  Skin: No open lesions; see further details (if any) below    MUSCULOSKELETAL EXAMINATION:  Extremities:    Left ankle: The left ankle was exposed inspected. The skin overlying the left ankle is intact. The patient has a cavovarus deformity when upright. The patient has a large dorsal osteophyte complex in the tarsometatarsal joints of the first tarsometatarsal and medial cuneiform which is tender to palpation. Some tenderness over the distal aspect of the fibula. Some distal tenderness just distal to the medial malleolus. Patient's deformity is rigid without being able to be passively corrected to neutral.  No gross instability or laxity. Valgus deformity of first metatarsal phalangeal joint   Left knee: The left knee was exposed inspected. The patient's skin overlying the left knee is intact. No previous surgical incisions. The patient is able to range from 0 to 120 degrees of flexion with crepitus throughout range of motion. Patient has no tenderness over the extensor mechanism. She has medial and lateral joint line tenderness with medial being more significant. The patient has no varus or valgus instability at 0 or 30 degrees of flexion. The patient has negative anterior and posterior drawer tests. Patient is able to maintain straight leg raise.   The patient's sensation is intact to light touch in the superficial peroneal, deep peroneal, sural, saphenous, plantar nerve distributions. Tibialis anterior, extensor hallux longus, gastrocnemius muscles intact. +2 dorsalis pedis and posterior tibial pulses. _____________________________________________________  STUDIES REVIEWED:  I personally reviewed the images and interpretation is as follows:  X-ray of the left knee obtained 7/20/2023 reviewed demonstrating moderate degenerative change. There is tricompartmental sclerosis and osteophytosis. There is lateral compartment chondrocalcinosis noted. There is no acute fracture, dislocation, lytic or blastic lesion. X-ray left ankle obtained 7/20/2023 reviewed demonstrating no acute fracture, dislocation, lytic or blastic lesion. There is a large inferior calcaneal osteophyte noted. There is degenerative change in the midfoo most prominent in the tarsal metatarsal joint the first digit. .  The mortise is well-maintained and congruent. PROCEDURES PERFORMED:  Large joint arthrocentesis: L knee  Universal Protocol:  Consent: Verbal consent obtained.   Risks and benefits: risks, benefits and alternatives were discussed  Consent given by: patient  Patient understanding: patient states understanding of the procedure being performed  Site marked: the operative site was marked  Patient identity confirmed: verbally with patient    Supporting Documentation  Indications: pain   Procedure Details  Location: knee - L knee  Preparation: Patient was prepped and draped in the usual sterile fashion  Needle size: 22 G  Ultrasound guidance: no  Approach: anterolateral  Medications administered: 1 mL methylPREDNISolone acetate 40 mg/mL; 3.5 mL bupivacaine 0.5 %    Patient tolerance: patient tolerated the procedure well with no immediate complications  Dressing:  Sterile dressing applied          Deana Rogers  Scribe Attestation    I,:  Deana Rogers am acting as a scribe while in the presence of the attending physician.:       I,:  Devin Higginbotham, DO personally performed the services described in this documentation    as scribed in my presence.:

## 2023-07-21 ENCOUNTER — HOSPITAL ENCOUNTER (OUTPATIENT)
Dept: ULTRASOUND IMAGING | Facility: HOSPITAL | Age: 78
Discharge: HOME | End: 2023-07-21
Payer: COMMERCIAL

## 2023-07-21 DIAGNOSIS — R80.8 OTHER PROTEINURIA: ICD-10-CM

## 2023-07-21 DIAGNOSIS — R31.9 HEMATURIA, UNSPECIFIED TYPE: ICD-10-CM

## 2023-07-21 PROCEDURE — 76770 US EXAM ABDO BACK WALL COMP: CPT

## 2023-07-27 ENCOUNTER — OFFICE VISIT (OUTPATIENT)
Dept: ENDOCRINOLOGY | Facility: CLINIC | Age: 78
End: 2023-07-27
Payer: COMMERCIAL

## 2023-07-27 VITALS
OXYGEN SATURATION: 98 % | DIASTOLIC BLOOD PRESSURE: 78 MMHG | BODY MASS INDEX: 33.91 KG/M2 | HEIGHT: 57 IN | WEIGHT: 157.2 LBS | HEART RATE: 68 BPM | SYSTOLIC BLOOD PRESSURE: 124 MMHG

## 2023-07-27 DIAGNOSIS — E04.1 THYROID NODULE: ICD-10-CM

## 2023-07-27 DIAGNOSIS — I10 BENIGN ESSENTIAL HYPERTENSION: ICD-10-CM

## 2023-07-27 DIAGNOSIS — E55.9 VITAMIN D INSUFFICIENCY: ICD-10-CM

## 2023-07-27 DIAGNOSIS — E34.9 ELEVATED PARATHYROID HORMONE: Primary | ICD-10-CM

## 2023-07-27 DIAGNOSIS — E83.52 HYPERCALCEMIA: ICD-10-CM

## 2023-07-27 DIAGNOSIS — M81.8 OTHER OSTEOPOROSIS WITHOUT CURRENT PATHOLOGICAL FRACTURE: ICD-10-CM

## 2023-07-27 PROCEDURE — 99205 OFFICE O/P NEW HI 60 MIN: CPT | Performed by: INTERNAL MEDICINE

## 2023-07-27 RX ORDER — ALENDRONATE SODIUM 70 MG/1
70 TABLET ORAL
Qty: 4 TABLET | Refills: 6 | Status: SHIPPED | OUTPATIENT
Start: 2023-07-27 | End: 2023-07-27 | Stop reason: ALTCHOICE

## 2023-07-27 NOTE — PATIENT INSTRUCTIONS
Core Strengthening Exercises   WHAT YOU NEED TO KNOW:   Your core includes the muscles of your lower back, hip, pelvis, and abdomen. Core strengthening exercises help heal and strengthen these muscles. This helps prevent another injury, and keeps your pelvis, spine, and hips in the correct position. DISCHARGE INSTRUCTIONS:   Call your doctor or physical therapist if:   You have sharp or worsening pain during exercise or at rest.    You have questions or concerns about your shoulder exercises. Safety tips:  Talk to your healthcare provider before you start an exercise program. A physical therapist can teach you how to do core strengthening exercises safely. Do the exercises on a mat or firm surface. A firm surface will support your spine and prevent low back pain. Do not do these exercises on a bed. Move slowly and smoothly. Avoid fast or jerky motions. Stop if you feel pain. You may feel some discomfort at first, but you should not feel pain. Tell your provider or physical therapist if you have pain while you exercise. Regular exercise will help decrease your discomfort over time. Breathe normally during core exercises. Do not hold your breath. This may cause an increase in blood pressure and prevent muscle strengthening. Your healthcare provider will tell you when to inhale and exhale during the exercise. Begin all of your exercises with abdominal bracing. Abdominal bracing helps warm up your core muscles. You can also practice abdominal bracing throughout the day. Lie on your back with your knees bent and feet flat on the floor. Place your arms in a relaxed position beside your body. Tighten your abdominal muscles. Pull your belly button in and up toward your spine. Hold for 5 seconds. Relax your muscles. Repeat 10 times. Core strengthening exercises: Your healthcare provider will tell you how often to do these exercises.  The provider will also tell you how many repetitions of each exercise you should do. Hold each exercise for 5 seconds or as directed. As you get stronger, increase your hold to 10 to 15 seconds. You can do some of these exercises on a stability ball, or with a weight. Ask your healthcare provider how to use a stability ball or weight for these exercises:  Bridging:  Lie on your back with your knees bent and feet flat on the floor. Rest your arms at your side. Tighten your buttocks, and then lift your hips 1 inch off the floor. Hold for 5 seconds. When you can do this exercise without pain for 10 seconds, increase the distance you lift your hips. A good goal is to be able to lift your hips so that your shoulders, hips, and knees are in a straight line. Dead bug:  Lie on your back with your knees bent and feet flat on the floor. Place your arms in a relaxed position beside your body. Begin with abdominal bracing. Next, raise one leg, keeping your knee bent. Hold for 5 seconds. Repeat with the other leg. When you can do this exercise without pain for 10 to 15 seconds, you may raise one straight leg and hold. Repeat with the other leg. Quadruped:  Place your hands and knees on the floor. Keep your wrists directly below your shoulders and your knees directly below your hips. Pull your belly button in toward your spine. Do not flatten or arch your back. Tighten your abdominal muscles below your belly button. Hold for 5 seconds. When you can do this exercise without pain for 10 to 15 seconds, you may extend one arm and hold. Repeat on the other side. Side bridge exercises:      Standing side bridge:  Stand next to a wall and extend one arm toward the wall. Place your palm flat on the wall with your fingers pointing upward. Begin with abdominal bracing. Next, without moving your feet, slowly bend your arm to 90 degrees. Hold for 5 seconds. Repeat on the other side.  When you can do this exercise without pain for 10 to 15 seconds, you may do the bent leg side bridge on the floor. Bent leg side bridge:  Lie on one side with your legs, hips, and shoulders in a straight line. Prop yourself up onto your forearm so your elbow is directly below your shoulder. Bend your knees back to 90 degrees. Begin with abdominal bracing. Next, lift your hips and balance yourself on your forearm and knees. Hold for 5 seconds. Repeat on the other side. When you can do this exercise without pain for 10 to 15 seconds, you may do the straight leg side bridge on the floor. Straight leg side bridge:  Lie on one side with your legs, hips, and shoulders in a straight line. Prop yourself up onto your forearm so your elbow is directly below your shoulder. Begin with abdominal bracing. Lift your hips off the floor and balance yourself on your forearm and the outside of your flexed foot. Do not let your ankle bend sideways. Hold for 5 seconds. Repeat on the other side. When you can do this exercise without pain for 10 to 15 seconds, ask your healthcare provider for more advanced exercises. Superman:  Lie on your stomach. Extend your arms forward on the floor. Tighten your abdominal muscles and lift your right hand and left leg off the floor. Hold this position. Slowly return to the starting position. Tighten your abdominal muscles and lift your left hand and right leg off the floor. Hold this position. Slowly return to the starting position. Clam:  Lie on your side with your knees bent. Put your bottom arm under your head to keep your neck in line. Put your top hand on your hip to keep your pelvis from moving. Put your heels together, and keep them together during this exercise. Slowly raise your top knee toward the ceiling. Then lower your leg so your knees are together. Repeat this exercise 10 times. Then switch sides and do the exercise 10 times with the other leg. Curl up:  Lie on your back with your knees bent and feet flat on the floor.  Place your hands, palms down, underneath your lower back. Next, with your elbows on the floor, lift your shoulders and chest 2 to 3 inches off the floor. Keep your head in line with your shoulders. Hold this position. Slowly return to the starting position. Straight leg raises:  Lie on your back with one leg straight. Bend the other knee and place your foot flat on the floor. Tighten your abdominal muscles. Keep your leg straight and slowly lift it straight up 6 to 12 inches off the floor. Hold this position. Lower your leg slowly. Do as many repetitions as directed on this side. Repeat with the other leg. Plank:  Lie on your stomach. Bend your elbows and place your forearms flat on the floor. Lift your chest, stomach, and knees off the floor. Make sure your elbows are below your shoulders. Your body should be in a straight line. Do not let your hips or lower back sink to the ground. Squeeze your abdominal muscles together and hold for 15 seconds. To make this exercise harder, hold for 30 seconds or lift 1 leg at a time. Bicycles:  Lie on your back. Bend both knees and bring them toward your chest. Your calves should be parallel to the floor. Place the palms of your hands on the back of your head. Straighten your right leg and keep it lifted 2 inches off the floor. Raise your head and shoulders off the floor and twist towards your left. Keep your head and shoulders lifted. Bend your right knee while you straighten your left leg. Keep your left leg 2 inches off the floor. Twist your head and chest towards the left leg. Continue to straighten 1 leg at a time and twist.       Follow up with your doctor or physical therapist as directed:  Write down your questions so you remember to ask them during your visits. © Copyright Cristela [employer] 2022 Information is for End User's use only and may not be sold, redistributed or otherwise used for commercial purposes. The above information is an  only.  It is not intended as medical advice for individual conditions or treatments. Talk to your doctor, nurse or pharmacist before following any medical regimen to see if it is safe and effective for you.

## 2023-07-27 NOTE — PROGRESS NOTES
Rachelle Salinas 70 y.o. female MRN: [id number]    Encounter: [id number]      Assessment/Plan     Assessment: This is a 70y.o.-year-old female with primary hyperparathyroidism, thyroid nodule, hypercalcemia, osteoporosis    Plan:    Diagnoses and all orders for this visit:    Elevated parathyroid hormone  Lab Results   Component Value Date    PTH 92.3 (H) 04/18/2023    CALCIUM 10.1 07/15/2023   Patient has history of elevated calcium, maximum calcium up to 10.9 mg per DL, elevated PTH suggestive of primary hyperparathyroidism. Gives history of fracture bones, osteoporosis. We will repeat PTH, phosphorus, calcium, will obtain 24-hour urine calcium/creatinine ratio to assess primary hyperparathyroidism versus familial hypocalciuric hypercalcemia. If 24-hour urine calcium/creatinine excretion ratio is more than 0.01, will obtain CT parathyroids with contrast  -     PTH, intact- Lab Collect; Future  -     Phosphorus Lab Collect; Future  -     PTH, intact- Lab Collect; Future    Hypercalcemia  · Most recent calcium is within normal range. Discussed with patient to avoid calcium supplementation. Also discussed to keep herself hydrated by drinking 8 ounces of water 4-5 times daily. · We will repeat blood work again in 6 to 7 months  · Also will order 24-hour urine calcium and creatinine to assess the calcium creatinine excretion ratio  Lab Results   Component Value Date    CALCIUM 10.1 07/15/2023     -     Ambulatory Referral to Endocrinology  -     Basic metabolic panel; Future  -     Albumin Lab Collect; Future  -     Calcium, urine, 24 hour Lab Collect; Future  -     Creatinine, urine, 24 hour; Future  -     Basic metabolic panel; Future  -     PTH, intact- Lab Collect; Future    Thyroid nodule  Right thyroid nodule, his dominant nodule and it was biopsied in 2021 with benign results. Patient has follow-up appointment with surgical oncology at [facility].   Note ultrasound ordered to be repeated in 6 months  -     Ambulatory Referral to Endocrinology  -     T4, free; Future  -     TSH, 3rd generation; Future  -     US thyroid; Future    Other osteoporosis without current pathological fracture  ·   · Patient has osteoporosis, increased risk of hip fracture. She would benefit from IV bisphosphonates or Prolia injection every 6 months. She is not a candidate for oral bisphosphonate therapy is history of dysphagia and history of hiatal hernia with esophageal reflux disease. · We will put the request for Reclast infusion once a year. · Discussed the side effects and benefits of Reclast infusion  · Discussed the importance of fall precautions  -     Discontinue: alendronate (FOSAMAX) 70 mg tablet; Take 1 tablet (70 mg total) by mouth every 7 days    Vitamin D insufficiency  Continue vitamin D3 supplementation 1000 IU daily  -     Vitamin D 25 hydroxy; Future  -     Vitamin D 25 hydroxy; Future    Benign essential hypertension  Blood pressure well controlled, continue current management as per PCP    CC:     Hyperparathyroidism, thyroid nodule, Hypercalcemia      History of Present Illness     HPI:  Vane Kramer is [id number] woman with medical history of hypertension, osteoporosis, vitamin D deficiency, hypercalcemia and elevated PTH thyroid nodule is referred here for evaluation of osteoporosis and management. DEXA scan in June 2022, patient was found to low mineral bone density in left hip, increased risk of fracture. Patient gives history of taking Fosamax for 1-1/2-year it was stopped by primary care physician, likely because of concern of hiatal hernia and dysphagia. Patient takes vitamin D3 supplementation regularly    Denies history of kidney stones.   History of fracture of left ankle (many years ago)    She takes vitamin D3 supplementation 1000 IU daily    For hypertension, she takes metoprolol XL 50 mg daily her blood pressure is well controlled  For hyperlipidemia she takes Lipitor 20 mg daily    Lab Results   Component Value Date    CREATININE 0.95 07/15/2023     She also has history of thyroid nodule, dominant on the right lobe which was biopsied in 2021 with benign results. She has a follow-up appointment with surgical oncology at [facility]  Previously thyroid blood work is within normal range, she does not take any thyroid medication or supplementation  Lab Results   Component Value Date    [id number] 0.641 04/18/2023       Dexa scan , 6/2022   Osteoporosis of the left femoral neck, corresponding to high increased   fracture risk. Bone mineral density is decreased. Osteopenia of the lumbar spine corresponding to increased fracture risk. Bone   mineral density is increased. History: Postmenopausal.     Hologic DEXA bone densitometry of the lumbar spine was performed. Average bone   density from L1 to L4 measured 0.941 g/ cm2. This corresponds to 90 percent of   peak bone mass. Findings are 1.0 standard deviations below the mean for peak   bone mass. Compared with 1/9/2020 bone mineral density has increased 8.2%. Bone densitometry of the left hip was performed. Total bone density of the left   hip measured 0.675 g/ cm2. This corresponds to 72 percent of peak bone  mass. Findings are 2.2 standard deviations below the mean for peak bone mass. In the   left femoral neck, bone density measures 0.542  g/cm2, corresponding to 64   percent of peak bone mass and 2.8 standard deviations below the mean for peak   bone mass. Bone mineral density left femoral neck has decreased 1.9%. Thyroid USG 4/2023   FINDINGS:  Thyroid parenchyma is diffusely heterogeneous in echotexture.     Right lobe: 4.5 x 1.5 x 1.5 cm. Volume 4.7 mL  Left lobe:  4.2 x 1.4 x 1.4 cm. Volume 4.1 mL  Isthmus: 0.2  cm.     Nodule #1. Image 9. RIGHT midgland nodule measuring 2.0 x 1.1 x 1.3 cm. COMPOSITION:  2 points, solid or almost completely solid .   ECHOGENICITY:  2 points, hypoechoic. SHAPE:  0 points, wider-than-tall. MARGIN: 0 points, smooth. ECHOGENIC FOCI:  0 points, none or large comet-tail artifacts. TI-RADS Classification: TR 4 (4-6 points),  FNA if > 1.5 cm. Follow if > 1cm.     There are additional nodules of lesser size and/or TI-RADS score. This includes a spongiform nodule in the left lobe measuring up to 1.4 cm. These do not necessitate additional evaluation based on ACR criteria.      IMPRESSION:     The right mid gland nodule on image 9 measuring 2.0 x 1.1 x 1.3 cm meets ACR criteria for recommending ultrasound guided biopsy. Per the sonographer's notes, patient has had a previous biopsy although details regarding this are not available. Correlate   with patient's prior history if this nodule has previously been biopsied and for size comparison. Biopsy is recommended if not previously performed. PARATHYROID SCAN, 5/2023      INDICATION:  R79.89: Other specified abnormal findings of blood chemistry  E83.52: Hypercalcemia elevated parathyroid hormone.     COMPARISON: Thyroid ultrasound 4/19/2023     TECHNIQUE:   Following the intravenous administration of mCi Tc-99m Cardiolite, anterior and bilateral anterior oblique projection images of the neck and mediastinum were obtained at approximately 10 minutes post injection followed at 2.5 hours post   injection by static anterior and bilateral oblique projections as well as SPECT images in coronal, sagittal and axial projections.     FINDINGS:     On the initial images, there is physiologic distribution of radiotracer throughout both thyroid glands, right more than left. On delayed 2.5-hour images, there is mild retention of radiotracer within the thyroid gland. No definite foci of increased   uptake on the delayed images beyond thyroid background. There is normal physiologic distribution within the salivary glands and in the remainder of the scanned regions.     IMPRESSION:     1.  No definite scintigraphic evidence of a parathyroid adenoma. PATHOLOGY REPORT              MR#:             [id number]   Patient:         Mike Monte   /Sex:         1945  F   Provider:        EARLENE DAVISON   [facility]         [facility]   Collect Date:    2021     [id number]     CYTOLOGIC DIAGNOSIS:   A  Thyroid Fine Needle Aspiration with Cyt[employer] Assistance, Right Mid   Nodule   Adequacy: Less than optimal for evaluation. General Category: II. Benign   Descriptive Interpretation: Consistent with a benign follicular   nodule. Latest Reference Range & Units 23 10:28   PARATHYROID HORMONE 18.4 - 80.1 pg/mL 92.3 (H)      Latest Reference Range & Units 23 10:28   Vit D, 25-Hydroxy 30.0 - 100.0 ng/mL 35.7      Latest Reference Range & Units 20 08:36 22 08:32 23 10:28 23 09:42 07/15/23 09:39   Sodium 135 - 147 mmol/L 142 145 138 141 139   Potassium 3.5 - 5.3 mmol/L 4.0 3.8 3.9 3.9 4.2   Chloride 96 - 108 mmol/L 110 (H) 110 (H) 110 (H) 111 (H) 110 (H)   CO2 21 - 32 mmol/L 25 23 24 25 23   Anion Gap mmol/L 7 12 4 5 6   BUN 5 - 25 mg/dL 24 28 (H) 26 (H) 23 27 (H)   Creatinine 0.60 - 1.30 mg/dL 0.92 1.04 1.12 1.03 0.95   Glucose, Random 65 - 140 mg/dL 98       GLUCOSE FASTING 65 - 99 mg/dL  99 97 98 96   Calcium 8.4 - 10.2 mg/dL 9.9 10.9 (H) 10.8 (H) 9.8 10.1   AST 13 - 39 U/L 14 18 18  12 (L)   ALT 7 - 52 U/L 22 19 31  11   Alkaline Phosphatase 34 - 104 U/L 76 69 68  79   Total Protein 6.4 - 8.4 g/dL 6.9 6.6 6.9  6.5   Albumin 3.5 - 5.0 g/dL 3.8 3.8 3.5  3.7   TOTAL BILIRUBIN 0.20 - 1.00 mg/dL 0.69 0.70 0.63  0.65   eGFR ml/min/1.73sq m 62 52 47 52 58   Magnesium 1.9 - 2.7 mg/dL   2.0  2.2     Review of Systems   Constitutional: Negative for activity change, diaphoresis, fatigue, fever and unexpected weight change. HENT: Negative. Eyes: Negative for visual disturbance. Respiratory: Negative for cough, chest tightness and shortness of breath. Cardiovascular: Negative for chest pain, palpitations and leg swelling. Gastrointestinal: Negative for abdominal pain, blood in stool, constipation, diarrhea, nausea and vomiting. Endocrine: Negative for cold intolerance, heat intolerance, polydipsia, polyphagia and polyuria. Genitourinary: Negative for dysuria, enuresis, frequency and urgency. Musculoskeletal: Negative for arthralgias and myalgias. Skin: Negative for pallor, rash and wound. Allergic/Immunologic: Negative. Neurological: Negative for dizziness, tremors, weakness and numbness. Hematological: Negative. Psychiatric/Behavioral: Negative. Historical Information   Past Medical History:   Diagnosis Date   • Arthritis    • Cellulitis of face 10/16/2019    Bilateral nares   • Family history of colon cancer in mother    • Functional murmur    • Hypercalcemia 2016   • Hypertension     Dx'd in , controlled    • Osteoporosis    • Screening for breast cancer 2018   • SOB (shortness of breath) 2020   • Viral upper respiratory tract infection 10/30/2018     Past Surgical History:   Procedure Laterality Date   • CARDIAC SURGERY     •  SECTION      x2, [facility]     • MITRAL VALVE REPAIR     • ND COLONOSCOPY FLX DX W/COLLJ SPEC WHEN PFRMD N/A 10/10/2016    Procedure: COLONOSCOPY;  Surgeon: Lenny Edmonds MD;  Location: [facility];   Service: Gastroenterology   • TOE SURGERY Bilateral     hammer toe correction surgery   • TONSILLECTOMY      with Adenoidectomy     Social History   Social History     Substance and Sexual Activity   Alcohol Use Not Currently    Comment: Rarely      Social History     Substance and Sexual Activity   Drug Use No     Social History     Tobacco Use   Smoking Status Never   • Passive exposure: Past   Smokeless Tobacco Never     Family History:   Family History   Problem Relation Age of Onset   • Colon cancer Mother 68   • Aneurysm Father    • No Known Problems Sister    • No Known Problems Paternal Aunt    • No Known Problems Paternal Aunt    • No Known Problems Paternal Aunt    • No Known Problems Paternal Aunt    • No Known Problems Maternal Grandmother    • No Known Problems Maternal Grandfather    • No Known Problems Paternal Grandmother    • No Known Problems Paternal Grandfather    • No Known Problems Daughter    • No Known Problems Son    • No Known Problems Son        Meds/Allergies   Current Outpatient Medications   Medication Sig Dispense Refill   • aspirin (ECOTRIN LOW STRENGTH) 81 mg EC tablet Take 1 tablet (81 mg total) by mouth daily 90 tablet 3   • atorvastatin (LIPITOR) 20 mg tablet Take 1 tablet (20 mg total) by mouth daily 90 tablet 3   • Coenzyme Q10 10 MG capsule Take 1 capsule (10 mg total) by mouth daily 90 capsule 3   • Diclofenac Sodium (VOLTAREN) 1 % Apply 2 g topically 4 (four) times a day 200 g 3   • losartan (COZAAR) 50 mg tablet Take 1 tablet (50 mg total) by mouth daily In the Morning. . 90 tablet 3   • metoprolol succinate (TOPROL-XL) 50 mg 24 hr tablet Take 1 tablet (50 mg total) by mouth daily In the evening daily 90 tablet 3   • torsemide (DEMADEX) 10 mg tablet Take 1 tablet (10 mg total) by mouth daily 90 tablet 3     No current facility-administered medications for this visit. No Known Allergies    Objective   Vitals: Blood pressure 124/78, pulse 68, height 4' 9" (1.448 m), weight 71.3 kg (157 lb 3.2 oz), SpO2 98 %, not currently breastfeeding. Physical Exam  Vitals reviewed. Constitutional:       General: She is not in acute distress. Appearance: She is well-developed. She is not diaphoretic. HENT:      Head: Normocephalic and atraumatic. Eyes:      General:         Right eye: No discharge. Left eye: No discharge. Conjunctiva/sclera: Conjunctivae normal.   Neck:      Thyroid: No thyromegaly. Cardiovascular:      Rate and Rhythm: Normal rate and regular rhythm. Heart sounds: Normal heart sounds.  No murmur heard. Pulmonary:      Effort: Pulmonary effort is normal. No respiratory distress. Breath sounds: Normal breath sounds. No wheezing. Abdominal:      General: Bowel sounds are normal. There is no distension. Palpations: Abdomen is soft. Tenderness: There is no abdominal tenderness. Musculoskeletal:         General: No tenderness or deformity. Normal range of motion. Cervical back: Normal range of motion and neck supple. Skin:     General: Skin is warm and dry. Findings: No erythema or rash. Neurological:      Mental Status: She is alert and oriented to person, place, and time. Cranial Nerves: No cranial nerve deficit. Motor: No abnormal muscle tone. Deep Tendon Reflexes: Reflexes are normal and symmetric. Reflexes normal.   Psychiatric:         Behavior: Behavior normal.         The history was obtained from the review of the chart, patient. Lab Results:   Lab Results   Component Value Date/Time    TSH 3RD GENERATON 0.641 04/18/2023 10:28 AM    Free T4 1.05 04/18/2023 10:28 AM       Imaging Studies:   Results for orders placed during the hospital encounter of 04/19/23    US thyroid    Impression  The right mid gland nodule on image 9 measuring 2.0 x 1.1 x 1.3 cm meets ACR criteria for recommending ultrasound guided biopsy. Per the sonographer's notes, patient has had a previous biopsy although details regarding this are not available. Correlate  with patient's prior history if this nodule has previously been biopsied and for size comparison. Biopsy is recommended if not previously performed. Reference: ACR Thyroid Imaging, Reporting and Data System (TI-RADS): White Paper of the [employer]. J AM Rikki Radiol 8953;38:318-061. (additional recommendations based on American Thyroid Association 2015 guidelines.)    The study was marked in EPIC for significant notification.       Workstation performed: [id number]      I have personally reviewed pertinent reports. Portions of the record may have been created with voice recognition software. Occasional wrong word or "sound a like" substitutions may have occurred due to the inherent limitations of voice recognition software. Read the chart carefully and recognize, using context, where substitutions have occurred.

## 2023-07-28 ENCOUNTER — TELEPHONE (OUTPATIENT)
Dept: ENDOCRINOLOGY | Facility: CLINIC | Age: 78
End: 2023-07-28

## 2023-07-28 ENCOUNTER — TREATMENT (OUTPATIENT)
Dept: ENDOCRINOLOGY | Facility: CLINIC | Age: 78
End: 2023-07-28

## 2023-07-28 ENCOUNTER — APPOINTMENT (OUTPATIENT)
Dept: LAB | Facility: AMBULARY SURGERY CENTER | Age: 78
End: 2023-07-28
Payer: COMMERCIAL

## 2023-07-28 DIAGNOSIS — E55.9 VITAMIN D INSUFFICIENCY: ICD-10-CM

## 2023-07-28 DIAGNOSIS — E34.9 ELEVATED PARATHYROID HORMONE: ICD-10-CM

## 2023-07-28 DIAGNOSIS — M81.0 AGE-RELATED OSTEOPOROSIS WITHOUT CURRENT PATHOLOGICAL FRACTURE: Primary | ICD-10-CM

## 2023-07-28 DIAGNOSIS — E83.52 HYPERCALCEMIA: ICD-10-CM

## 2023-07-28 LAB
25(OH)D3 SERPL-MCNC: 53.5 NG/ML (ref 30–100)
ALBUMIN SERPL BCP-MCNC: 3.5 G/DL (ref 3.5–5)
ANION GAP SERPL CALCULATED.3IONS-SCNC: 8 MMOL/L
BUN SERPL-MCNC: 27 MG/DL (ref 5–25)
CALCIUM SERPL-MCNC: 10.1 MG/DL (ref 8.3–10.1)
CHLORIDE SERPL-SCNC: 111 MMOL/L (ref 96–108)
CO2 SERPL-SCNC: 24 MMOL/L (ref 21–32)
CREAT SERPL-MCNC: 1.07 MG/DL (ref 0.6–1.3)
GFR SERPL CREATININE-BSD FRML MDRD: 50 ML/MIN/1.73SQ M
GLUCOSE P FAST SERPL-MCNC: 99 MG/DL (ref 65–99)
PHOSPHATE SERPL-MCNC: 2.8 MG/DL (ref 2.3–4.1)
POTASSIUM SERPL-SCNC: 4.3 MMOL/L (ref 3.5–5.3)
PTH-INTACT SERPL-MCNC: 170.4 PG/ML (ref 12–88)
SODIUM SERPL-SCNC: 143 MMOL/L (ref 135–147)

## 2023-07-28 PROCEDURE — 84100 ASSAY OF PHOSPHORUS: CPT

## 2023-07-28 PROCEDURE — 83970 ASSAY OF PARATHORMONE: CPT

## 2023-07-28 PROCEDURE — 82306 VITAMIN D 25 HYDROXY: CPT

## 2023-07-28 PROCEDURE — 36415 COLL VENOUS BLD VENIPUNCTURE: CPT

## 2023-07-28 PROCEDURE — 80048 BASIC METABOLIC PNL TOTAL CA: CPT

## 2023-07-28 PROCEDURE — 82040 ASSAY OF SERUM ALBUMIN: CPT

## 2023-07-28 RX ORDER — ZOLEDRONIC ACID 5 MG/100ML
5 INJECTION, SOLUTION INTRAVENOUS ONCE
OUTPATIENT
Start: 2023-08-15

## 2023-07-28 RX ORDER — SODIUM CHLORIDE 9 MG/ML
20 INJECTION, SOLUTION INTRAVENOUS ONCE
OUTPATIENT
Start: 2023-08-15

## 2023-07-28 NOTE — TELEPHONE ENCOUNTER
Please inform patient that vitamin D is normal, continue current dose of vitamin D3 supplementation. I have scheduled Reclast infusion in [place], please make appointment this is our first Reclast infusion. She should keep herself hydrated, before  and on the day of Reclast infusion.     Order is in place     [employer]

## 2023-08-01 ENCOUNTER — OFFICE VISIT (OUTPATIENT)
Dept: FAMILY MEDICINE CLINIC | Facility: CLINIC | Age: 78
End: 2023-08-01
Payer: COMMERCIAL

## 2023-08-01 VITALS
HEART RATE: 59 BPM | TEMPERATURE: 98.6 F | HEIGHT: 57 IN | BODY MASS INDEX: 33.87 KG/M2 | WEIGHT: 157 LBS | SYSTOLIC BLOOD PRESSURE: 124 MMHG | OXYGEN SATURATION: 99 % | DIASTOLIC BLOOD PRESSURE: 82 MMHG | RESPIRATION RATE: 14 BRPM

## 2023-08-01 DIAGNOSIS — M81.8 IDIOPATHIC OSTEOPOROSIS: ICD-10-CM

## 2023-08-01 DIAGNOSIS — I50.32 CHRONIC HEART FAILURE WITH PRESERVED EJECTION FRACTION (HCC): ICD-10-CM

## 2023-08-01 DIAGNOSIS — Z98.890 S/P MVR (MITRAL VALVE REPAIR): Primary | ICD-10-CM

## 2023-08-01 DIAGNOSIS — E04.1 THYROID NODULE: ICD-10-CM

## 2023-08-01 DIAGNOSIS — E34.9 ELEVATED PARATHYROID HORMONE: ICD-10-CM

## 2023-08-01 PROCEDURE — 3288F FALL RISK ASSESSMENT DOCD: CPT | Performed by: INTERNAL MEDICINE

## 2023-08-01 PROCEDURE — 99214 OFFICE O/P EST MOD 30 MIN: CPT | Performed by: INTERNAL MEDICINE

## 2023-08-01 RX ORDER — AMOXICILLIN 500 MG/1
500 CAPSULE ORAL EVERY 8 HOURS SCHEDULED
Qty: 30 CAPSULE | Refills: 0 | Status: SHIPPED | OUTPATIENT
Start: 2023-08-01 | End: 2023-08-11

## 2023-08-01 RX ORDER — ALENDRONATE SODIUM 70 MG/1
TABLET ORAL
COMMUNITY
Start: 2023-07-27 | End: 2023-08-01

## 2023-08-01 NOTE — PROGRESS NOTES
BMI Counseling: Body mass index is 33.97 kg/m². The BMI is above normal. Nutrition recommendations include reducing portion sizes.

## 2023-08-01 NOTE — PROGRESS NOTES
Name: Anali Soriano      : 1945      MRN: [id number]  Encounter Provider: Jalyn Zapata MD  Encounter Date: 2023   Encounter department: [address] [place] [address]     1. S/P MVR (mitral valve repair)  -     UA (URINE) with reflex to Scope; Future; Expected date: 2023  -     Comprehensive metabolic panel; Future  -     CBC and differential; Future  -     Magnesium; Future  -     Lipid panel; Future  -     Vitamin B12; Future  -     Vitamin D 25 hydroxy; Future; Expected date: 2023  -     amoxicillin (AMOXIL) 500 mg capsule; Take 1 capsule (500 mg total) by mouth every 8 (eight) hours for 10 days    2. Chronic heart failure with preserved ejection fraction (HCC)  -     UA (URINE) with reflex to Scope; Future; Expected date: 2023  -     Comprehensive metabolic panel; Future  -     CBC and differential; Future  -     Magnesium; Future  -     Lipid panel; Future    3. Elevated parathyroid hormone  -     UA (URINE) with reflex to Scope; Future; Expected date: 2023  -     Comprehensive metabolic panel; Future    4. Thyroid nodule  -     TSH, 3rd generation; Future; Expected date: 2023  -     T4, free; Future; Expected date: 2023  -     US thyroid; Future; Expected date: 2023  -     Thyroid Antibodies Panel; Future    5. Idiopathic osteoporosis  -     Vitamin D 25 hydroxy; Future; Expected date: 2023    stable  Improving  Continue same  FU w cardiology  FU w endocrinology  Life style Mod  RTC in 3 mos w blood work       Subjective      70 Y O lady is here for Regular check Up, she feels a lot better, recent blood work and med list reviewed w  Pt in detail. .. Review of Systems   Constitutional: Negative for chills, fatigue and fever. HENT: Positive for postnasal drip. Negative for congestion, facial swelling, sore throat, trouble swallowing and voice change.     Eyes: Negative for pain, discharge and visual disturbance. Respiratory: Negative for cough, shortness of breath and wheezing. Cardiovascular: Negative for chest pain, palpitations and leg swelling. Gastrointestinal: Negative for abdominal pain, blood in stool, constipation, diarrhea and nausea. Endocrine: Negative for polydipsia, polyphagia and polyuria. Genitourinary: Negative for difficulty urinating, hematuria and urgency. Musculoskeletal: Negative for arthralgias and myalgias. Skin: Negative for rash. Neurological: Negative for dizziness, tremors, weakness and headaches. Hematological: Negative for adenopathy. Does not bruise/bleed easily. Psychiatric/Behavioral: Negative for dysphoric mood, sleep disturbance and suicidal ideas. Current Outpatient Medications on File Prior to Visit   Medication Sig   • aspirin (ECOTRIN LOW STRENGTH) 81 mg EC tablet Take 1 tablet (81 mg total) by mouth daily   • atorvastatin (LIPITOR) 20 mg tablet Take 1 tablet (20 mg total) by mouth daily   • Coenzyme Q10 10 MG capsule Take 1 capsule (10 mg total) by mouth daily   • Diclofenac Sodium (VOLTAREN) 1 % Apply 2 g topically 4 (four) times a day   • losartan (COZAAR) 50 mg tablet Take 1 tablet (50 mg total) by mouth daily In the Morning. .   • metoprolol succinate (TOPROL-XL) 50 mg 24 hr tablet Take 1 tablet (50 mg total) by mouth daily In the evening daily   • torsemide (DEMADEX) 10 mg tablet Take 1 tablet (10 mg total) by mouth daily   • [DISCONTINUED] alendronate (FOSAMAX) 70 mg tablet        Objective     /82 (BP Location: Left arm, Patient Position: Sitting, Cuff Size: Standard)   Pulse 59   Temp 98.6 °F (37 °C) (Tympanic)   Resp 14   Ht 4' 9" (1.448 m)   Wt 71.2 kg (157 lb)   SpO2 99%   BMI 33.97 kg/m²     Physical Exam  Constitutional:       General: She is not in acute distress. HENT:      Head: Normocephalic. Mouth/Throat:      Pharynx: No oropharyngeal exudate. Eyes:      General: No scleral icterus.      Conjunctiva/sclera: Conjunctivae normal.      Pupils: Pupils are equal, round, and reactive to light. Neck:      Thyroid: No thyromegaly. Cardiovascular:      Rate and Rhythm: Normal rate and regular rhythm. Heart sounds: Murmur heard. Pulmonary:      Effort: Pulmonary effort is normal. No respiratory distress. Breath sounds: Normal breath sounds. No wheezing or rales. Abdominal:      General: Bowel sounds are normal. There is no distension. Palpations: Abdomen is soft. Tenderness: There is no abdominal tenderness. There is no guarding or rebound. Musculoskeletal:         General: No tenderness. Cervical back: Neck supple. Lymphadenopathy:      Cervical: No cervical adenopathy. Skin:     Coloration: Skin is not pale. Findings: No rash. Neurological:      Mental Status: She is alert and oriented to person, place, and time. Sensory: No sensory deficit. Motor: No weakness.        Isidro Dee MD

## 2023-08-01 NOTE — PATIENT INSTRUCTIONS
Osteoporosis Education   Osteoporosis  is a long-term medical condition that causes your bones to become weak, brittle, and more likely to fracture. Osteoporosis occurs when your body absorbs more bone than it makes. It is also caused by a lack of calcium and estrogen (female hormone). Common symptoms include the following: You may not have any signs or symptoms. You may break a bone after a muscle strain, bump, or fall. A break usually occurs in the hip, spine, or wrist. A collapsed vertebra (bone in your spine) may cause severe back pain or loss of height from bent posture. Call your doctor if:   ·  You have severe pain. ·  You have increasing pain after a fall. ·  You have pain when you do your daily activities. ·  You have questions or concerns about your condition or care. Diagnosis of osteoporosis:   · Blood and urine tests  measure your calcium, vitamin D, and estrogen levels. · An x-ray or CT may show thinned bones or a fracture. You may be given contrast liquid to help the bones show up better in the pictures. Tell the healthcare provider if you have ever had an allergic reaction to contrast liquid. Do not enter the MRI room with anything metal. Metal can cause serious injury. Tell the healthcare provider if you have any metal in or on your body. · A bone density test  compares your bone thickness with what is expected for someone of your age, gender, and ethnicity. Treatment for osteoporosis may include medicines to prevent bone loss, build new bone, and increase estrogen. These medicines help prevent fractures and may be given as a pill or injection. Ask your healthcare provider for more information on these medicines. Prevent bone loss:  · Eat healthy foods that are high in calcium. This helps keep your bones strong. Good sources of calcium are milk, cheese, broccoli, tofu, almonds, and canned salmon and sardines. Recommended to get at least 1200mg daily of calcium.   · Increase your vitamin D intake. Vitamin D is in fish oils, some vegetables, and fortified milk, cereal, and bread. Vitamin D is also formed in the skin when it is exposed to the sun. Ask your healthcare provider how much sunlight is safe for you. You will require at least 800 units of vitamin D daily taken as a supplement. · Drink liquids as directed. Ask your healthcare provider how much liquid to drink each day and which liquids are best for you. Do not have alcohol or caffeine. They decrease bone mineral density, which can weaken your bones. · Exercise regularly. Ask your healthcare provider about the best exercise plan for you. Weight bearing exercise for 30 minutes, 3 times a week can help build and strengthen bone. · Do not smoke. Nicotine and other chemicals in cigarettes and cigars can cause lung damage. Ask your healthcare provider for information if you currently smoke and need help to quit. E-cigarettes or smokeless tobacco still contain nicotine. Talk to your healthcare provider before you use these products. · Go to physical therapy as directed. A physical therapist teaches you exercises to help improve movement and muscle strength. · Alcohol. It is recommended to avoid heavy alcohol use as increased consumption of alcohol is known to cause bone loss  © Copyright [employer] 2021 Information is for End User's use only and may not be sold, redistributed or otherwise used for commercial purposes. All illustrations and images included in CareNotes® are the copyrighted property of A.D.A.M., Inc. or [address] [place] [address]    Calcium and vitamin D for bone health (Beyond the Basics)    CALCIUM AND VITAMIN D OVERVIEW  Osteoporosis is a common bone disorder that causes a progressive loss in bone density and mass. As a result, bones become thin, weakened, and easily fractured.  It is estimated that more than 1.3 million osteoporosis-associated (or "osteoporotic") fractures occur every year in the [place], primarily of bone within the spine (the vertebrae), the hip, and the forearm near the wrist. =    A number of treatments can help to prevent loss of bone and treat low bone mass. However, the first step in preventing or treating osteoporosis is to consume foods and drinks that provide calcium, a mineral essential for bone strength, and vitamin D, which aids in calcium breakdown and absorption. =    CALCIUM AND VITAMIN D BENEFITS  Good nutrition is important at all ages to keep the bones healthy. · Taking calcium reduces bone loss and decreases the risk of fracturing the vertebrae (the bones that surround the spinal cord). · Consuming calcium during childhood (eg, in milk) can lead to higher bone mass in adulthood. This increase in bone density can reduce the risk of fractures later in life. · Calcium may also have benefits in other body systems by reducing blood pressure and cholesterol levels. · Calcium and vitamin D supplements may help prevent tooth loss in older adults. RECOMMENDATIONS FOR CALCIUM    General recommendations -- Premenopausal women and men should consume at least 1000 mg of calcium, while postmenopausal women should consume 1200 mg (total diet plus supplement). You should not consume more than 2000 mg of calcium per day (total diet plus supplement) due to the risk of side effects. Calcium in the diet -- The primary sources of calcium in the diet include milk and other dairy products, such as hard cheese, cottage cheese, or yogurt, as well as green vegetables, such as kale and broccoli (table 1). Some cereals, soy products, and fruit juices are fortified with calcium. Calcium supplements -- The body is able to absorb calcium contained in supplements as well as from dietary sources. If it is not possible to get enough calcium from dietary sources, consult a health care provider to determine the best type, dose, and timing of calcium supplements.      · Calcium carbonate is effective and is the least expensive form of calcium. It is best absorbed with a low-iron meal (such as breakfast). Calcium carbonate may not be absorbed well in people who also take a specific medication for gastroesophageal reflux (called a proton pump inhibitor or H2 blocker), which blocks stomach acid. · Many natural calcium carbonate preparations such as oyster shells or bone meal contain some lead. · Calcium citrate is well absorbed in the fasting state as well as with a meal.  · Calcium doses above 500 mg are not absorbed as well as smaller doses, so large doses of supplements should be taken in divided doses (eg, in the morning and evening). · Calcium supplements do not replace other osteoporosis treatments such as hormone replacement, bisphosphonates (eg, risedronate [sample brand name: Actonel] and alendronate [brand name: Fosamax]), and raloxifene (brand name: Evista). Calcium and vitamin D supplements alone are usually insufficient to prevent age-related bone loss, although they may be beneficial in some subgroups (older adults, those with very low intake). In most patients with or at risk for osteoporosis, the addition of medication or hormonal therapy is necessary in order to slow the breakdown and removal of bone (ie, resorption). Underlying gastrointestinal diseases -- Patients who do not adequately absorb nutrients from the gastrointestinal tract (due to malabsorption) may require more than 1000 to 1200 mg of calcium per day. In such cases, a health care provider can help to determine the optimal dose of calcium. Medications -- All medications should be discussed with a health care provider to ensure that possible interactions with calcium are identified. Certain medications change the amount of calcium that is absorbed and/or excreted. As an example, loop diuretics (eg, furosemide [sample brand name: Lasix]) increase the amount of calcium excreted in the urine.     On the other hand, thiazide diuretics (eg, hydrochlorothiazide) can reduce levels of calcium in the urine, potentially reducing the risk of bone loss and kidney stones. Side effects of calcium -- Calcium is usually easily tolerated when it is taken in divided doses several times per day. Some people experience side effects related to calcium, including constipation and indigestion. Calcium supplements interfere with the absorption of iron and thyroid hormone, and therefore, these medications should be taken at different times. Kidney stones -- There is no evidence that consuming large amounts of calcium (from foods and drinks) increases the risk of kidney stones, or that avoiding dietary calcium decreases the risk. In fact, avoiding dairy products is likely to increase the risk of kidney stones. However, use of calcium supplements may increase the risk of kidney stones in susceptible individuals by raising the level of calcium in the urine. This is particularly true if the supplement is taken between meals or at bedtime, and this is one of the reasons we prefer for patients to get as much of their calcium requirement through dietary means. IMPORTANCE OF VITAMIN D  Vitamin D decreases bone loss and lowers the risk of fracture, especially in older men and women. Along with calcium, vitamin D also helps to prevent and treat osteoporosis. To absorb calcium efficiently, an adequate amount of vitamin D must be present. Vitamin D is normally made in the skin after exposure to sunlight. Recommendations for vitamin D -- The current recommendation is that men over 70 years and postmenopausal women consume at least 800 international units (20 micrograms) of vitamin D per day. Lower levels of vitamin D are not as effective, while high doses can be toxic, especially if taken for long periods of time.  Although the optimal intake has not been clearly established in premenopausal women or in younger men with osteoporosis, 600 international units (15 micrograms) of vitamin D daily is generally suggested. Vitamin D is available as an individual supplement and is included in most multivitamins and some calcium supplements (table 2). Milk is a relatively good dietary source of vitamin D, with approximately 100 international units (2.5 micrograms) per cup (240 mL), and salmon has 800 to 1000 international units (20 to 25 micrograms) of vitamin D per serving. Most healthy people don't need to check with their health care provider before taking standard doses of vitamin D and don't need monitoring of vitamin D levels if they are not being treated for vitamin D deficiency. However, recommendations for vitamin D supplementation may be different in people with certain underlying medical conditions, such as sarcoidosis or lymphoma. In these situations, a provider will determine if supplements are needed; if so, the person's vitamin D and calcium levels should be monitored with regular tests. ©5[address] [facility], [address] [facility] rights reserved.

## 2023-08-07 ENCOUNTER — APPOINTMENT (OUTPATIENT)
Dept: LAB | Facility: AMBULARY SURGERY CENTER | Age: 78
End: 2023-08-07
Payer: COMMERCIAL

## 2023-08-07 ENCOUNTER — TELEPHONE (OUTPATIENT)
Dept: GASTROENTEROLOGY | Facility: CLINIC | Age: 78
End: 2023-08-07

## 2023-08-07 DIAGNOSIS — E83.52 HYPERCALCEMIA: ICD-10-CM

## 2023-08-07 LAB
CREAT 24H UR-MRATE: 0.9 G/24HR (ref 0.6–1.8)
SPECIMEN VOL UR: 1200 ML
SPECIMEN VOL UR: 1200 ML

## 2023-08-07 PROCEDURE — 82570 ASSAY OF URINE CREATININE: CPT

## 2023-08-07 PROCEDURE — 82340 ASSAY OF CALCIUM IN URINE: CPT

## 2023-08-07 NOTE — TELEPHONE ENCOUNTER
Pt My Chart booked appt with Dr. Kaitlyn Fallon but has historically seen Dr. Danielle Harris and Margarita Cazares only. Recent colonoscopy was just 2 months ago with Dr. Danielle Harris.      For My Chart tracking purposes: Appt was made through My Chart 6/16/2023 10:31 AM

## 2023-08-08 ENCOUNTER — HOSPITAL ENCOUNTER (OUTPATIENT)
Dept: ULTRASOUND IMAGING | Facility: HOSPITAL | Age: 78
Discharge: HOME | End: 2023-08-08
Payer: COMMERCIAL

## 2023-08-08 DIAGNOSIS — E04.1 THYROID NODULE: ICD-10-CM

## 2023-08-08 PROCEDURE — 76536 US EXAM OF HEAD AND NECK: CPT

## 2023-08-14 ENCOUNTER — TELEPHONE (OUTPATIENT)
Dept: INFUSION CENTER | Facility: CLINIC | Age: 78
End: 2023-08-14

## 2023-08-14 DIAGNOSIS — E78.5 DYSLIPIDEMIA: ICD-10-CM

## 2023-08-14 RX ORDER — ATORVASTATIN CALCIUM 20 MG/1
20 TABLET, FILM COATED ORAL DAILY
Qty: 100 TABLET | Refills: 3 | Status: SHIPPED | OUTPATIENT
Start: 2023-08-14

## 2023-08-14 NOTE — TELEPHONE ENCOUNTER
Called and spoke with pt to confirm appts on 8/15 at 11 am. Pt aware of where to check in and of the visitor policy. Pt has no further questions or concerns.

## 2023-08-14 NOTE — TELEPHONE ENCOUNTER
Pharmacy called and stated that Pts insurance covers 100days supply of atorvastatin 20mg. Pharmacy requesting nee prescription.

## 2023-08-15 ENCOUNTER — HOSPITAL ENCOUNTER (OUTPATIENT)
Dept: INFUSION CENTER | Facility: CLINIC | Age: 78
Discharge: HOME | End: 2023-08-15

## 2023-08-15 DIAGNOSIS — M81.0 AGE-RELATED OSTEOPOROSIS WITHOUT CURRENT PATHOLOGICAL FRACTURE: ICD-10-CM

## 2023-08-15 NOTE — PROGRESS NOTES
Patient here for first dose reclast, height and weight confirmed, labs drawn 7/28/23. Per these numbers patient creatinine clearance 33.1. Reported this to Dr. Lillian Mulligan who wants us to hold treatment today and she will discuss other options with patient. Reviewed above with patient and advised her to call office to schedule f/u, she verbalized understanding and was discharged.

## 2023-08-15 NOTE — PATIENT INSTRUCTIONS
August 2023 Sunday Monday Tuesday Wednesday Thursday Friday Saturday             1    OFFICE VISIT SHORT PG   9:15 AM   (15 min.)   Nasra Frausto MD   [facility] 2     3     4     5       6     7    LAB WALK IN   8:15 AM   (5 min.)   AN SP PHLEB CHAIR 1   [facility] 8    US THYROID  10:00 AM   (30 min.)   AN US [address] [facility] 9     10     11     12       13     14     15    INF NON ONC PAPER ORDER  11:00 AM   (90 min.)   AN INF CHAIR [facility] 16     17     18     19       20     21     22    SO SCREEN BILAT W DBT & CAD   3:20 PM   (20 min.)   BE MAMMO SLN 1   [facility]    DXA BONE DENSE SPINE HIP/PELV   3:40 PM   (30 min.)   BE DEXA SHA SLN 1   [facility] 23     24     25     26       27     28     29     30     31

## 2023-08-21 ENCOUNTER — TELEPHONE (OUTPATIENT)
Dept: FAMILY MEDICINE CLINIC | Facility: CLINIC | Age: 78
End: 2023-08-21

## 2023-08-21 ENCOUNTER — TELEPHONE (OUTPATIENT)
Dept: ENDOCRINOLOGY | Facility: CLINIC | Age: 78
End: 2023-08-21

## 2023-08-21 NOTE — TELEPHONE ENCOUNTER
Patient called due to her creatine was too high to have her infusion. Please call to schedule a follow up.

## 2023-08-21 NOTE — TELEPHONE ENCOUNTER
Called pt and left message on pts vm that Dr Mahi Rashid would like pt to come in for an office visit and I said their is an opening tomm

## 2023-08-21 NOTE — TELEPHONE ENCOUNTER
Spoke with pt and called Dr. Dominguez [facility]'s office to confirm what the situation is and Dr. Kristie Mercer is an Endocrinologist. I spoke with the  at their office and was instructed to leave a voicemail on the nurse line as to the situation to figure out what the creatinine numbers are, as well as the infusions and medications that are supposed to be taken at this time. I left a detailed message for their office to call me back so we can establish a plan for the patient.  Please advise - as

## 2023-08-22 ENCOUNTER — HOSPITAL ENCOUNTER (OUTPATIENT)
Dept: RADIOLOGY | Age: 78
Discharge: HOME | End: 2023-08-22
Payer: COMMERCIAL

## 2023-08-22 VITALS — WEIGHT: 153 LBS | HEIGHT: 57 IN | BODY MASS INDEX: 33.01 KG/M2

## 2023-08-22 DIAGNOSIS — M81.8 IDIOPATHIC OSTEOPOROSIS: ICD-10-CM

## 2023-08-22 DIAGNOSIS — Z12.31 ENCOUNTER FOR SCREENING MAMMOGRAM FOR BREAST CANCER: ICD-10-CM

## 2023-08-22 PROCEDURE — 77063 BREAST TOMOSYNTHESIS BI: CPT

## 2023-08-22 PROCEDURE — 77080 DXA BONE DENSITY AXIAL: CPT

## 2023-08-22 PROCEDURE — 77067 SCR MAMMO BI INCL CAD: CPT

## 2023-08-23 DIAGNOSIS — M81.8 IDIOPATHIC OSTEOPOROSIS: Primary | ICD-10-CM

## 2023-08-23 RX ORDER — CAL/D3/MAG11/ZINC/COP/MANG/BOR 600 MG-800
1 TABLET ORAL 2 TIMES DAILY WITH MEALS
Qty: 200 TABLET | Refills: 6 | Status: SHIPPED | OUTPATIENT
Start: 2023-08-23

## 2023-08-25 NOTE — TELEPHONE ENCOUNTER
Called pt and received pts voicemail and I left a message to call our office that Dr Gerhardt Ratel would like pt to be seen next week

## 2023-09-12 ENCOUNTER — CLINICAL SUPPORT (OUTPATIENT)
Dept: FAMILY MEDICINE CLINIC | Facility: CLINIC | Age: 78
End: 2023-09-12
Payer: COMMERCIAL

## 2023-09-12 DIAGNOSIS — M81.8 IDIOPATHIC OSTEOPOROSIS: Primary | ICD-10-CM

## 2023-09-12 PROCEDURE — 96372 THER/PROPH/DIAG INJ SC/IM: CPT

## 2023-09-21 DIAGNOSIS — I10 BENIGN ESSENTIAL HYPERTENSION: ICD-10-CM

## 2023-09-21 RX ORDER — LOSARTAN POTASSIUM 50 MG/1
50 TABLET ORAL DAILY
Qty: 100 TABLET | Refills: 3 | Status: SHIPPED | OUTPATIENT
Start: 2023-09-21

## 2023-10-05 ENCOUNTER — RA CDI HCC (OUTPATIENT)
Dept: OTHER | Facility: HOSPITAL | Age: 78
End: 2023-10-05

## 2023-10-05 NOTE — PROGRESS NOTES
E11.22, I13.0  [address] [facility] coding opportunities          Chart Reviewed number of suggestions sent to Provider: 2     Patients Insurance     Medicare Insurance: [id number] [employer] [facility]

## 2023-10-13 ENCOUNTER — OFFICE VISIT (OUTPATIENT)
Dept: FAMILY MEDICINE CLINIC | Facility: CLINIC | Age: 78
End: 2023-10-13
Payer: COMMERCIAL

## 2023-10-13 VITALS
HEIGHT: 57 IN | HEART RATE: 67 BPM | TEMPERATURE: 98.5 F | RESPIRATION RATE: 14 BRPM | OXYGEN SATURATION: 98 % | SYSTOLIC BLOOD PRESSURE: 128 MMHG | DIASTOLIC BLOOD PRESSURE: 82 MMHG | BODY MASS INDEX: 33.22 KG/M2 | WEIGHT: 154 LBS

## 2023-10-13 DIAGNOSIS — M81.8 IDIOPATHIC OSTEOPOROSIS: ICD-10-CM

## 2023-10-13 DIAGNOSIS — Z98.890 S/P MVR (MITRAL VALVE REPAIR): ICD-10-CM

## 2023-10-13 DIAGNOSIS — I50.32 CHRONIC HEART FAILURE WITH PRESERVED EJECTION FRACTION (HCC): ICD-10-CM

## 2023-10-13 DIAGNOSIS — Z23 ENCOUNTER FOR IMMUNIZATION: Primary | ICD-10-CM

## 2023-10-13 DIAGNOSIS — E04.1 THYROID NODULE: ICD-10-CM

## 2023-10-13 PROCEDURE — 90662 IIV NO PRSV INCREASED AG IM: CPT

## 2023-10-13 PROCEDURE — G0008 ADMIN INFLUENZA VIRUS VAC: HCPCS

## 2023-10-13 PROCEDURE — 99214 OFFICE O/P EST MOD 30 MIN: CPT | Performed by: INTERNAL MEDICINE

## 2023-10-13 RX ORDER — CAL/D3/MAG11/ZINC/COP/MANG/BOR 600 MG-800
1 TABLET ORAL 2 TIMES DAILY WITH MEALS
Qty: 200 TABLET | Refills: 6 | Status: SHIPPED | OUTPATIENT
Start: 2023-10-13

## 2023-10-13 NOTE — PROGRESS NOTES
Name: Yonis Oconnor      : 1945      MRN: [id number]  Encounter Provider: Ursula Live MD  Encounter Date: 10/13/2023   Encounter department: [address] [place] [address]     1. Encounter for immunization  -     influenza vaccine, high-dose, PF 0.7 mL (FLUZONE HIGH-DOSE)    2. Chronic heart failure with preserved ejection fraction (HCC)  Comments:  stable, continue same, FU w cardiology, RTC in 3 mos w Blood work    3. S/P MVR (mitral valve repair)  Comments:  stable, continue same, FU w cardiology    4. Thyroid nodule    5. Idiopathic osteoporosis  Comments:  Caltrate Plus D  daily, and Prolia Inj Q 6 mos  Orders:  -     denosumab (PROLIA) 60 mg/mL; Inject 1 mL (60 mg total) under the skin once for 1 dose  -     Calcium Carbonate-Vit D-Min (Caltrate 600+D Plus Minerals) 600-800 MG-UNIT TABS; Take 1 tablet by mouth 2 (two) times a day with meals    Life style mod  Continue same  RTC in 3 mos w Blood work    Depression Screening and Follow-up Plan: Patient was screened for depression during today's encounter. They screened negative with a PHQ-2 score of 0. Subjective      [address] is here for Regular check up, she feels OK, recent blood work and med list reviewed w Pt,... Review of Systems   Constitutional:  Negative for chills, fatigue and fever. HENT:  Negative for congestion, facial swelling, sore throat, trouble swallowing and voice change. Eyes:  Negative for pain, discharge and visual disturbance. Respiratory:  Negative for cough, shortness of breath and wheezing. Cardiovascular:  Negative for chest pain, palpitations and leg swelling. Gastrointestinal:  Negative for abdominal pain, blood in stool, constipation, diarrhea and nausea. Endocrine: Negative for polydipsia, polyphagia and polyuria. Genitourinary:  Negative for difficulty urinating, hematuria and urgency. Musculoskeletal:  Negative for arthralgias and myalgias. Skin:  Negative for rash. Neurological:  Negative for dizziness, tremors, weakness and headaches. Hematological:  Negative for adenopathy. Does not bruise/bleed easily. Psychiatric/Behavioral:  Negative for dysphoric mood, sleep disturbance and suicidal ideas. Current Outpatient Medications on File Prior to Visit   Medication Sig    aspirin (ECOTRIN LOW STRENGTH) 81 mg EC tablet Take 1 tablet (81 mg total) by mouth daily    atorvastatin (LIPITOR) 20 mg tablet Take 1 tablet (20 mg total) by mouth daily    Coenzyme Q10 10 MG capsule Take 1 capsule (10 mg total) by mouth daily    Diclofenac Sodium (VOLTAREN) 1 % Apply 2 g topically 4 (four) times a day    losartan (COZAAR) 50 mg tablet Take 1 tablet (50 mg total) by mouth daily In the Morning. .    metoprolol succinate (TOPROL-XL) 50 mg 24 hr tablet Take 1 tablet (50 mg total) by mouth daily In the evening daily    torsemide (DEMADEX) 10 mg tablet Take 1 tablet (10 mg total) by mouth daily    [DISCONTINUED] Calcium Carbonate-Vit D-Min (Caltrate 600+D Plus Minerals) 600-800 MG-UNIT TABS Take 1 tablet by mouth 2 (two) times a day with meals    [DISCONTINUED] denosumab (PROLIA) 60 mg/mL Inject 1 mL (60 mg total) under the skin once for 1 dose       Objective     /82 (BP Location: Left arm, Patient Position: Sitting, Cuff Size: Standard)   Pulse 67   Temp 98.5 °F (36.9 °C) (Tympanic)   Resp 14   Ht 4' 9" (1.448 m)   Wt 69.9 kg (154 lb)   SpO2 98%   BMI 33.33 kg/m²     Physical Exam  Constitutional:       General: She is not in acute distress. HENT:      Head: Normocephalic. Mouth/Throat:      Pharynx: No oropharyngeal exudate. Eyes:      General: No scleral icterus. Conjunctiva/sclera: Conjunctivae normal.      Pupils: Pupils are equal, round, and reactive to light. Neck:      Thyroid: No thyromegaly. Cardiovascular:      Rate and Rhythm: Normal rate and regular rhythm. Heart sounds: Normal heart sounds.  No murmur heard.  Pulmonary:      Effort: Pulmonary effort is normal. No respiratory distress. Breath sounds: Normal breath sounds. No wheezing or rales. Abdominal:      General: Bowel sounds are normal. There is no distension. Palpations: Abdomen is soft. Tenderness: There is no abdominal tenderness. There is no guarding or rebound. Musculoskeletal:         General: No tenderness. Cervical back: Neck supple. Lymphadenopathy:      Cervical: No cervical adenopathy. Skin:     Coloration: Skin is not pale. Findings: No rash. Neurological:      Mental Status: She is alert and oriented to person, place, and time. Sensory: No sensory deficit. Motor: No weakness.        Radha Perez MD

## 2023-10-13 NOTE — PATIENT INSTRUCTIONS

## 2023-10-13 NOTE — PROGRESS NOTES
BMI Counseling: Body mass index is 33.33 kg/m². The BMI is above normal. Nutrition recommendations include reducing portion sizes.

## 2024-01-17 ENCOUNTER — RA CDI HCC (OUTPATIENT)
Dept: OTHER | Facility: HOSPITAL | Age: 79
End: 2024-01-17

## 2024-01-17 NOTE — PROGRESS NOTES
I13.0, E11.22  HCC coding opportunities          Chart Reviewed number of suggestions sent to Provider: 2     Patients Insurance     Medicare Insurance: [employer]

## 2024-01-19 ENCOUNTER — APPOINTMENT (OUTPATIENT)
Dept: LAB | Facility: AMBULARY SURGERY CENTER | Age: 79
End: 2024-01-19
Payer: COMMERCIAL

## 2024-01-19 DIAGNOSIS — E04.1 THYROID NODULE: ICD-10-CM

## 2024-01-19 DIAGNOSIS — R79.89 ELEVATED PARATHYROID HORMONE: ICD-10-CM

## 2024-01-19 DIAGNOSIS — I50.32 CHRONIC HEART FAILURE WITH PRESERVED EJECTION FRACTION (HCC): ICD-10-CM

## 2024-01-19 DIAGNOSIS — M81.8 IDIOPATHIC OSTEOPOROSIS: ICD-10-CM

## 2024-01-19 DIAGNOSIS — Z98.890 S/P MVR (MITRAL VALVE REPAIR): ICD-10-CM

## 2024-01-19 LAB
25(OH)D3 SERPL-MCNC: 45.2 NG/ML (ref 30–100)
ALBUMIN SERPL BCP-MCNC: 4 G/DL (ref 3.5–5)
ALP SERPL-CCNC: 78 U/L (ref 34–104)
ALT SERPL W P-5'-P-CCNC: 12 U/L (ref 7–52)
ANION GAP SERPL CALCULATED.3IONS-SCNC: 10 MMOL/L
AST SERPL W P-5'-P-CCNC: 16 U/L (ref 13–39)
BASOPHILS # BLD AUTO: 0.07 THOUSANDS/ÂΜL (ref 0–0.1)
BASOPHILS NFR BLD AUTO: 1 % (ref 0–1)
BILIRUB SERPL-MCNC: 0.7 MG/DL (ref 0.2–1)
BUN SERPL-MCNC: 22 MG/DL (ref 5–25)
CALCIUM SERPL-MCNC: 10.6 MG/DL (ref 8.4–10.2)
CHLORIDE SERPL-SCNC: 107 MMOL/L (ref 96–108)
CHOLEST SERPL-MCNC: 143 MG/DL
CO2 SERPL-SCNC: 26 MMOL/L (ref 21–32)
CREAT SERPL-MCNC: 1.01 MG/DL (ref 0.6–1.3)
EOSINOPHIL # BLD AUTO: 0.14 THOUSAND/ÂΜL (ref 0–0.61)
EOSINOPHIL NFR BLD AUTO: 1 % (ref 0–6)
ERYTHROCYTE [DISTWIDTH] IN BLOOD BY AUTOMATED COUNT: 13 % (ref 11.6–15.1)
GFR SERPL CREATININE-BSD FRML MDRD: 54 ML/MIN/1.73SQ M
GLUCOSE P FAST SERPL-MCNC: 99 MG/DL (ref 65–99)
HCT VFR BLD AUTO: 42.6 % (ref 34.8–46.1)
HDLC SERPL-MCNC: 72 MG/DL
HGB BLD-MCNC: 13.9 G/DL (ref 11.5–15.4)
IMM GRANULOCYTES # BLD AUTO: 0.03 THOUSAND/UL (ref 0–0.2)
IMM GRANULOCYTES NFR BLD AUTO: 0 % (ref 0–2)
LDLC SERPL CALC-MCNC: 48 MG/DL (ref 0–100)
LYMPHOCYTES # BLD AUTO: 2.9 THOUSANDS/ÂΜL (ref 0.6–4.47)
LYMPHOCYTES NFR BLD AUTO: 28 % (ref 14–44)
MAGNESIUM SERPL-MCNC: 2 MG/DL (ref 1.9–2.7)
MCH RBC QN AUTO: 32.1 PG (ref 26.8–34.3)
MCHC RBC AUTO-ENTMCNC: 32.6 G/DL (ref 31.4–37.4)
MCV RBC AUTO: 98 FL (ref 82–98)
MONOCYTES # BLD AUTO: 0.81 THOUSAND/ÂΜL (ref 0.17–1.22)
MONOCYTES NFR BLD AUTO: 8 % (ref 4–12)
NEUTROPHILS # BLD AUTO: 6.46 THOUSANDS/ÂΜL (ref 1.85–7.62)
NEUTS SEG NFR BLD AUTO: 62 % (ref 43–75)
NONHDLC SERPL-MCNC: 71 MG/DL
NRBC BLD AUTO-RTO: 0 /100 WBCS
PLATELET # BLD AUTO: 294 THOUSANDS/UL (ref 149–390)
PMV BLD AUTO: 9.3 FL (ref 8.9–12.7)
POTASSIUM SERPL-SCNC: 4 MMOL/L (ref 3.5–5.3)
PROT SERPL-MCNC: 6.8 G/DL (ref 6.4–8.4)
RBC # BLD AUTO: 4.33 MILLION/UL (ref 3.81–5.12)
SODIUM SERPL-SCNC: 143 MMOL/L (ref 135–147)
T4 FREE SERPL-MCNC: 0.95 NG/DL (ref 0.61–1.12)
TRIGL SERPL-MCNC: 115 MG/DL
TSH SERPL DL<=0.05 MIU/L-ACNC: 0.44 UIU/ML (ref 0.45–4.5)
VIT B12 SERPL-MCNC: 247 PG/ML (ref 180–914)
WBC # BLD AUTO: 10.41 THOUSAND/UL (ref 4.31–10.16)

## 2024-01-19 PROCEDURE — 80053 COMPREHEN METABOLIC PANEL: CPT

## 2024-01-19 PROCEDURE — 82607 VITAMIN B-12: CPT

## 2024-01-19 PROCEDURE — 82306 VITAMIN D 25 HYDROXY: CPT

## 2024-01-19 PROCEDURE — 85025 COMPLETE CBC W/AUTO DIFF WBC: CPT

## 2024-01-19 PROCEDURE — 84443 ASSAY THYROID STIM HORMONE: CPT

## 2024-01-19 PROCEDURE — 86800 THYROGLOBULIN ANTIBODY: CPT

## 2024-01-19 PROCEDURE — 80061 LIPID PANEL: CPT

## 2024-01-19 PROCEDURE — 36415 COLL VENOUS BLD VENIPUNCTURE: CPT

## 2024-01-19 PROCEDURE — 83735 ASSAY OF MAGNESIUM: CPT

## 2024-01-19 PROCEDURE — 84439 ASSAY OF FREE THYROXINE: CPT

## 2024-01-19 PROCEDURE — 86376 MICROSOMAL ANTIBODY EACH: CPT

## 2024-01-20 LAB
THYROGLOB AB SERPL-ACNC: <1 IU/ML (ref 0–0.9)
THYROPEROXIDASE AB SERPL-ACNC: <9 IU/ML (ref 0–34)

## 2024-01-26 ENCOUNTER — OFFICE VISIT (OUTPATIENT)
Dept: FAMILY MEDICINE CLINIC | Facility: CLINIC | Age: 79
End: 2024-01-26
Payer: COMMERCIAL

## 2024-01-26 VITALS
WEIGHT: 155 LBS | OXYGEN SATURATION: 98 % | HEART RATE: 67 BPM | HEIGHT: 57 IN | BODY MASS INDEX: 33.44 KG/M2 | DIASTOLIC BLOOD PRESSURE: 82 MMHG | TEMPERATURE: 97.6 F | SYSTOLIC BLOOD PRESSURE: 126 MMHG | RESPIRATION RATE: 14 BRPM

## 2024-01-26 DIAGNOSIS — I50.32 CHRONIC HEART FAILURE WITH PRESERVED EJECTION FRACTION (HCC): ICD-10-CM

## 2024-01-26 DIAGNOSIS — R79.89 LOW VITAMIN B12 LEVEL: ICD-10-CM

## 2024-01-26 DIAGNOSIS — Z98.890 S/P MVR (MITRAL VALVE REPAIR): ICD-10-CM

## 2024-01-26 DIAGNOSIS — I34.1 MITRAL VALVE PROLAPSE: ICD-10-CM

## 2024-01-26 DIAGNOSIS — I10 BENIGN ESSENTIAL HYPERTENSION: ICD-10-CM

## 2024-01-26 DIAGNOSIS — E04.1 THYROID NODULE: ICD-10-CM

## 2024-01-26 DIAGNOSIS — E78.5 DYSLIPIDEMIA: ICD-10-CM

## 2024-01-26 DIAGNOSIS — J32.8 OTHER CHRONIC SINUSITIS: Primary | ICD-10-CM

## 2024-01-26 DIAGNOSIS — M81.8 IDIOPATHIC OSTEOPOROSIS: ICD-10-CM

## 2024-01-26 PROCEDURE — 99214 OFFICE O/P EST MOD 30 MIN: CPT | Performed by: INTERNAL MEDICINE

## 2024-01-26 PROCEDURE — 96372 THER/PROPH/DIAG INJ SC/IM: CPT | Performed by: INTERNAL MEDICINE

## 2024-01-26 RX ORDER — TORSEMIDE 10 MG/1
10 TABLET ORAL DAILY
Qty: 90 TABLET | Refills: 3 | Status: SHIPPED | OUTPATIENT
Start: 2024-01-26

## 2024-01-26 RX ORDER — LOSARTAN POTASSIUM 50 MG/1
50 TABLET ORAL DAILY
Qty: 100 TABLET | Refills: 3 | Status: SHIPPED | OUTPATIENT
Start: 2024-01-26

## 2024-01-26 RX ORDER — LANOLIN ALCOHOL/MO/W.PET/CERES
1000 CREAM (GRAM) TOPICAL DAILY
Qty: 90 TABLET | Refills: 3 | Status: SHIPPED | OUTPATIENT
Start: 2024-01-26

## 2024-01-26 RX ORDER — CAL/D3/MAG11/ZINC/COP/MANG/BOR 600 MG-800
1 TABLET ORAL 2 TIMES DAILY WITH MEALS
Qty: 200 TABLET | Refills: 6 | Status: SHIPPED | OUTPATIENT
Start: 2024-01-26

## 2024-01-26 RX ORDER — ATORVASTATIN CALCIUM 20 MG/1
20 TABLET, FILM COATED ORAL DAILY
Qty: 100 TABLET | Refills: 3 | Status: SHIPPED | OUTPATIENT
Start: 2024-01-26

## 2024-01-26 RX ORDER — CYANOCOBALAMIN 1000 UG/ML
1000 INJECTION, SOLUTION INTRAMUSCULAR; SUBCUTANEOUS ONCE
Status: COMPLETED | OUTPATIENT
Start: 2024-01-26 | End: 2024-01-26

## 2024-01-26 RX ORDER — METOPROLOL SUCCINATE 50 MG/1
50 TABLET, EXTENDED RELEASE ORAL DAILY
Qty: 90 TABLET | Refills: 3 | Status: SHIPPED | OUTPATIENT
Start: 2024-01-26

## 2024-01-26 RX ADMIN — CYANOCOBALAMIN 1000 MCG: 1000 INJECTION, SOLUTION INTRAMUSCULAR; SUBCUTANEOUS at 10:54

## 2024-01-26 NOTE — PATIENT INSTRUCTIONS

## 2024-01-26 NOTE — PROGRESS NOTES
Name: Rachelle Narayanan      : 1945      MRN: [id number]  Encounter Provider: Evangelist Shankar MD  Encounter Date: 2024   Encounter department: [facility] C[facility]    Assessment & Plan     1. Other chronic sinusitis  -     CT sinus wo contrast; Future; Expected date: 2024    2. Chronic heart failure with preserved ejection fraction (HCC)  Comments:  stable  continue same  FU w cardiology    3. Mitral valve prolapse  -     torsemide (DEMADEX) 10 mg tablet; Take 1 tablet (10 mg total) by mouth daily    4. S/P MVR (mitral valve repair)  -     torsemide (DEMADEX) 10 mg tablet; Take 1 tablet (10 mg total) by mouth daily  -     metoprolol succinate (TOPROL-XL) 50 mg 24 hr tablet; Take 1 tablet (50 mg total) by mouth daily In the evening daily    5. Benign essential hypertension  -     losartan (COZAAR) 50 mg tablet; Take 1 tablet (50 mg total) by mouth daily In the Morning..    6. Idiopathic osteoporosis  Comments:  Caltrate Plus D  daily, and Prolia Inj Q 6 mos  Orders:  -     Calcium Carbonate-Vit D-Min (Caltrate 600+D Plus Minerals) 600-800 MG-UNIT TABS; Take 1 tablet by mouth 2 (two) times a day with meals  -     denosumab (PROLIA) 60 mg/mL; Inject 1 mL (60 mg total) under the skin once for 1 dose    7. Dyslipidemia  -     atorvastatin (LIPITOR) 20 mg tablet; Take 1 tablet (20 mg total) by mouth daily    8. Thyroid nodule    9. BMI 33.0-33.9,adult    10. Low vitamin B12 level  -     cyanocobalamin injection 1,000 mcg  -     vitamin B-12 (VITAMIN B-12) 1,000 mcg tablet; Take 1 tablet (1,000 mcg total) by mouth daily    Life style mod  RTC in 3 mos w blood work       Subjective      78 Y O Lady is here for Regular check up, and has few symptoms, recent Blood work and med list reviewed  wpt,...      Review of Systems   Constitutional:  Negative for chills, fatigue and fever.   HENT:  Positive for congestion, postnasal drip, sinus pressure and sinus pain. Negative for facial  swelling, sore throat, trouble swallowing and voice change.    Eyes:  Negative for pain, discharge and visual disturbance.   Respiratory:  Negative for cough, shortness of breath and wheezing.    Cardiovascular:  Negative for chest pain, palpitations and leg swelling.   Gastrointestinal:  Negative for abdominal pain, blood in stool, constipation, diarrhea and nausea.   Endocrine: Negative for polydipsia, polyphagia and polyuria.   Genitourinary:  Negative for difficulty urinating, hematuria and urgency.   Musculoskeletal:  Negative for arthralgias and myalgias.   Skin:  Negative for rash.   Neurological:  Positive for dizziness and headaches. Negative for tremors and weakness.   Hematological:  Negative for adenopathy. Does not bruise/bleed easily.   Psychiatric/Behavioral:  Negative for dysphoric mood, sleep disturbance and suicidal ideas.        Current Outpatient Medications on File Prior to Visit   Medication Sig    aspirin (ECOTRIN LOW STRENGTH) 81 mg EC tablet Take 1 tablet (81 mg total) by mouth daily    Coenzyme Q10 10 MG capsule Take 1 capsule (10 mg total) by mouth daily    Diclofenac Sodium (VOLTAREN) 1 % Apply 2 g topically 4 (four) times a day    [DISCONTINUED] atorvastatin (LIPITOR) 20 mg tablet Take 1 tablet (20 mg total) by mouth daily    [DISCONTINUED] Calcium Carbonate-Vit D-Min (Caltrate 600+D Plus Minerals) 600-800 MG-UNIT TABS Take 1 tablet by mouth 2 (two) times a day with meals    [DISCONTINUED] losartan (COZAAR) 50 mg tablet Take 1 tablet (50 mg total) by mouth daily In the Morning..    [DISCONTINUED] metoprolol succinate (TOPROL-XL) 50 mg 24 hr tablet Take 1 tablet (50 mg total) by mouth daily In the evening daily    [DISCONTINUED] torsemide (DEMADEX) 10 mg tablet Take 1 tablet (10 mg total) by mouth daily    [DISCONTINUED] denosumab (PROLIA) 60 mg/mL Inject 1 mL (60 mg total) under the skin once for 1 dose       Objective     /82 (BP Location: Left arm, Patient Position: Sitting, Cuff  "Size: Standard)   Pulse 67   Temp 97.6 °F (36.4 °C) (Tympanic)   Resp 14   Ht 4' 9\" (1.448 m)   Wt 70.3 kg (155 lb)   SpO2 98%   BMI 33.54 kg/m²     Physical Exam  Constitutional:       General: She is not in acute distress.  HENT:      Head: Normocephalic.      Mouth/Throat:      Pharynx: No oropharyngeal exudate.   Eyes:      General: No scleral icterus.     Conjunctiva/sclera: Conjunctivae normal.      Pupils: Pupils are equal, round, and reactive to light.   Neck:      Thyroid: No thyromegaly.   Cardiovascular:      Rate and Rhythm: Normal rate and regular rhythm.      Heart sounds: Normal heart sounds. No murmur heard.  Pulmonary:      Effort: Pulmonary effort is normal. No respiratory distress.      Breath sounds: Normal breath sounds. No wheezing or rales.   Abdominal:      General: Bowel sounds are normal. There is no distension.      Palpations: Abdomen is soft.      Tenderness: There is no abdominal tenderness. There is no guarding or rebound.   Musculoskeletal:         General: No tenderness.      Cervical back: Neck supple.   Lymphadenopathy:      Cervical: No cervical adenopathy.   Skin:     Coloration: Skin is not pale.      Findings: No rash.   Neurological:      Mental Status: She is alert and oriented to person, place, and time.      Sensory: No sensory deficit.      Motor: No weakness.       Evangelist Shankar MD    "

## 2024-02-01 ENCOUNTER — HOSPITAL ENCOUNTER (OUTPATIENT)
Dept: CT IMAGING | Facility: HOSPITAL | Age: 79
Discharge: HOME | End: 2024-02-01
Payer: COMMERCIAL

## 2024-02-01 DIAGNOSIS — J32.8 OTHER CHRONIC SINUSITIS: ICD-10-CM

## 2024-02-01 PROCEDURE — 70486 CT MAXILLOFACIAL W/O DYE: CPT

## 2024-02-05 ENCOUNTER — OFFICE VISIT (OUTPATIENT)
Dept: CARDIOLOGY CLINIC | Facility: CLINIC | Age: 79
End: 2024-02-05
Payer: COMMERCIAL

## 2024-02-05 VITALS
OXYGEN SATURATION: 98 % | DIASTOLIC BLOOD PRESSURE: 78 MMHG | BODY MASS INDEX: 34.28 KG/M2 | HEIGHT: 57 IN | HEART RATE: 81 BPM | SYSTOLIC BLOOD PRESSURE: 118 MMHG | WEIGHT: 158.9 LBS

## 2024-02-05 DIAGNOSIS — Z98.890 S/P MVR (MITRAL VALVE REPAIR): ICD-10-CM

## 2024-02-05 DIAGNOSIS — I10 BENIGN ESSENTIAL HYPERTENSION: ICD-10-CM

## 2024-02-05 DIAGNOSIS — I34.1 MITRAL VALVE PROLAPSE: Primary | ICD-10-CM

## 2024-02-05 DIAGNOSIS — I35.1 NONRHEUMATIC AORTIC VALVE INSUFFICIENCY: ICD-10-CM

## 2024-02-05 DIAGNOSIS — E78.5 DYSLIPIDEMIA: ICD-10-CM

## 2024-02-05 DIAGNOSIS — I50.32 CHRONIC HEART FAILURE WITH PRESERVED EJECTION FRACTION (HCC): ICD-10-CM

## 2024-02-05 PROCEDURE — 99214 OFFICE O/P EST MOD 30 MIN: CPT | Performed by: INTERNAL MEDICINE

## 2024-02-05 NOTE — PROGRESS NOTES
Cardiology Follow-up    Rachelle Narayanan  [id number]  1945  [facility]  [address]  [place] [address]      1. Mitral valve prolapse        2. S/P MVR (mitral valve repair)        3. Nonrheumatic aortic valve insufficiency  Echo complete w/ contrast if indicated      4. Chronic heart failure with preserved ejection fraction (HCC)        5. Benign essential hypertension        6. Dyslipidemia              Discussion/Summary:  Mrs. Narayanan is a pleasant 79-year-old female who presents to the office today for routine follow-up.     She had a repeat echocardiogram by her cardiothoracic surgeon last year revealing a mitral valve repair with no stenosis and trace regurgitation.  She was also noted to have moderate aortic insufficiency.  I will request a repeat echocardiogram given her worsening shortness of breath.    Her blood pressure is well-controlled in the office today.  No changes were made to her medication regimen.  A low-salt diet was reinforced.    Her most recent lipids do reveal acceptable numbers on her current statin regimen to which no changes were advised.    I will see her back in the office in six months or sooner if deemed necessary.    History of Present Illness:  Mrs. Narayanan is a pleasant 79-year-old female who presents to the office today for routine follow-up.    Overall she has not been feeling all that well.  She has been struggling with dental and sinus infections for which she has been on and off of antibiotics.  She is scheduled to see the dentist in the near future and also a periodontist.     She continues with shortness of breath with exertion since her last visit which she thinks is slightly worse since her last visit.  She denies any exertional chest pain.  She reports lower extremity edema persistent throughout the day.  She will elevate her legs with improvement.   She denies any paroxysmal nocturnal dyspnea, orthopnea, acute weight gain or increasing abdominal girth.  She denies syncope or presyncope.  She denies palpitations or symptoms of claudication.    Patient Active Problem List   Diagnosis    Benign essential hypertension    Vitamin D insufficiency    Dyslipidemia    Other osteoporosis without current pathological fracture    Seasonal allergies    Elevated parathyroid hormone    Varicose veins of left lower extremity with pain    Bilateral lower extremity edema    S/P MVR (mitral valve repair)    Mitral valve prolapse    Chronic heart failure with preserved ejection fraction (HCC)    Age-related osteoporosis without current pathological fracture     Past Medical History:   Diagnosis Date    Arthritis     Cellulitis of face 10/16/2019    Bilateral nares    Family history of colon cancer in mother     Functional murmur     Hypercalcemia 11/21/2016    Hypertension     Dx'd in 2003, controlled     Osteoporosis     Screening for breast cancer 12/5/2018    SOB (shortness of breath) 7/31/2020    Viral upper respiratory tract infection 10/30/2018     Social History     Socioeconomic History    Marital status: /Civil Union     Spouse name: Not on file    Number of children: Not on file    Years of education: Not on file    Highest education level: Not on file   Occupational History    Not on file   Tobacco Use    Smoking status: Never     Passive exposure: Past    Smokeless tobacco: Never   Vaping Use    Vaping status: Never Used   Substance and Sexual Activity    Alcohol use: Not Currently     Comment: Rarely     Drug use: No    Sexual activity: Not Currently     Partners: Male     Birth control/protection: None   Other Topics Concern    Not on file   Social History Narrative    Caffeine use via coffee, 1 serv/day    Exercises regularly, walking      Social Determinants of Health     Financial Resource Strain: Low Risk  (5/11/2023)    Overall Financial Resource Strain  (CARDIA)     Difficulty of Paying Living Expenses: Not hard at all   Food Insecurity: Not on file   Transportation Needs: No Transportation Needs (2023)    PRAPARE - Transportation     Lack of Transportation (Medical): No     Lack of Transportation (Non-Medical): No   Physical Activity: Not on file   Stress: Not on file   Social Connections: Not on file   Intimate Partner Violence: Not on file   Housing Stability: Not on file      Family History   Problem Relation Age of Onset    Colon cancer Mother 73    Aneurysm Father     No Known Problems Sister     No Known Problems Paternal Aunt     No Known Problems Paternal Aunt     No Known Problems Paternal Aunt     No Known Problems Paternal Aunt     No Known Problems Maternal Grandmother     No Known Problems Maternal Grandfather     No Known Problems Paternal Grandmother     No Known Problems Paternal Grandfather     No Known Problems Daughter     No Known Problems Son     No Known Problems Son      Past Surgical History:   Procedure Laterality Date    CARDIAC SURGERY       SECTION      x2,  &     FLEXIBLE SIGMOIDOSCOPY      MITRAL VALVE REPAIR      SD COLONOSCOPY FLX DX W/COLLJ SPEC WHEN PFRMD N/A 10/10/2016    Procedure: COLONOSCOPY;  Surgeon: Patsy Escobedo MD;  Location: [facility];  Service: Gastroenterology    TOE SURGERY Bilateral     hammer toe correction surgery    TONSILLECTOMY      with Adenoidectomy       Current Outpatient Medications:     aspirin (ECOTRIN LOW STRENGTH) 81 mg EC tablet, Take 1 tablet (81 mg total) by mouth daily, Disp: 90 tablet, Rfl: 3    atorvastatin (LIPITOR) 20 mg tablet, Take 1 tablet (20 mg total) by mouth daily, Disp: 100 tablet, Rfl: 3    Calcium Carbonate-Vit D-Min (Caltrate 600+D Plus Minerals) 600-800 MG-UNIT TABS, Take 1 tablet by mouth 2 (two) times a day with meals, Disp: 200 tablet, Rfl: 6    Coenzyme Q10 10 MG capsule, Take 1 capsule (10 mg total) by mouth daily, Disp: 90 capsule, Rfl: 3    denosumab  "(PROLIA) 60 mg/mL, Inject 1 mL (60 mg total) under the skin once for 1 dose, Disp: 1 mL, Rfl: 1    Diclofenac Sodium (VOLTAREN) 1 %, Apply 2 g topically 4 (four) times a day, Disp: 200 g, Rfl: 3    losartan (COZAAR) 50 mg tablet, Take 1 tablet (50 mg total) by mouth daily In the Morning.., Disp: 100 tablet, Rfl: 3    metoprolol succinate (TOPROL-XL) 50 mg 24 hr tablet, Take 1 tablet (50 mg total) by mouth daily In the evening daily, Disp: 90 tablet, Rfl: 3    torsemide (DEMADEX) 10 mg tablet, Take 1 tablet (10 mg total) by mouth daily, Disp: 90 tablet, Rfl: 3    vitamin B-12 (VITAMIN B-12) 1,000 mcg tablet, Take 1 tablet (1,000 mcg total) by mouth daily, Disp: 90 tablet, Rfl: 3  Allergies   Allergen Reactions    Poison Ivy Extract Rash         Imaging: No results found.  Review of Systems:  Review of Systems   Respiratory:  Positive for shortness of breath.    Cardiovascular:  Positive for leg swelling. Negative for chest pain.   All other systems reviewed and are negative.        Vitals:    02/05/24 1344 02/05/24 1418   BP: 100/60 118/78   BP Location: Left arm    Patient Position: Sitting    Cuff Size: Large    Pulse: 81    SpO2: 98%    Weight: 72.1 kg (158 lb 14.4 oz)    Height: 4' 9\" (1.448 m)        Vitals:    02/05/24 1344   Weight: 72.1 kg (158 lb 14.4 oz)       Height: 4' 9\" (144.8 cm)     Physical Exam:  General:  Alert and cooperative, appears stated age  HEENT:  PERRLA, EOMI, no scleral icterus, no conjunctival pallor  Neck:  No lymphadenopathy, no thyromegaly, no carotid bruits, no elevated JVP  Heart:  Regular rate and rhythm, normal S1/S2, no S3/S4, no murmur  Lungs:  Clear to auscultation bilaterally   Abdomen:  Soft, non-tender, positive bowel sounds, no rebound or guarding,   no organomegaly   Extremities: Positive pedal edema   Vascular:  2+ pedal pulses  Skin:  No rashes or lesions on exposed skin  Neurologic:  Cranial nerves II-XII grossly intact without focal deficits  "

## 2024-02-09 ENCOUNTER — HOSPITAL ENCOUNTER (OUTPATIENT)
Dept: NON INVASIVE DIAGNOSTICS | Facility: CLINIC | Age: 79
Discharge: HOME | End: 2024-02-09
Payer: COMMERCIAL

## 2024-02-09 VITALS
HEART RATE: 81 BPM | WEIGHT: 158 LBS | HEIGHT: 57 IN | SYSTOLIC BLOOD PRESSURE: 118 MMHG | DIASTOLIC BLOOD PRESSURE: 78 MMHG | BODY MASS INDEX: 34.09 KG/M2

## 2024-02-09 DIAGNOSIS — I35.1 NONRHEUMATIC AORTIC VALVE INSUFFICIENCY: ICD-10-CM

## 2024-02-09 LAB
AORTIC ROOT: 3.5 CM
APICAL FOUR CHAMBER EJECTION FRACTION: 59 %
ASCENDING AORTA: 3.5 CM
AV REGURGITATION PRESSURE HALF TIME: 438 MS
BSA FOR ECHO PROCEDURE: 1.63 M2
DOP CALC MV VTI: 30.15 CM
E WAVE DECELERATION TIME: 210 MS
E/A RATIO: 0.63
FRACTIONAL SHORTENING: 35 (ref 28–44)
INTERVENTRICULAR SEPTUM IN DIASTOLE (PARASTERNAL SHORT AXIS VIEW): 1.1 CM
INTERVENTRICULAR SEPTUM: 1.1 CM (ref 0.6–1.1)
LAAS-AP2: 17.3 CM2
LAAS-AP4: 17.9 CM2
LEFT ATRIUM SIZE: 4.4 CM
LEFT ATRIUM VOLUME (MOD BIPLANE): 49 ML
LEFT ATRIUM VOLUME INDEX (MOD BIPLANE): 30.1 ML/M2
LEFT INTERNAL DIMENSION IN SYSTOLE: 3 CM (ref 2.1–4)
LEFT VENTRICULAR INTERNAL DIMENSION IN DIASTOLE: 4.6 CM (ref 3.5–6)
LEFT VENTRICULAR POSTERIOR WALL IN END DIASTOLE: 1.2 CM
LEFT VENTRICULAR STROKE VOLUME: 61 ML
LVSV (TEICH): 61 ML
MV E'TISSUE VEL-LAT: 7 CM/S
MV E'TISSUE VEL-SEP: 6 CM/S
MV MEAN GRADIENT: 2 MMHG
MV PEAK A VEL: 1.28 M/S
MV PEAK E VEL: 81 CM/S
MV PEAK GRADIENT: 7 MMHG
MV STENOSIS PRESSURE HALF TIME: 61 MS
MV VALVE AREA P 1/2 METHOD: 3.61
RA PRESSURE ESTIMATED: 3 MMHG
RIGHT ATRIUM AREA SYSTOLE A4C: 18.1 CM2
RIGHT VENTRICLE ID DIMENSION: 3.9 CM
RV PSP: 29 MMHG
SL CV AV DECELERATION TIME RETROGRADE: 1510 MS
SL CV AV PEAK GRADIENT RETROGRADE: 61 MMHG
SL CV LEFT ATRIUM LENGTH A2C: 4.8 CM
SL CV LV EF: 60
SL CV PED ECHO LEFT VENTRICLE DIASTOLIC VOLUME (MOD BIPLANE) 2D: 95 ML
SL CV PED ECHO LEFT VENTRICLE SYSTOLIC VOLUME (MOD BIPLANE) 2D: 35 ML
TR MAX PG: 26 MMHG
TR PEAK VELOCITY: 2.5 M/S
TRICUSPID ANNULAR PLANE SYSTOLIC EXCURSION: 1.6 CM
TRICUSPID VALVE PEAK REGURGITATION VELOCITY: 2.54 M/S

## 2024-02-09 PROCEDURE — 93306 TTE W/DOPPLER COMPLETE: CPT | Performed by: INTERNAL MEDICINE

## 2024-02-09 PROCEDURE — 93306 TTE W/DOPPLER COMPLETE: CPT

## 2024-03-21 ENCOUNTER — OFFICE VISIT (OUTPATIENT)
Dept: OBGYN CLINIC | Facility: CLINIC | Age: 79
End: 2024-03-21
Payer: COMMERCIAL

## 2024-03-21 VITALS
DIASTOLIC BLOOD PRESSURE: 82 MMHG | WEIGHT: 158 LBS | HEART RATE: 93 BPM | SYSTOLIC BLOOD PRESSURE: 130 MMHG | HEIGHT: 57 IN | BODY MASS INDEX: 34.09 KG/M2

## 2024-03-21 DIAGNOSIS — M17.12 PRIMARY OSTEOARTHRITIS OF LEFT KNEE: Primary | ICD-10-CM

## 2024-03-21 DIAGNOSIS — M19.079 ARTHRITIS OF MIDFOOT: ICD-10-CM

## 2024-03-21 PROCEDURE — 99214 OFFICE O/P EST MOD 30 MIN: CPT | Performed by: STUDENT IN AN ORGANIZED HEALTH CARE EDUCATION/TRAINING PROGRAM

## 2024-03-21 PROCEDURE — 20610 DRAIN/INJ JOINT/BURSA W/O US: CPT | Performed by: STUDENT IN AN ORGANIZED HEALTH CARE EDUCATION/TRAINING PROGRAM

## 2024-03-21 RX ORDER — BUPIVACAINE HYDROCHLORIDE 2.5 MG/ML
4 INJECTION, SOLUTION INFILTRATION; PERINEURAL
Status: COMPLETED | OUTPATIENT
Start: 2024-03-21 | End: 2024-03-21

## 2024-03-21 RX ORDER — METHYLPREDNISOLONE ACETATE 40 MG/ML
1 INJECTION, SUSPENSION INTRA-ARTICULAR; INTRALESIONAL; INTRAMUSCULAR; SOFT TISSUE
Status: COMPLETED | OUTPATIENT
Start: 2024-03-21 | End: 2024-03-21

## 2024-03-21 RX ADMIN — METHYLPREDNISOLONE ACETATE 1 ML: 40 INJECTION, SUSPENSION INTRA-ARTICULAR; INTRALESIONAL; INTRAMUSCULAR; SOFT TISSUE at 11:00

## 2024-03-21 RX ADMIN — BUPIVACAINE HYDROCHLORIDE 4 ML: 2.5 INJECTION, SOLUTION INFILTRATION; PERINEURAL at 11:00

## 2024-03-27 ENCOUNTER — OFFICE VISIT (OUTPATIENT)
Dept: OBGYN CLINIC | Facility: CLINIC | Age: 79
End: 2024-03-27
Payer: COMMERCIAL

## 2024-03-27 ENCOUNTER — APPOINTMENT (OUTPATIENT)
Dept: RADIOLOGY | Facility: AMBULARY SURGERY CENTER | Age: 79
End: 2024-03-27
Attending: ORTHOPAEDIC SURGERY
Payer: COMMERCIAL

## 2024-03-27 VITALS
HEIGHT: 57 IN | BODY MASS INDEX: 32.58 KG/M2 | DIASTOLIC BLOOD PRESSURE: 80 MMHG | HEART RATE: 70 BPM | WEIGHT: 151 LBS | SYSTOLIC BLOOD PRESSURE: 130 MMHG

## 2024-03-27 DIAGNOSIS — M21.6X2 CAVOVARUS DEFORMITY OF FOOT, ACQUIRED, LEFT: ICD-10-CM

## 2024-03-27 DIAGNOSIS — Z01.89 ENCOUNTER FOR LOWER EXTREMITY COMPARISON IMAGING STUDY: ICD-10-CM

## 2024-03-27 DIAGNOSIS — M79.672 PAIN OF LEFT FOOT: ICD-10-CM

## 2024-03-27 DIAGNOSIS — M19.079 ARTHRITIS OF MIDFOOT: ICD-10-CM

## 2024-03-27 DIAGNOSIS — M79.672 PAIN IN LEFT FOOT: ICD-10-CM

## 2024-03-27 DIAGNOSIS — M19.072 ARTHRITIS OF LEFT MIDFOOT: Primary | ICD-10-CM

## 2024-03-27 PROCEDURE — 73630 X-RAY EXAM OF FOOT: CPT

## 2024-03-27 PROCEDURE — 99213 OFFICE O/P EST LOW 20 MIN: CPT | Performed by: ORTHOPAEDIC SURGERY

## 2024-03-27 PROCEDURE — 73620 X-RAY EXAM OF FOOT: CPT

## 2024-03-27 RX ORDER — AMOXICILLIN 500 MG/1
500 CAPSULE ORAL EVERY 8 HOURS SCHEDULED
COMMUNITY
Start: 2024-03-25

## 2024-03-27 RX ORDER — CHLORHEXIDINE GLUCONATE ORAL RINSE 1.2 MG/ML
15 SOLUTION DENTAL 2 TIMES DAILY
COMMUNITY
Start: 2024-03-25

## 2024-03-27 NOTE — PROGRESS NOTES
James R Lachman, M.D.  Attending, Orthopaedic Surgery  Foot and Ankle  [facility]      ORTHOPAEDIC FOOT AND ANKLE CLINIC VISIT     Assessment:     Encounter Diagnoses   Name Primary?    Arthritis of midfoot     Pain of left foot     Cavovarus deformity of foot, acquired, left     Encounter for lower extremity comparison imaging study     Arthritis of left midfoot Yes        Plan:   The patient verbalized understanding of exam findings and treatment plan. We engaged in the shared decision-making process and treatment options were discussed at length with the patient. Surgical and conservative management discussed today along with risks and benefits.  Referred by Dr. Saavedra for worsening left foot pain, has midfoot OA   Has remote h/o of left foot and left ankle fractures 10+ years ago, managed non-operatively  Xrays left foot reveal severe 2nd and 3rd TMT arthritis   Recommend carbon fiber foot plate, supportive shoes   Corticosteroid injection under fluoroscopic ordered  Surgical option would be TMT fusion  Return if the injections wear off prior to 3 months, you may require a surgery and should RTC.      History of Present Illness:   Chief Complaint:   Chief Complaint   Patient presents with    Left Foot - Pain     Has bumps on her foot. On the top and on the side she has no feeling on the outside.      Rachelle Narayanan is a 79 y.o. female who is being seen for left foot pain. Retired 2021, pain has been worsening since. Pain is localized at dorsal aspect of foot with minimal radiating and described as sharp and severe. Patient does report numbness/tingling of lateral foot, daughter also reports patient sometimes has radicular pain down left leg. Takes tylenol, 6-8 500 mg tablets daily. Denies history of neuropathy.  Patient does not smoke, does not have diabetes and does take blood thinners, ASA 81 mg daily.  Patient denies family history of anesthesia complications and has not had any  complications with anesthesia.     Pain/symptom timing:  Worse during the day when active  Pain/symptom context:  Worse with activites and work  Pain/symptom modifying factors:  Rest makes better, activities make worse  Pain/symptom associated signs/symptoms: none    Prior treatment   NSAIDsNo   Injections No   Bracing/Orthotics No    Physical Therapy No     Orthopedic Surgical History:   See below    Past Medical, Surgical and Social History:  Past Medical History:  has a past medical history of Arthritis, Cellulitis of face (10/16/2019), Family history of colon cancer in mother, Functional murmur, Hypercalcemia (2016), Hypertension, Osteoporosis, Screening for breast cancer (2018), SOB (shortness of breath) (2020), and Viral upper respiratory tract infection (10/30/2018).  Problem List: does not have any pertinent problems on file.  Past Surgical History:  has a past surgical history that includes  section; Toe Surgery (Bilateral); Flexible sigmoidoscopy; Tonsillectomy; pr colonoscopy flx dx w/collj spec when pfrmd (N/A, 10/10/2016); Mitral valve repair; and Cardiac surgery.  Family History: family history includes Aneurysm in her father; Colon cancer (age of onset: 73) in her mother; No Known Problems in her daughter, maternal grandfather, maternal grandmother, paternal aunt, paternal aunt, paternal aunt, paternal aunt, paternal grandfather, paternal grandmother, sister, son, and son.  Social History:  reports that she has never smoked. She has been exposed to tobacco smoke. She has never used smokeless tobacco. She reports that she does not currently use alcohol. She reports that she does not use drugs.  Current Medications: has a current medication list which includes the following prescription(s): amoxicillin, aspirin, atorvastatin, caltrate 600+d plus minerals, chlorhexidine, coenzyme q10, diclofenac sodium, losartan, metoprolol succinate, torsemide, vitamin b-12, and  "denosumab.  Allergies: is allergic to poison ivy extract.     Review of Systems:  General- denies fever/chills  HEENT- denies hearing loss or sore throat  Eyes- denies eye pain or visual disturbances, denies red eyes  Respiratory- denies cough or SOB  Cardio- denies chest pain or palpitations  GI- denies abdominal pain  Endocrine- denies urinary frequency  Urinary- denies pain with urination  Musculoskeletal- Negative except noted above  Skin- denies rashes or wounds  Neurological- denies dizziness or headache  Psychiatric- denies anxiety or difficulty concentrating    Physical Exam:   /80   Pulse 70   Ht 4' 9\" (1.448 m)   Wt 68.5 kg (151 lb)   BMI 32.68 kg/m²   General/Constitutional: No apparent distress: well-nourished and well developed.  Eyes: normal ocular motion  Cardio: RRR, Normal S1S2, No m/r/g  Lymphatic: No appreciable lymphadenopathy  Respiratory: Non-labored breathing, CTA b/l no w/c/r  Vascular: No edema, swelling or tenderness, except as noted in detailed exam.  Integumentary: No impressive skin lesions present, except as noted in detailed exam.  Neuro: No ataxia or tremors noted  Psych: Normal mood and affect, oriented to person, place and time. Appropriate affect.  Musculoskeletal: Normal, except as noted in detailed exam and in HPI.    Examination    Left    Gait Antalgic. Cavovarus deformity   Musculoskeletal Tender to palpation at dorsal aspect of foot, TMT joint. Also TTP along 5th metatarsal joint     Skin Normal.      Nails Normal    Range of Motion  20 degrees dorsiflexion, 30 degrees plantarflexion  Subtalar motion: normal    Stability Stable    Muscle Strength 5/5 tibialis anterior  5/5 gastrocnemius-soleus  5/5 posterior tibialis  3/5 peroneal/eversion strength  5/5 EHL  5/5 FHL    Neurologic Normal    Sensation Intact to light touch throughout sural, saphenous, superficial peroneal, deep peroneal and medial/lateral plantar nerve distributions.  [place] 5.07 filament " (10g) testing  deferred.    Cardiovascular Brisk capillary refill < 2 seconds,intact DP and PT pulses    Special Tests None      Imaging Studies:   3 views of the left foot were taken, reviewed and interpreted independently that demonstrate severe 2nd and 3rd TMT joint arthritis. Reviewed by me personally.    Scribe Attestation      I,:  Sheryl Lu PA-C am acting as a scribe while in the presence of the attending physician.:       I,:  James R Lachman, MD personally performed the services described in this documentation    as scribed in my presence.:           James R. Lachman, MD  Foot & Ankle Surgery   Department of Orthopaedic Surgery  [facility]      I personally performed the service.    James R. Lachman, MD

## 2024-03-27 NOTE — PATIENT INSTRUCTIONS
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 This is a wearing away of the cartilage in your foot leading to bone against bone which causes inflammation which causes pain.    The nonoperative treatment for this involves supportive shoes, a carbon fiber foot plate and injections under fluoroscopic guidance. You may schedule your injection with our musculoskeletal radiology team at your earliest convenience.    Go to [email or web address] and type in \"Full length carbon fiber foot plate.\" Pick an item that looks like the one below.  Make sure it is the appropriate size for your foot (match your shoe size).  This insert goes under the orthotic in your shoe to stiffen the shoe.      If the injections and carbon fiber foot plate are not effective, you will be a candidate for a midfoot fusion surgery.  Midfoot Fusion     What is the midfoot?  The midfoot refers to the bones and joints that make up the arch and connect the forefoot (which includes the bones of the toes) to the hindfoot (which includes the ankle bone and the heel bone).     What is a midfoot fusion?    Midfoot fusion is a procedure in which the separate bones that make up the arch of the foot are fused into a single mass of bone. Fusion is also referred to as arthrodesis. Fusion eliminates the normal motion that occurs between two bones.      At left, this standing X-ray view of a right foot shows a gap between the first and second metatarsals (arrow). At right, this side X-ray view of the same foot shows loss of alignment. In a healthy foot, the two yellow lines would overlap one another.           Midfoot fusion can involve all of the midfoot joints. More commonly just one or a few of the joints are fused. The joints of the midfoot do not bend and move like your knee or elbow. They are designed to be relatively stiff to give your foot strength and support your body. Midfoot fusion does not generally produce much noticeable loss of motion " because there is fairly little motion to begin with.        What are the goals of a midfoot fusion?  The primary goal of midfoot fusion is to decrease pain and improve function. Eliminating the painful motion between arthritic joint surfaces and restoring the bones to their normal positions achieves this. Other goals include the correction of deformity and the return of stability to the arch of the foot. A successful midfoot fusion can achieve these goals and restore more normal walking ability.      What signs indicate a midfoot fusion may be needed?  The most common reason for midfoot fusion is painful arthritis in the midfoot joints that has not improved with nonsurgical treatment. Other common reasons to do a midfoot fusion include too much motion of one or more of the midfoot joints or deformity of the midfoot. Examples of conditions that may result in midfoot deformity include severe bunions and flatfoot deformity. Midfoot fusion is also indicated for certain acute fractures and joint displacement involving the midfoot.       When should I avoid surgery?  Midfoot fusion should not be performed if there is active infection or if the patient’s health is poor enough that the risk of surgery is too high. Conditions such as uncontrolled diabetes and blood flow problems may make a patient a poor candidate for surgery. Other reasons to not perform midfoot fusion include osteoporosis and poor skin quality. Smoking significantly increases the risk that bones will not fuse.      General Details of Procedure  Successful midfoot fusion depends on complete removal of all joint surfaces (cartilage) and stable fixation of the joints being fused. Residual cartilage can prevent the bones from fusing together. Failure to achieve adequate stability may allow too           Post-surgery X-rays showing correction. The arrow shows that the gap has been eliminated, and a single line can be drawn through the middle of the foot bones,  which means the foot is now in the proper position. Screws and plates were used for the repair.     much motion for fusion to occur.       Specific Techniques  Midfoot fusion is generally accomplished using one or two incisions on the top of the foot. The length of the incision and how many incisions are necessary is determined by the number of joints to be fused. Careful attention is paid to protecting tendons and nerves.     Stability is achieved during midfoot fusion using metal implants such as screws and plates. These are designed to immobilize the joints and allow for the formation of bone across the joint space. This process may involve the addition of bone graft material to fill any gaps that might exist between the bones after the cartilage has been removed. This bone graft material may be taken from another location in the patient’s body (autograft). It may also come from donated bone (allograft) or from a synthetic material. A combination of these materials may be used.      What happens after surgery?  After surgery a period of protection and immobilization is required for successful fusion to occur. A cast is typically placed for the first six to 10 weeks. Weightbearing is not allowed on the affected foot for eight to 12 weeks after surgery. X-rays are usually obtained every four weeks to assess progress of the fusion.     Gradually increased weightbearing is allowed as healing progresses. Initial weightbearing is protected in a prefabricated boot with gradual transition to supportive shoes. Physical therapy may be prescribed on a case-by-case basis to help the patient’s walking and balance.       Potential Complications  There are complications that relate to surgery in general. These include risks associated with anesthesia, infection, damage to nerves and blood vessels, and bleeding or blood clots.     A major potential complication after midfoot fusion is failure of the bones to fuse (nonunion).  Other complications can include over-correction or under-correction of deformity (malunion). There can be problems with wound healing. Prominent plates and screws can be painful and may require removal of the hardware. Injury to nerves on the top of the foot can occur.      Smoking is one of the leading risks for nonunion. Premature weightbearing can also result in failure of the bones to fuse.     Frequently Asked Questions  How much motion in my foot will I lose after midfoot fusion?  Motion of the midfoot joints is normally somewhat limited. Loss of that motion after fusion surgery tends to be well tolerated by patients. The more mobile joints of the ankle, hindfoot and forefoot are unaffected by midfoot fusion and thus continue to provide motion to the foot.     Will I set off an airport metal detector after midfoot fusion?  The strength of the metal detector and the amount of metal implants used determine whether hardware from a midfoot fusion will be detected. It is uncommon for the metal implants to be detectable by airport screening methods.      How will I get around after surgery before I am allowed to put any weight on the foot?  A combination of devices can be used, including crutches, walkers, knee-rollers, scooters and wheelchairs. Physical therapy is also used to help assess patient needs and improve mobility and safety. Certain patients may benefit from the assistance provided by a skilled nursing facility or post-operative rehabilitation unit.     Will the plates and screws have to be removed after midfoot fusion?  Metal implants used for midfoot fusion are not routinely removed. Hardware may need to be removed if there is a failure of the fusion or if infection develops. Painful hardware can be removed once the fusion is healed.

## 2024-03-28 NOTE — NURSING NOTE
Call placed to patient to discuss upcoming appointment at [facility] radiology department and complete consultation with patient. Patient is having a left foot CSI utilizing fluoroscopy  guidance. Reviewed patient's allergies, current anticoagulant medication of ASA 81 mg present per patient but not required to stop per periprocedural management of coagulation status and hemostasis risk in percutaneous image guided procedure guidelines, also discussed the pre and post procedure expectations. She asked me if she should take antibiotics for her mitral heart valve she sometimes takes it to protect her, I directed her to call and ask the question to her cardiologist. Reminded patient of location and time expected for procedure, Patient expressed understanding by verbalizing and repeating instructions.

## 2024-04-05 ENCOUNTER — HOSPITAL ENCOUNTER (OUTPATIENT)
Dept: RADIOLOGY | Facility: HOSPITAL | Age: 79
Discharge: HOME | End: 2024-04-05
Attending: ORTHOPAEDIC SURGERY
Payer: COMMERCIAL

## 2024-04-05 DIAGNOSIS — M19.072 ARTHRITIS OF LEFT MIDFOOT: ICD-10-CM

## 2024-04-05 PROCEDURE — 20600 DRAIN/INJ JOINT/BURSA W/O US: CPT

## 2024-04-05 PROCEDURE — 77002 NEEDLE LOCALIZATION BY XRAY: CPT

## 2024-04-05 RX ORDER — BETAMETHASONE SODIUM PHOSPHATE AND BETAMETHASONE ACETATE 3; 3 MG/ML; MG/ML
12 INJECTION, SUSPENSION INTRA-ARTICULAR; INTRALESIONAL; INTRAMUSCULAR; SOFT TISSUE ONCE
Qty: 2 ML | Refills: 0 | Status: COMPLETED | OUTPATIENT
Start: 2024-04-05 | End: 2024-04-05

## 2024-04-05 RX ORDER — ROPIVACAINE HYDROCHLORIDE 2 MG/ML
10 INJECTION, SOLUTION EPIDURAL; INFILTRATION; PERINEURAL ONCE
Status: COMPLETED | OUTPATIENT
Start: 2024-04-05 | End: 2024-04-05

## 2024-04-05 RX ADMIN — IOHEXOL 0.1 ML: 300 INJECTION, SOLUTION INTRAVENOUS at 14:40

## 2024-04-05 RX ADMIN — BETAMETHASONE SODIUM PHOSPHATE AND BETAMETHASONE ACETATE 1 MG: 3; 3 INJECTION, SUSPENSION INTRA-ARTICULAR; INTRALESIONAL; INTRAMUSCULAR at 14:35

## 2024-04-05 RX ADMIN — ROPIVACAINE HYDROCHLORIDE 1 ML: 2 INJECTION, SOLUTION EPIDURAL; INFILTRATION at 14:40

## 2024-04-15 ENCOUNTER — OFFICE VISIT (OUTPATIENT)
Dept: OBGYN CLINIC | Facility: CLINIC | Age: 79
End: 2024-04-15
Payer: COMMERCIAL

## 2024-04-15 ENCOUNTER — APPOINTMENT (OUTPATIENT)
Dept: RADIOLOGY | Facility: AMBULARY SURGERY CENTER | Age: 79
End: 2024-04-15
Payer: COMMERCIAL

## 2024-04-15 VITALS
BODY MASS INDEX: 32.68 KG/M2 | DIASTOLIC BLOOD PRESSURE: 79 MMHG | SYSTOLIC BLOOD PRESSURE: 130 MMHG | HEART RATE: 71 BPM | WEIGHT: 151 LBS

## 2024-04-15 DIAGNOSIS — M54.50 CHRONIC LEFT-SIDED LOW BACK PAIN WITHOUT SCIATICA: ICD-10-CM

## 2024-04-15 DIAGNOSIS — M25.552 PAIN IN LEFT HIP: ICD-10-CM

## 2024-04-15 DIAGNOSIS — G89.29 CHRONIC LEFT-SIDED LOW BACK PAIN WITHOUT SCIATICA: Primary | ICD-10-CM

## 2024-04-15 DIAGNOSIS — G89.29 CHRONIC LEFT-SIDED LOW BACK PAIN WITHOUT SCIATICA: ICD-10-CM

## 2024-04-15 DIAGNOSIS — M54.50 CHRONIC LEFT-SIDED LOW BACK PAIN WITHOUT SCIATICA: Primary | ICD-10-CM

## 2024-04-15 PROCEDURE — 73502 X-RAY EXAM HIP UNI 2-3 VIEWS: CPT

## 2024-04-15 PROCEDURE — 99214 OFFICE O/P EST MOD 30 MIN: CPT | Performed by: PHYSICAL MEDICINE & REHABILITATION

## 2024-04-15 PROCEDURE — 72100 X-RAY EXAM L-S SPINE 2/3 VWS: CPT

## 2024-04-15 NOTE — PROGRESS NOTES
1. Chronic left-sided low back pain without sciatica    2. Pain in left hip      Orders Placed This Encounter   Procedures    XR spine lumbar 2 or 3 views injury    XR hip/pelv 2-3 vws left if performed    Ambulatory referral to Physical Therapy     No orders of the defined types were placed in this encounter.      Impression:  Left hip and lumbar pain likely multifactorial and secondary to lumbar degenerative disc disease/facet hypertrophy, greater trochanteric pain syndrome and mild left hip osteoarthritis.  We discussed different treatment options and decided to proceed with physical therapy to address pain that is multifactorial.  We discussed injection treatment today including hip joint versus greater trochanteric region.  We decided to hold off.  She will try topical patches and Tylenol.  I will see her back in 5 weeks to reassess.    Imaging Studies (I personally reviewed images in PACS and report):  Left hip x-rays most recent to this encounter reviewed.  These images show minimal left hip osteoarthritis.  There is a radiodensity seen in the pelvis on frontal view.    Lumbar spine x-rays show curvature in the spine along with multilevel degenerative disc disease and significant facet hypertrophy.  There is also a radiodensity in the pelvis seen on the frontal view.    Return in about 5 weeks (around 5/20/2024).    Patient is in agreement with the above plan.    HPI:  Rachelle Narayanan is a 79 y.o. female  who presents for evaluation of   Chief Complaint   Patient presents with    Lower Back - Pain     Pt presents with complaints of chronic left sided lower back pain as well as left groin pain.     Left Hip - Pain        As we       Onset/Mechanism: Pain in the left low back and into the groin.  This is chronic and she denies a recent injury.  This has been ongoing for a couple of years.    Location: As above.  Radiation: As above.  Provocative: Inactivity to activity.  Severity: Good days and bad  days.  Associated Symptoms: As above.  Treatment so far: Activity modification.    Following history reviewed and updated:  Past Medical History:   Diagnosis Date    Arthritis     Cellulitis of face 10/16/2019    Bilateral nares    Family history of colon cancer in mother     Functional murmur     Hypercalcemia 2016    Hypertension     Dx'd in , controlled     Osteoporosis     Screening for breast cancer 2018    SOB (shortness of breath) 2020    Viral upper respiratory tract infection 10/30/2018     Past Surgical History:   Procedure Laterality Date    CARDIAC SURGERY       SECTION      x2, 1972 &     FL GUIDED NEEDLE PLAC BX/ASP/INJ  2024    FLEXIBLE SIGMOIDOSCOPY      MITRAL VALVE REPAIR      WA COLONOSCOPY FLX DX W/COLLJ SPEC WHEN PFRMD N/A 10/10/2016    Procedure: COLONOSCOPY;  Surgeon: Patsy Escobedo MD;  Location: [facility];  Service: Gastroenterology    TOE SURGERY Bilateral     hammer toe correction surgery    TONSILLECTOMY      with Adenoidectomy     Social History   Social History     Substance and Sexual Activity   Alcohol Use Not Currently    Comment: Rarely      Social History     Substance and Sexual Activity   Drug Use No     Social History     Tobacco Use   Smoking Status Never    Passive exposure: Past   Smokeless Tobacco Never     Family History   Problem Relation Age of Onset    Colon cancer Mother 73    Aneurysm Father     No Known Problems Sister     No Known Problems Paternal Aunt     No Known Problems Paternal Aunt     No Known Problems Paternal Aunt     No Known Problems Paternal Aunt     No Known Problems Maternal Grandmother     No Known Problems Maternal Grandfather     No Known Problems Paternal Grandmother     No Known Problems Paternal Grandfather     No Known Problems Daughter     No Known Problems Son     No Known Problems Son      Allergies   Allergen Reactions    Poison Ivy Extract Rash        Constitutional:  /79   Pulse 71   Wt 68.5 kg  (151 lb)   BMI 32.68 kg/m²    General: NAD.  Eyes: Anicteric sclerae.  Neck: Supple.  Lungs: Unlabored breathing.  Cardiovascular: No lower extremity edema.  Skin: Intact without erythema.  Neurologic: Sensation intact to light touch.  Psychiatric: Mood and affect are appropriate.    Left Hip Exam     Tenderness   The patient is experiencing tenderness in the greater trochanter.    Tests   NICOLA: positive    Other   Erythema: absent  Scars: absent  Sensation: normal  Pulse: present    Comments:  Pain with Stinchfield and FADDIR.  No pain with passive hip IR.               Procedures

## 2024-04-15 NOTE — PATIENT INSTRUCTIONS
You will be starting physical therapy.      It is important to do home exercises as given by your physical therapist as you go through PT to speed up your recovery.    Physical therapy addresses the problems that are causing your pain, instead of covering them up, as some other treatment options do.     Over-the-counter salonpas patches can be applied to the area of discomfort to help with pain.  There are two types of patches; one contains lidocaine 4% and the other contains menthol (and sometimes camphor and methyl salicylate).  You can try one or the other and see which one works best for you.

## 2024-04-18 ENCOUNTER — RA CDI HCC (OUTPATIENT)
Dept: OTHER | Facility: HOSPITAL | Age: 79
End: 2024-04-18

## 2024-04-24 ENCOUNTER — EVALUATION (OUTPATIENT)
Dept: PHYSICAL THERAPY | Facility: REHABILITATION | Age: 79
End: 2024-04-24
Payer: COMMERCIAL

## 2024-04-24 DIAGNOSIS — M25.552 PAIN IN LEFT HIP: ICD-10-CM

## 2024-04-24 DIAGNOSIS — G89.29 CHRONIC LEFT-SIDED LOW BACK PAIN WITHOUT SCIATICA: ICD-10-CM

## 2024-04-24 DIAGNOSIS — M54.50 CHRONIC LEFT-SIDED LOW BACK PAIN WITHOUT SCIATICA: ICD-10-CM

## 2024-04-24 PROCEDURE — 97110 THERAPEUTIC EXERCISES: CPT | Performed by: PHYSICAL THERAPIST

## 2024-04-24 PROCEDURE — 97162 PT EVAL MOD COMPLEX 30 MIN: CPT | Performed by: PHYSICAL THERAPIST

## 2024-04-24 NOTE — PROGRESS NOTES
PT Evaluation     Today's date: 2024  Patient name: Rachelle Narayanan  : 1945  MRN: [id number]  Referring provider: Sydnee Horton DO  Dx:   Encounter Diagnosis     ICD-10-CM    1. Pain in left hip  M25.552 Ambulatory referral to Physical Therapy      2. Chronic left-sided low back pain without sciatica  M54.50 Ambulatory referral to Physical Therapy    G89.29                      Assessment  Assessment details: Patient presents to PT with low back and L hip pain. Patient displays decreased lumbar ROM, hip ROM, hip strength and hip flexor hypomobility. These impairments are leading to pain with walking and standing activities. Patient will benefit from skilled PT to address above impairments and help them return to PLOF.    Impairments: abnormal coordination, abnormal gait, abnormal muscle firing, abnormal or restricted ROM, abnormal movement, activity intolerance, impaired balance, impaired physical strength, lacks appropriate home exercise program, pain with function and weight-bearing intolerance  Barriers to therapy: Potential rheumatoid involvement  Understanding of Dx/Px/POC: good   Prognosis: good    Goals  STG  1. Patient will display decreased pain to 0-2 in 6 weeks  2. Patient will display lumbar ROM WNL in 6 weeks  3. Patient will display core strength WNL in 6 weeks    LTG  1. Patient will be able to stand for 30 minutes without pain in 12 weeks  2. Patient will be able to walk for 30 minutes without pain in 12 weeks  3. Patient will be pick an object up off the floor without pain in 12 weeks          Plan  Planned modality interventions: traction, TENS, biofeedback, cryotherapy and thermotherapy: hydrocollator packs  Planned therapy interventions: abdominal trunk stabilization, activity modification, ADL training, balance, balance/weight bearing training, body mechanics training, joint mobilization, manual therapy, massage, motor coordination training, neuromuscular re-education, patient  education, postural training, self care, strengthening, stretching, therapeutic activities, therapeutic exercise, transfer training, home exercise program, graded activity, graded exercise, functional ROM exercises and flexibility  Frequency: 2x week  Duration in visits: 12  Duration in weeks: 6  Plan of Care beginning date: 2024  Plan of Care expiration date: 2024        Subjective Evaluation    History of Present Illness  Mechanism of injury: Patient states she has been getting low back and L hip pain. Patient has more pain when she is on her feet. When she sits she feels pretty good. She sometimes gets groin pain and pain when she lifts her leg. Patient has pain rolling in bed. Patient denies symptoms into legs. Patient also has pains in other joints so she is going to see if she can get some blood work done when she goes to her dr to look into rheumatoid conditions          Not a recurrent problem   Quality of life: fair    Patient Goals  Patient goals for therapy: decreased pain, increased motion and increased strength    Pain  Current pain ratin  At best pain ratin  At worst pain ratin  Quality: sharp and tight  Relieving factors: medications  Aggravating factors: stair climbing, walking and standing    Social Support  Steps to enter house: no    Employment status: not working        Objective     Active Range of Motion     Lumbar   Flexion: 40 degrees  Restriction level: minimal  Extension: 20 degrees  with pain Restriction level: moderate  Left lateral flexion: 10 degrees    with pain Restriction level: moderate  Right lateral flexion:  WFL  Left rotation: 20 degrees  with pain Restriction level: minimal  Right rotation:  WFL    Passive Range of Motion   Left Hip   Normal passive range of motion    Strength/Myotome Testing     Left Hip   Planes of Motion   Extension: 3+  Abduction: 3+  External rotation: 4    Tests     Lumbar     Left   Negative passive SLR and slump test.     General  Comments:      Hip Comments   Decreased hip flexor length on L  Graphical documentation:                  Precautions: open heart surgery last year      Manuals 4/24            STM L lumbar paraspinals                                                    Neuro Re-Ed             clamshells             sidesteps             bridges                                                                 Ther Ex             PB rollouts             PPT 2x10            ltr 2x10            Hip flexor s                                                                 Ther Activity             Step ups                          Gait Training                                       Modalities

## 2024-04-26 ENCOUNTER — HOSPITAL ENCOUNTER (OUTPATIENT)
Dept: RADIOLOGY | Facility: HOSPITAL | Age: 79
Discharge: HOME | End: 2024-04-26
Payer: COMMERCIAL

## 2024-04-26 ENCOUNTER — OFFICE VISIT (OUTPATIENT)
Dept: FAMILY MEDICINE CLINIC | Facility: CLINIC | Age: 79
End: 2024-04-26
Payer: COMMERCIAL

## 2024-04-26 VITALS
TEMPERATURE: 98.9 F | BODY MASS INDEX: 32.58 KG/M2 | OXYGEN SATURATION: 97 % | DIASTOLIC BLOOD PRESSURE: 84 MMHG | SYSTOLIC BLOOD PRESSURE: 126 MMHG | WEIGHT: 151 LBS | HEART RATE: 74 BPM | HEIGHT: 57 IN | RESPIRATION RATE: 14 BRPM

## 2024-04-26 DIAGNOSIS — I34.1 MITRAL VALVE PROLAPSE: ICD-10-CM

## 2024-04-26 DIAGNOSIS — M54.16 LUMBAR RADICULOPATHY: ICD-10-CM

## 2024-04-26 DIAGNOSIS — Z98.890 S/P MVR (MITRAL VALVE REPAIR): ICD-10-CM

## 2024-04-26 DIAGNOSIS — L89.899 PRESSURE INJURY OF SKIN OF DORSUM OF LEFT FOOT, UNSPECIFIED INJURY STAGE: ICD-10-CM

## 2024-04-26 DIAGNOSIS — R41.3 MEMORY LOSS: ICD-10-CM

## 2024-04-26 DIAGNOSIS — E04.1 THYROID NODULE: Primary | ICD-10-CM

## 2024-04-26 DIAGNOSIS — M81.8 IDIOPATHIC OSTEOPOROSIS: ICD-10-CM

## 2024-04-26 DIAGNOSIS — E78.5 DYSLIPIDEMIA: ICD-10-CM

## 2024-04-26 PROCEDURE — G2211 COMPLEX E/M VISIT ADD ON: HCPCS | Performed by: INTERNAL MEDICINE

## 2024-04-26 PROCEDURE — 73630 X-RAY EXAM OF FOOT: CPT

## 2024-04-26 PROCEDURE — 99214 OFFICE O/P EST MOD 30 MIN: CPT | Performed by: INTERNAL MEDICINE

## 2024-04-26 RX ORDER — CAL/D3/MAG11/ZINC/COP/MANG/BOR 600 MG-800
1 TABLET ORAL 2 TIMES DAILY WITH MEALS
Qty: 200 TABLET | Refills: 6 | Status: SHIPPED | OUTPATIENT
Start: 2024-04-26

## 2024-04-26 RX ORDER — TORSEMIDE 10 MG/1
10 TABLET ORAL DAILY
Qty: 90 TABLET | Refills: 3 | Status: SHIPPED | OUTPATIENT
Start: 2024-04-26

## 2024-04-26 RX ORDER — METOPROLOL SUCCINATE 50 MG/1
50 TABLET, EXTENDED RELEASE ORAL DAILY
Qty: 90 TABLET | Refills: 3 | Status: SHIPPED | OUTPATIENT
Start: 2024-04-26

## 2024-04-26 RX ORDER — ATORVASTATIN CALCIUM 20 MG/1
20 TABLET, FILM COATED ORAL DAILY
Qty: 100 TABLET | Refills: 3 | Status: SHIPPED | OUTPATIENT
Start: 2024-04-26

## 2024-04-26 NOTE — PATIENT INSTRUCTIONS

## 2024-04-26 NOTE — PROGRESS NOTES
Name: Rachelle Narayanan      : 1945      MRN: [id number]  Encounter Provider: Evangelist Shankar MD  Encounter Date: 2024   Encounter department: [facility]    Assessment & Plan     1. Thyroid nodule  Comments:  Repeat; US Thyroid  consider Bx  Orders:  -     TSH, 3rd generation; Future; Expected date: 2024  -     T4, free; Future; Expected date: 2024  -     US thyroid; Future; Expected date: 2024  -     Thyroid Antibodies Panel; Future  -     PTH, Intact and Calcium; Future  -     UA (URINE) with reflex to Scope; Future  -     Magnesium; Future  -     Lipid panel; Future; Expected date: 2024  -     CBC and differential; Future; Expected date: 2024  -     Comprehensive metabolic panel; Future; Expected date: 10/26/2024    2. Dyslipidemia  -     atorvastatin (LIPITOR) 20 mg tablet; Take 1 tablet (20 mg total) by mouth daily  -     UA (URINE) with reflex to Scope; Future  -     Magnesium; Future  -     Lipid panel; Future; Expected date: 2024  -     CBC and differential; Future; Expected date: 2024  -     Comprehensive metabolic panel; Future; Expected date: 10/26/2024    3. Idiopathic osteoporosis  Comments:  Caltrate Plus D  daily, and Prolia Inj Q 6 mos  Orders:  -     Calcium Carbonate-Vit D-Min (Caltrate 600+D Plus Minerals) 600-800 MG-UNIT TABS; Take 1 tablet by mouth 2 (two) times a day with meals    4. S/P MVR (mitral valve repair)  Comments:  continue same  FU w cardiology  RTC in 3 mos w Blood work  Orders:  -     metoprolol succinate (TOPROL-XL) 50 mg 24 hr tablet; Take 1 tablet (50 mg total) by mouth daily In the evening daily  -     torsemide (DEMADEX) 10 mg tablet; Take 1 tablet (10 mg total) by mouth daily  -     UA (URINE) with reflex to Scope; Future  -     Magnesium; Future  -     Lipid panel; Future; Expected date: 2024  -     CBC and differential; Future; Expected date: 2024  -     Comprehensive  metabolic panel; Future; Expected date: 10/26/2024    5. Mitral valve prolapse  -     torsemide (DEMADEX) 10 mg tablet; Take 1 tablet (10 mg total) by mouth daily    6. Lumbar radiculopathy  -     Ambulatory referral to Spine & Pain Management; Future    7. Pressure injury of skin of dorsum of left foot, unspecified injury stage  -     XR foot 3+ vw left; Future; Expected date: 04/26/2024    RTC in 3 mos w Blood work       Subjective      79 Y O lady is here for Regular check Up, she has left foot pain, recent Blood work and med list reviewed...      Review of Systems   Constitutional:  Negative for chills, fatigue and fever.   HENT:  Positive for postnasal drip. Negative for congestion, facial swelling, sore throat, trouble swallowing and voice change.    Eyes:  Negative for pain, discharge and visual disturbance.   Respiratory:  Negative for cough, shortness of breath and wheezing.    Cardiovascular:  Negative for chest pain, palpitations and leg swelling.   Gastrointestinal:  Negative for abdominal pain, blood in stool, constipation, diarrhea and nausea.   Endocrine: Negative for polydipsia, polyphagia and polyuria.   Genitourinary:  Negative for difficulty urinating, hematuria and urgency.   Musculoskeletal:  Positive for arthralgias and gait problem. Negative for myalgias.   Skin:  Negative for rash.   Neurological:  Negative for dizziness, tremors, weakness and headaches.   Hematological:  Negative for adenopathy. Does not bruise/bleed easily.   Psychiatric/Behavioral:  Negative for dysphoric mood, sleep disturbance and suicidal ideas.        Current Outpatient Medications on File Prior to Visit   Medication Sig    aspirin (ECOTRIN LOW STRENGTH) 81 mg EC tablet Take 1 tablet (81 mg total) by mouth daily    chlorhexidine (PERIDEX) 0.12 % solution Apply 15 mL to the mouth or throat 2 (two) times a day    Coenzyme Q10 10 MG capsule Take 1 capsule (10 mg total) by mouth daily    denosumab (PROLIA) 60 mg/mL Inject  "1 mL (60 mg total) under the skin once for 1 dose    Diclofenac Sodium (VOLTAREN) 1 % Apply 2 g topically 4 (four) times a day    losartan (COZAAR) 50 mg tablet Take 1 tablet (50 mg total) by mouth daily In the Morning..    vitamin B-12 (VITAMIN B-12) 1,000 mcg tablet Take 1 tablet (1,000 mcg total) by mouth daily    [DISCONTINUED] amoxicillin (AMOXIL) 500 mg capsule Take 500 mg by mouth every 8 (eight) hours    [DISCONTINUED] atorvastatin (LIPITOR) 20 mg tablet Take 1 tablet (20 mg total) by mouth daily    [DISCONTINUED] Calcium Carbonate-Vit D-Min (Caltrate 600+D Plus Minerals) 600-800 MG-UNIT TABS Take 1 tablet by mouth 2 (two) times a day with meals    [DISCONTINUED] metoprolol succinate (TOPROL-XL) 50 mg 24 hr tablet Take 1 tablet (50 mg total) by mouth daily In the evening daily    [DISCONTINUED] torsemide (DEMADEX) 10 mg tablet Take 1 tablet (10 mg total) by mouth daily       Objective     /84 (BP Location: Left arm, Patient Position: Sitting, Cuff Size: Standard)   Pulse 74   Temp 98.9 °F (37.2 °C) (Tympanic)   Resp 14   Ht 4' 9\" (1.448 m)   Wt 68.5 kg (151 lb)   SpO2 97%   BMI 32.68 kg/m²     Physical Exam  Constitutional:       General: She is not in acute distress.  HENT:      Head: Normocephalic.      Mouth/Throat:      Pharynx: No oropharyngeal exudate.   Eyes:      General: No scleral icterus.     Conjunctiva/sclera: Conjunctivae normal.      Pupils: Pupils are equal, round, and reactive to light.   Neck:      Thyroid: No thyromegaly.   Cardiovascular:      Rate and Rhythm: Normal rate and regular rhythm.      Heart sounds: Murmur heard.   Pulmonary:      Effort: Pulmonary effort is normal. No respiratory distress.      Breath sounds: Normal breath sounds. No wheezing or rales.   Abdominal:      General: Bowel sounds are normal. There is no distension.      Palpations: Abdomen is soft.      Tenderness: There is no abdominal tenderness. There is no guarding or rebound.   Musculoskeletal:    "      General: Tenderness present.      Cervical back: Neck supple.   Lymphadenopathy:      Cervical: No cervical adenopathy.   Skin:     Coloration: Skin is not pale.      Findings: No rash.   Neurological:      Mental Status: She is alert and oriented to person, place, and time.      Sensory: No sensory deficit.      Motor: No weakness.       Evangelist Shankar MD

## 2024-04-29 ENCOUNTER — OFFICE VISIT (OUTPATIENT)
Dept: PHYSICAL THERAPY | Facility: REHABILITATION | Age: 79
End: 2024-04-29
Payer: COMMERCIAL

## 2024-04-29 DIAGNOSIS — M54.50 CHRONIC LEFT-SIDED LOW BACK PAIN WITHOUT SCIATICA: ICD-10-CM

## 2024-04-29 DIAGNOSIS — M25.552 PAIN IN LEFT HIP: Primary | ICD-10-CM

## 2024-04-29 DIAGNOSIS — G89.29 CHRONIC LEFT-SIDED LOW BACK PAIN WITHOUT SCIATICA: ICD-10-CM

## 2024-04-29 PROCEDURE — 97112 NEUROMUSCULAR REEDUCATION: CPT | Performed by: PHYSICAL THERAPIST

## 2024-04-29 PROCEDURE — 97140 MANUAL THERAPY 1/> REGIONS: CPT | Performed by: PHYSICAL THERAPIST

## 2024-04-29 PROCEDURE — 97110 THERAPEUTIC EXERCISES: CPT | Performed by: PHYSICAL THERAPIST

## 2024-04-29 NOTE — PROGRESS NOTES
"Daily Note     Today's date: 2024  Patient name: Rachelle Narayanan  : 1945  MRN: [id number]  Referring provider: Sydnee Horton DO  Dx:   Encounter Diagnosis     ICD-10-CM    1. Pain in left hip  M25.552       2. Chronic left-sided low back pain without sciatica  M54.50     G89.29               Subjective: patient reports feeling a little better than she was prior to the evaluation, she thinks the exercises are helping.      Objective: See treatment diary below      Assessment: Tolerated treatment well. Initiated program as noted below.Patient demonstrated fatigue post treatment, exhibited good technique with therapeutic exercises, and would benefit from continued PT      Plan: Continue per plan of care.  Progress treatment as tolerated.       Precautions: open heart surgery last year      Manuals            STM L lumbar paraspinals  8'                                                  Neuro Re-Ed             clamshells  5\" 2x10           sidesteps             bridges  5\" 2x10           Standing March with TB at feet                                                    Ther Ex             Bike  5' lvl 0           PB rollouts  10\" x10 fwd x5 L/R           PPT 2x10 2x10           ltr 2x10 3x10           Hip flexor s  nv                                                               Ther Activity             Step ups  4\" x10 ea                        Gait Training                                       Modalities                                            "

## 2024-04-30 ENCOUNTER — TELEPHONE (OUTPATIENT)
Dept: FAMILY MEDICINE CLINIC | Facility: CLINIC | Age: 79
End: 2024-04-30

## 2024-04-30 NOTE — TELEPHONE ENCOUNTER
Spoke with the patient.  She sees ortho for her foot.  She will call to have them review x-ray results.

## 2024-04-30 NOTE — TELEPHONE ENCOUNTER
----- Message from Evangelist Shankar MD sent at 4/27/2024  2:32 PM EDT -----  Orthopedic consult  ----- Message -----  From: Interface, Radiology Results In  Sent: 4/26/2024  10:20 PM EDT  To: Evangelist Shankar MD

## 2024-05-02 ENCOUNTER — OFFICE VISIT (OUTPATIENT)
Dept: PHYSICAL THERAPY | Facility: REHABILITATION | Age: 79
End: 2024-05-02
Payer: COMMERCIAL

## 2024-05-02 DIAGNOSIS — G89.29 CHRONIC LEFT-SIDED LOW BACK PAIN WITHOUT SCIATICA: ICD-10-CM

## 2024-05-02 DIAGNOSIS — M25.552 PAIN IN LEFT HIP: Primary | ICD-10-CM

## 2024-05-02 DIAGNOSIS — M54.50 CHRONIC LEFT-SIDED LOW BACK PAIN WITHOUT SCIATICA: ICD-10-CM

## 2024-05-02 PROCEDURE — 97530 THERAPEUTIC ACTIVITIES: CPT | Performed by: PHYSICAL THERAPIST

## 2024-05-02 PROCEDURE — 97112 NEUROMUSCULAR REEDUCATION: CPT | Performed by: PHYSICAL THERAPIST

## 2024-05-02 PROCEDURE — 97110 THERAPEUTIC EXERCISES: CPT | Performed by: PHYSICAL THERAPIST

## 2024-05-02 NOTE — PROGRESS NOTES
"Daily Note     Today's date: 2024  Patient name: Rachelle Narayanan  : 1945  MRN: [id number]  Referring provider: Sydnee Horton DO  Dx:   Encounter Diagnosis     ICD-10-CM    1. Pain in left hip  M25.552       2. Chronic left-sided low back pain without sciatica  M54.50     G89.29                      Subjective: patient states she feels like the back is loosening up and she is able to stand longer      Objective: See treatment diary below      Assessment: Tolerated treatment well. Patient demonstrated fatigue post treatment, exhibited good technique with therapeutic exercises, and would benefit from continued PT Patient with improving tightness in lumbar paraspinals. Patient tolerated more stretching and strengthening. Updated HEP      Plan: Continue per plan of care.      Precautions: open heart surgery last year      Manuals           STM L lumbar paraspinals  8'                                                  Neuro Re-Ed             clamshells  5\" 2x10 Pink 2x10          sidesteps             bridges  5\" 2x10 2x10          Standing March with TB at feet                                                    Ther Ex             Bike  5' lvl 0           PB rollouts  10\" x10 fwd x5 L/R 5\" x10 ea          PPT 2x10 2x10 2x10          ltr 2x10 3x10 5\"x10          Hip flexor s  nv 15sx3                                                              Ther Activity             Step ups  4\" x10 ea 6\" x10 ea                       Gait Training                                       Modalities                                              "

## 2024-05-06 ENCOUNTER — OFFICE VISIT (OUTPATIENT)
Dept: PHYSICAL THERAPY | Facility: REHABILITATION | Age: 79
End: 2024-05-06
Payer: COMMERCIAL

## 2024-05-06 DIAGNOSIS — M25.552 PAIN IN LEFT HIP: Primary | ICD-10-CM

## 2024-05-06 DIAGNOSIS — G89.29 CHRONIC LEFT-SIDED LOW BACK PAIN WITHOUT SCIATICA: ICD-10-CM

## 2024-05-06 DIAGNOSIS — M54.50 CHRONIC LEFT-SIDED LOW BACK PAIN WITHOUT SCIATICA: ICD-10-CM

## 2024-05-06 PROCEDURE — 97530 THERAPEUTIC ACTIVITIES: CPT | Performed by: PHYSICAL THERAPIST

## 2024-05-06 PROCEDURE — 97110 THERAPEUTIC EXERCISES: CPT | Performed by: PHYSICAL THERAPIST

## 2024-05-06 PROCEDURE — 97112 NEUROMUSCULAR REEDUCATION: CPT | Performed by: PHYSICAL THERAPIST

## 2024-05-06 NOTE — PROGRESS NOTES
"Daily Note     Today's date: 2024  Patient name: Rachelle Narayanan  : 1945  MRN: [id number]  Referring provider: Sydnee Horton DO  Dx:   Encounter Diagnosis     ICD-10-CM    1. Pain in left hip  M25.552       2. Chronic left-sided low back pain without sciatica  M54.50     G89.29                      Subjective: patient states her back is doing better but she is still limited with walking tolerance      Objective: See treatment diary below      Assessment: Tolerated treatment well. Patient demonstrated fatigue post treatment, exhibited good technique with therapeutic exercises, and would benefit from continued PT Patient with improving symptoms. She is having less tightness in her back and is able to stand for longer periods      Plan: Continue per plan of care.      Precautions: open heart surgery last year      Manuals          STM L lumbar paraspinals  8'                                                  Neuro Re-Ed             clamshells  5\" 2x10 Pink 2x10 Pink 3x10         sidesteps             bridges  5\" 2x10 2x10 3x10         Standing March with TB at feet                                                    Ther Ex             Bike  5' lvl 0           PB rollouts  10\" x10 fwd x5 L/R 5\" x10 ea 5\"x10 ea         PPT 2x10 2x10 2x10 3x10         ltr 2x10 3x10 5\"x10 5\"x10         Hip flexor s  nv 15sx3 15sx3 B         Hip abd    2x10 B                                                Ther Activity             Step ups  4\" x10 ea 6\" x10 ea 6\" 2x10                       Gait Training                                       Modalities                                                "

## 2024-05-07 ENCOUNTER — TELEPHONE (OUTPATIENT)
Dept: FAMILY MEDICINE CLINIC | Facility: CLINIC | Age: 79
End: 2024-05-07

## 2024-05-07 DIAGNOSIS — K04.7 ABSCESSED TOOTH: Primary | ICD-10-CM

## 2024-05-07 RX ORDER — AMOXICILLIN 500 MG/1
500 CAPSULE ORAL EVERY 8 HOURS SCHEDULED
Qty: 30 CAPSULE | Refills: 0 | Status: SHIPPED | OUTPATIENT
Start: 2024-05-07 | End: 2024-05-17

## 2024-05-07 NOTE — TELEPHONE ENCOUNTER
Regarding: FW: Amoxil 500 mg.  Contact: [phone]  ok  ----- Message -----  From: Sisi Bennett  Sent: 5/7/2024   8:58 AM EDT  To: Evangelist Shankar MD  Subject: FW: Amoxil 500 mg.                                 ----- Message -----  From: Asa Ortega MA  Sent: 5/7/2024   8:54 AM EDT  To: [facility]  Subject: Amoxil 500 mg.                                   ----- Message from Asa Ortega MA sent at 5/7/2024  8:54 AM EDT -----       ----- Message from Rachelle Narayanan to Evangelist Shankar MD sent at 5/6/2024  7:06 PM -----   Please refill my Rx Amoxil 500mg.  Dave and I are having dental procedures that are requiring the pre-medication.  Thank you.  Dave is also in need of a refill.  Rachelle Narayanan

## 2024-05-07 NOTE — TELEPHONE ENCOUNTER
Please see pts message from [employer] from 5/2    Please refill my Rx Amoxil 500mg.  Dave and I are having dental procedures that are requiring the pre-medication.  Thank you.  Dave is also in need of a refill.  Rachelle Narayanan

## 2024-05-09 ENCOUNTER — APPOINTMENT (OUTPATIENT)
Dept: LAB | Facility: AMBULARY SURGERY CENTER | Age: 79
End: 2024-05-09
Payer: COMMERCIAL

## 2024-05-09 DIAGNOSIS — E04.1 THYROID NODULE: ICD-10-CM

## 2024-05-09 DIAGNOSIS — Z98.890 S/P MVR (MITRAL VALVE REPAIR): ICD-10-CM

## 2024-05-09 DIAGNOSIS — R79.89 ELEVATED PARATHYROID HORMONE: Primary | ICD-10-CM

## 2024-05-09 DIAGNOSIS — E78.5 DYSLIPIDEMIA: ICD-10-CM

## 2024-05-09 LAB
BACTERIA UR QL AUTO: ABNORMAL /HPF
BILIRUB UR QL STRIP: NEGATIVE
CALCIUM SERPL-MCNC: 10.2 MG/DL (ref 8.4–10.2)
CLARITY UR: CLEAR
COLOR UR: ABNORMAL
GLUCOSE UR STRIP-MCNC: NEGATIVE MG/DL
HGB UR QL STRIP.AUTO: NEGATIVE
KETONES UR STRIP-MCNC: NEGATIVE MG/DL
LEUKOCYTE ESTERASE UR QL STRIP: ABNORMAL
MAGNESIUM SERPL-MCNC: 1.9 MG/DL (ref 1.9–2.7)
NITRITE UR QL STRIP: NEGATIVE
NON-SQ EPI CELLS URNS QL MICRO: ABNORMAL /HPF
PH UR STRIP.AUTO: 6 [PH]
PROT UR STRIP-MCNC: ABNORMAL MG/DL
PTH-INTACT SERPL-MCNC: 139.1 PG/ML (ref 12–88)
RBC #/AREA URNS AUTO: ABNORMAL /HPF
SP GR UR STRIP.AUTO: 1.02 (ref 1–1.03)
T4 FREE SERPL-MCNC: 0.91 NG/DL (ref 0.61–1.12)
TSH SERPL DL<=0.05 MIU/L-ACNC: 0.49 UIU/ML (ref 0.45–4.5)
UROBILINOGEN UR STRIP-ACNC: <2 MG/DL
WBC #/AREA URNS AUTO: ABNORMAL /HPF

## 2024-05-09 PROCEDURE — 83735 ASSAY OF MAGNESIUM: CPT

## 2024-05-09 PROCEDURE — 36415 COLL VENOUS BLD VENIPUNCTURE: CPT

## 2024-05-09 PROCEDURE — 84439 ASSAY OF FREE THYROXINE: CPT

## 2024-05-09 PROCEDURE — 84443 ASSAY THYROID STIM HORMONE: CPT

## 2024-05-09 PROCEDURE — 83970 ASSAY OF PARATHORMONE: CPT

## 2024-05-09 PROCEDURE — 86800 THYROGLOBULIN ANTIBODY: CPT

## 2024-05-09 PROCEDURE — 81001 URINALYSIS AUTO W/SCOPE: CPT

## 2024-05-09 PROCEDURE — 86376 MICROSOMAL ANTIBODY EACH: CPT

## 2024-05-10 ENCOUNTER — OFFICE VISIT (OUTPATIENT)
Dept: PHYSICAL THERAPY | Facility: REHABILITATION | Age: 79
End: 2024-05-10
Payer: COMMERCIAL

## 2024-05-10 ENCOUNTER — TELEPHONE (OUTPATIENT)
Dept: FAMILY MEDICINE CLINIC | Facility: CLINIC | Age: 79
End: 2024-05-10

## 2024-05-10 DIAGNOSIS — M54.50 CHRONIC LEFT-SIDED LOW BACK PAIN WITHOUT SCIATICA: ICD-10-CM

## 2024-05-10 DIAGNOSIS — M25.552 PAIN IN LEFT HIP: Primary | ICD-10-CM

## 2024-05-10 DIAGNOSIS — G89.29 CHRONIC LEFT-SIDED LOW BACK PAIN WITHOUT SCIATICA: ICD-10-CM

## 2024-05-10 LAB
THYROGLOB AB SERPL-ACNC: <1 IU/ML (ref 0–0.9)
THYROPEROXIDASE AB SERPL-ACNC: <9 IU/ML (ref 0–34)

## 2024-05-10 PROCEDURE — 97110 THERAPEUTIC EXERCISES: CPT | Performed by: PHYSICAL THERAPIST

## 2024-05-10 PROCEDURE — 97530 THERAPEUTIC ACTIVITIES: CPT | Performed by: PHYSICAL THERAPIST

## 2024-05-10 PROCEDURE — 97112 NEUROMUSCULAR REEDUCATION: CPT | Performed by: PHYSICAL THERAPIST

## 2024-05-10 NOTE — TELEPHONE ENCOUNTER
----- Message from Evangelist Shankar MD sent at 5/9/2024  7:38 PM EDT -----  Parathyroid scan. And; clean catch UA  ----- Message -----  From: Lab, Background User  Sent: 5/9/2024   3:12 PM EDT  To: Evangelist Shankar MD

## 2024-05-10 NOTE — TELEPHONE ENCOUNTER
Spoke with the patient.  Informed her of results.  She has Parathyroid scan scheduled 5/23/24 and will stop into the office next week to give UA sample.

## 2024-05-10 NOTE — PROGRESS NOTES
"Daily Note     Today's date: 5/10/2024  Patient name: Rachelle Narayanan  : 1945  MRN: [id number]  Referring provider: Sydnee Horton DO  Dx:   Encounter Diagnosis     ICD-10-CM    1. Pain in left hip  M25.552       2. Chronic left-sided low back pain without sciatica  M54.50     G89.29                      Subjective: patient states she is having trouble getting in and out of her car      Objective: See treatment diary below      Assessment: Tolerated treatment well. Patient demonstrated fatigue post treatment, exhibited good technique with therapeutic exercises, and would benefit from continued PT Patient with increased discomfort today. Patient having a little more hip tightness today      Plan: Continue per plan of care.      Precautions: open heart surgery last year      Manuals 4/24 4/29 5/2 5/6 5/10        STM L lumbar paraspinals  8'                                                  Neuro Re-Ed             clamshells  5\" 2x10 Pink 2x10 Pink 3x10 Grn 3x10        sidesteps             bridges  5\" 2x10 2x10 3x10 3x10        Standing March with TB at feet                                                    Ther Ex             Bike  5' lvl 0   5 min        PB rollouts  10\" x10 fwd x5 L/R 5\" x10 ea 5\"x10 ea 5\"x10        PPT 2x10 2x10 2x10 3x10 3x10        ltr 2x10 3x10 5\"x10 5\"x10 5\"x10        Hip flexor s  nv 15sx3 15sx3 B 15sx3 ea        Hip abd/ext    2x10 B 2x10 B        Piriformis s     3x15s                                  Ther Activity             Step ups  4\" x10 ea 6\" x10 ea 6\" 2x10  6\" 2x10                     Gait Training                                       Modalities                                                  "

## 2024-05-13 ENCOUNTER — OFFICE VISIT (OUTPATIENT)
Dept: OBGYN CLINIC | Facility: CLINIC | Age: 79
End: 2024-05-13
Payer: COMMERCIAL

## 2024-05-13 VITALS
WEIGHT: 151 LBS | HEART RATE: 89 BPM | HEIGHT: 57 IN | BODY MASS INDEX: 32.58 KG/M2 | SYSTOLIC BLOOD PRESSURE: 126 MMHG | DIASTOLIC BLOOD PRESSURE: 82 MMHG

## 2024-05-13 DIAGNOSIS — M54.50 CHRONIC LEFT-SIDED LOW BACK PAIN WITHOUT SCIATICA: ICD-10-CM

## 2024-05-13 DIAGNOSIS — M25.552 PAIN OF LEFT HIP: Primary | ICD-10-CM

## 2024-05-13 DIAGNOSIS — G89.29 CHRONIC LEFT-SIDED LOW BACK PAIN WITHOUT SCIATICA: ICD-10-CM

## 2024-05-13 PROCEDURE — 99213 OFFICE O/P EST LOW 20 MIN: CPT | Performed by: PHYSICAL MEDICINE & REHABILITATION

## 2024-05-13 NOTE — PROGRESS NOTES
1. Pain of left hip        2. Chronic left-sided low back pain without sciatica          No orders of the defined types were placed in this encounter.       Impression:  Patient is here in follow up of left hip and lumbar pain likely multifactorial and secondary to lumbar degenerative disc disease/facet hypertrophy, greater trochanteric pain syndrome and mild left hip osteoarthritis.  Treatment has included physical therapy and home exercise program.  Her left hip and low back symptoms are doing well.  No intra-articular hip signs on exam today.  She has better internal range of motion of her hip.  She is less tender along the greater trochanteric region.  She would benefit from PT and then transition to home exercise program.  I will see her back if needed.    Imaging Studies (I personally reviewed images in PACS and report):  Left hip x-rays most recent to this encounter reviewed.  These images show minimal left hip osteoarthritis.  There is a radiodensity seen in the pelvis on frontal view.     Lumbar spine x-rays show curvature in the spine along with multilevel degenerative disc disease and significant facet hypertrophy.  There is also a radiodensity in the pelvis seen on the frontal view.    No follow-ups on file.    Patient is in agreement with the above plan.    HPI:  Rachelle Narayanan is a 79 y.o. female  who presents in follow up.  Here for No chief complaint on file.      Since last visit: See above.    Following history reviewed and updated:  Past Medical History:   Diagnosis Date    Arthritis     Cellulitis of face 10/16/2019    Bilateral nares    Family history of colon cancer in mother     Functional murmur     Hypercalcemia 11/21/2016    Hypertension     Dx'd in 2003, controlled     Osteoporosis     Screening for breast cancer 12/5/2018    SOB (shortness of breath) 7/31/2020    Viral upper respiratory tract infection 10/30/2018     Past Surgical History:   Procedure Laterality Date    CARDIAC  SURGERY       SECTION      x2,  &     FL GUIDED NEEDLE PLAC BX/ASP/INJ  2024    FLEXIBLE SIGMOIDOSCOPY      MITRAL VALVE REPAIR      MO COLONOSCOPY FLX DX W/COLLJ SPEC WHEN PFRMD N/A 10/10/2016    Procedure: COLONOSCOPY;  Surgeon: Patsy Escobedo MD;  Location: [facility];  Service: Gastroenterology    TOE SURGERY Bilateral     hammer toe correction surgery    TONSILLECTOMY      with Adenoidectomy     Social History   Social History     Substance and Sexual Activity   Alcohol Use Not Currently    Comment: Rarely      Social History     Substance and Sexual Activity   Drug Use No     Social History     Tobacco Use   Smoking Status Never    Passive exposure: Past   Smokeless Tobacco Never     Family History   Problem Relation Age of Onset    Colon cancer Mother 73    Aneurysm Father     No Known Problems Sister     No Known Problems Paternal Aunt     No Known Problems Paternal Aunt     No Known Problems Paternal Aunt     No Known Problems Paternal Aunt     No Known Problems Maternal Grandmother     No Known Problems Maternal Grandfather     No Known Problems Paternal Grandmother     No Known Problems Paternal Grandfather     No Known Problems Daughter     No Known Problems Son     No Known Problems Son      Allergies   Allergen Reactions    Poison Ivy Extract Rash        Constitutional:  There were no vitals taken for this visit.   General: NAD.  Eyes: Clear sclerae.  ENT: No inflammation, lesion, or mass of lips.  No tracheal deviation.  Musculoskeletal: As mentioned below.  Integumentary: No visible rashes or skin lesions.  Pulmonary/Chest: Effort normal. No respiratory distress.   Neuro: CN's grossly intact, DUMONT.  Psych: Normal affect and judgement.  Vascular: WWP.    Left Hip Exam     Range of Motion   Internal rotation: normal     Other   Erythema: absent  Scars: absent  Sensation: normal  Pulse: present             Procedures

## 2024-05-14 ENCOUNTER — CLINICAL SUPPORT (OUTPATIENT)
Dept: FAMILY MEDICINE CLINIC | Facility: CLINIC | Age: 79
End: 2024-05-14
Payer: COMMERCIAL

## 2024-05-14 DIAGNOSIS — R80.8 OTHER PROTEINURIA: Primary | ICD-10-CM

## 2024-05-14 LAB
SL AMB  POCT GLUCOSE, UA: NORMAL
SL AMB LEUKOCYTE ESTERASE,UA: NORMAL
SL AMB POCT BILIRUBIN,UA: NORMAL
SL AMB POCT BLOOD,UA: NORMAL
SL AMB POCT CLARITY,UA: CLEAR
SL AMB POCT COLOR,UA: YELLOW
SL AMB POCT KETONES,UA: NORMAL
SL AMB POCT NITRITE,UA: NORMAL
SL AMB POCT PH,UA: 6
SL AMB POCT SPECIFIC GRAVITY,UA: 1.01
SL AMB POCT URINE PROTEIN: NORMAL
SL AMB POCT UROBILINOGEN: 0.2

## 2024-05-14 PROCEDURE — 81002 URINALYSIS NONAUTO W/O SCOPE: CPT | Performed by: INTERNAL MEDICINE

## 2024-05-15 ENCOUNTER — OFFICE VISIT (OUTPATIENT)
Dept: PHYSICAL THERAPY | Facility: REHABILITATION | Age: 79
End: 2024-05-15
Payer: COMMERCIAL

## 2024-05-15 DIAGNOSIS — G89.29 CHRONIC LEFT-SIDED LOW BACK PAIN WITHOUT SCIATICA: ICD-10-CM

## 2024-05-15 DIAGNOSIS — M54.50 CHRONIC LEFT-SIDED LOW BACK PAIN WITHOUT SCIATICA: ICD-10-CM

## 2024-05-15 DIAGNOSIS — M25.552 PAIN IN LEFT HIP: Primary | ICD-10-CM

## 2024-05-15 PROCEDURE — 97530 THERAPEUTIC ACTIVITIES: CPT | Performed by: PHYSICAL THERAPIST

## 2024-05-15 PROCEDURE — 97112 NEUROMUSCULAR REEDUCATION: CPT | Performed by: PHYSICAL THERAPIST

## 2024-05-15 PROCEDURE — 97110 THERAPEUTIC EXERCISES: CPT | Performed by: PHYSICAL THERAPIST

## 2024-05-15 NOTE — PROGRESS NOTES
"Daily Note     Today's date: 5/15/2024  Patient name: Rachelle Narayanan  : 1945  MRN: [id number]  Referring provider: Sydnee Horton DO  Dx:   Encounter Diagnosis     ICD-10-CM    1. Pain in left hip  M25.552       2. Chronic left-sided low back pain without sciatica  M54.50     G89.29                      Subjective: patient states she is getting better. She is going to try aquatic therapy as well and see if that can help a little too      Objective: See treatment diary below      Assessment: Tolerated treatment well. Patient demonstrated fatigue post treatment, exhibited good technique with therapeutic exercises, and would benefit from continued PT Patient with improving symptoms. Patient still has some hip ROM and weakness       Plan: Continue per plan of care.      Precautions: open heart surgery last year      Manuals 4/24 4/29 5/2 5/6 5/10 5/15       STM L lumbar paraspinals  8'                                                  Neuro Re-Ed             clamshells  5\" 2x10 Pink 2x10 Pink 3x10 Grn 3x10 Grn 3x10       sidesteps             bridges  5\" 2x10 2x10 3x10 3x10 3x10       Standing March with TB at feet                                                    Ther Ex             Bike  5' lvl 0   5 min 5 min       PB rollouts  10\" x10 fwd x5 L/R 5\" x10 ea 5\"x10 ea 5\"x10 5\"x10       PPT 2x10 2x10 2x10 3x10 3x10 3x10       ltr 2x10 3x10 5\"x10 5\"x10 5\"x10 5\"x10       Hip flexor s  nv 15sx3 15sx3 B 15sx3 ea 15sx3       Hip abd/ext    2x10 B 2x10 B 2x10 B       Piriformis s     3x15s 3x20s                                 Ther Activity             Step ups  4\" x10 ea 6\" x10 ea 6\" 2x10  6\" 2x10 6\"2x10                    Gait Training                                       Modalities                                                    "

## 2024-05-22 ENCOUNTER — APPOINTMENT (OUTPATIENT)
Dept: PHYSICAL THERAPY | Facility: REHABILITATION | Age: 79
End: 2024-05-22
Payer: COMMERCIAL

## 2024-05-23 ENCOUNTER — HOSPITAL ENCOUNTER (OUTPATIENT)
Dept: NUCLEAR MEDICINE | Facility: HOSPITAL | Age: 79
Discharge: HOME | End: 2024-05-23
Payer: COMMERCIAL

## 2024-05-23 DIAGNOSIS — R79.89 ELEVATED PARATHYROID HORMONE: ICD-10-CM

## 2024-05-23 PROCEDURE — 78072 PARATHYRD PLANAR W/SPECT&CT: CPT

## 2024-05-23 PROCEDURE — A9500 TC99M SESTAMIBI: HCPCS

## 2024-05-24 ENCOUNTER — APPOINTMENT (OUTPATIENT)
Dept: PHYSICAL THERAPY | Facility: REHABILITATION | Age: 79
End: 2024-05-24
Payer: COMMERCIAL

## 2024-05-24 DIAGNOSIS — R79.89 ELEVATED PARATHYROID HORMONE: Primary | ICD-10-CM

## 2024-07-25 ENCOUNTER — RA CDI HCC (OUTPATIENT)
Dept: OTHER | Facility: HOSPITAL | Age: 79
End: 2024-07-25

## 2024-07-25 NOTE — PROGRESS NOTES
N18.31, I13.0  HCC coding opportunities          Chart Reviewed number of suggestions sent to Provider: 2     Patients Insurance     Medicare Insurance: [employer]age

## 2024-07-26 ENCOUNTER — LAB (OUTPATIENT)
Dept: LAB | Facility: AMBULARY SURGERY CENTER | Age: 79
End: 2024-07-26
Payer: COMMERCIAL

## 2024-07-26 ENCOUNTER — TELEPHONE (OUTPATIENT)
Dept: ENDOCRINOLOGY | Facility: CLINIC | Age: 79
End: 2024-07-26

## 2024-07-26 DIAGNOSIS — E55.9 VITAMIN D INSUFFICIENCY: ICD-10-CM

## 2024-07-26 DIAGNOSIS — E83.52 HYPERCALCEMIA: ICD-10-CM

## 2024-07-26 DIAGNOSIS — R79.89 ELEVATED PARATHYROID HORMONE: ICD-10-CM

## 2024-07-26 DIAGNOSIS — E04.1 THYROID NODULE: ICD-10-CM

## 2024-07-26 LAB
25(OH)D3 SERPL-MCNC: 40.5 NG/ML (ref 30–100)
ANION GAP SERPL CALCULATED.3IONS-SCNC: 9 MMOL/L (ref 4–13)
BUN SERPL-MCNC: 25 MG/DL (ref 5–25)
CALCIUM SERPL-MCNC: 10.2 MG/DL (ref 8.4–10.2)
CHLORIDE SERPL-SCNC: 110 MMOL/L (ref 96–108)
CO2 SERPL-SCNC: 24 MMOL/L (ref 21–32)
CREAT SERPL-MCNC: 1.02 MG/DL (ref 0.6–1.3)
GFR SERPL CREATININE-BSD FRML MDRD: 53 ML/MIN/1.73SQ M
GLUCOSE P FAST SERPL-MCNC: 95 MG/DL (ref 65–99)
POTASSIUM SERPL-SCNC: 4 MMOL/L (ref 3.5–5.3)
PTH-INTACT SERPL-MCNC: 121.6 PG/ML (ref 12–88)
SODIUM SERPL-SCNC: 143 MMOL/L (ref 135–147)
T4 FREE SERPL-MCNC: 0.76 NG/DL (ref 0.61–1.12)
TSH SERPL DL<=0.05 MIU/L-ACNC: 0.89 UIU/ML (ref 0.45–4.5)

## 2024-07-26 PROCEDURE — 84443 ASSAY THYROID STIM HORMONE: CPT

## 2024-07-26 PROCEDURE — 82306 VITAMIN D 25 HYDROXY: CPT

## 2024-07-26 PROCEDURE — 84439 ASSAY OF FREE THYROXINE: CPT

## 2024-07-26 PROCEDURE — 80048 BASIC METABOLIC PNL TOTAL CA: CPT

## 2024-07-26 PROCEDURE — 36415 COLL VENOUS BLD VENIPUNCTURE: CPT

## 2024-07-26 PROCEDURE — 83970 ASSAY OF PARATHORMONE: CPT

## 2024-07-26 NOTE — TELEPHONE ENCOUNTER
Pt was seen last year,   She will need follow up appt with endo ( 1 yr follow up)     Jerad Goodwin

## 2024-07-26 NOTE — RESULT ENCOUNTER NOTE
Rachelle Valdes     Your blood work including thyroid blood work results as well as vitamin D is within normal range.  PTH is elevated  Please make follow-up appointment with endocrinology to discuss options for osteoporosis management

## 2024-08-01 ENCOUNTER — HOSPITAL ENCOUNTER (OUTPATIENT)
Dept: RADIOLOGY | Facility: HOSPITAL | Age: 79
Discharge: HOME | End: 2024-08-01
Payer: COMMERCIAL

## 2024-08-01 ENCOUNTER — TELEPHONE (OUTPATIENT)
Age: 79
End: 2024-08-01

## 2024-08-01 ENCOUNTER — OFFICE VISIT (OUTPATIENT)
Dept: FAMILY MEDICINE CLINIC | Facility: CLINIC | Age: 79
End: 2024-08-01
Payer: COMMERCIAL

## 2024-08-01 VITALS
SYSTOLIC BLOOD PRESSURE: 126 MMHG | BODY MASS INDEX: 32.68 KG/M2 | HEIGHT: 57 IN | RESPIRATION RATE: 14 BRPM | HEART RATE: 76 BPM | TEMPERATURE: 98 F | DIASTOLIC BLOOD PRESSURE: 82 MMHG | OXYGEN SATURATION: 99 %

## 2024-08-01 DIAGNOSIS — E78.5 DYSLIPIDEMIA: ICD-10-CM

## 2024-08-01 DIAGNOSIS — I10 BENIGN ESSENTIAL HYPERTENSION: ICD-10-CM

## 2024-08-01 DIAGNOSIS — M79.672 PAIN IN BOTH FEET: ICD-10-CM

## 2024-08-01 DIAGNOSIS — Z00.01 ENCOUNTER FOR GENERAL ADULT MEDICAL EXAMINATION WITH ABNORMAL FINDINGS: Primary | ICD-10-CM

## 2024-08-01 DIAGNOSIS — I34.1 MITRAL VALVE PROLAPSE: ICD-10-CM

## 2024-08-01 DIAGNOSIS — M79.671 PAIN IN BOTH FEET: ICD-10-CM

## 2024-08-01 DIAGNOSIS — M81.8 IDIOPATHIC OSTEOPOROSIS: ICD-10-CM

## 2024-08-01 DIAGNOSIS — Z98.890 S/P MVR (MITRAL VALVE REPAIR): ICD-10-CM

## 2024-08-01 DIAGNOSIS — D35.1 PARATHYROID ADENOMA: ICD-10-CM

## 2024-08-01 PROCEDURE — 1090F PRES/ABSN URINE INCON ASSESS: CPT | Performed by: INTERNAL MEDICINE

## 2024-08-01 PROCEDURE — 1160F RVW MEDS BY RX/DR IN RCRD: CPT | Performed by: INTERNAL MEDICINE

## 2024-08-01 PROCEDURE — 1170F FXNL STATUS ASSESSED: CPT | Performed by: INTERNAL MEDICINE

## 2024-08-01 PROCEDURE — 3288F FALL RISK ASSESSMENT DOCD: CPT | Performed by: INTERNAL MEDICINE

## 2024-08-01 PROCEDURE — 1003F LEVEL OF ACTIVITY ASSESS: CPT | Performed by: INTERNAL MEDICINE

## 2024-08-01 PROCEDURE — 1159F MED LIST DOCD IN RCRD: CPT | Performed by: INTERNAL MEDICINE

## 2024-08-01 PROCEDURE — 1125F AMNT PAIN NOTED PAIN PRSNT: CPT | Performed by: INTERNAL MEDICINE

## 2024-08-01 PROCEDURE — 3079F DIAST BP 80-89 MM HG: CPT | Performed by: INTERNAL MEDICINE

## 2024-08-01 PROCEDURE — G0439 PPPS, SUBSEQ VISIT: HCPCS | Performed by: INTERNAL MEDICINE

## 2024-08-01 PROCEDURE — 3074F SYST BP LT 130 MM HG: CPT | Performed by: INTERNAL MEDICINE

## 2024-08-01 PROCEDURE — 73630 X-RAY EXAM OF FOOT: CPT

## 2024-08-01 PROCEDURE — 99214 OFFICE O/P EST MOD 30 MIN: CPT | Performed by: INTERNAL MEDICINE

## 2024-08-01 PROCEDURE — 3725F SCREEN DEPRESSION PERFORMED: CPT | Performed by: INTERNAL MEDICINE

## 2024-08-01 RX ORDER — METOPROLOL SUCCINATE 50 MG/1
50 TABLET, EXTENDED RELEASE ORAL DAILY
Qty: 90 TABLET | Refills: 3 | Status: SHIPPED | OUTPATIENT
Start: 2024-08-01

## 2024-08-01 RX ORDER — TORSEMIDE 10 MG/1
10 TABLET ORAL DAILY
Qty: 90 TABLET | Refills: 3 | Status: SHIPPED | OUTPATIENT
Start: 2024-08-01

## 2024-08-01 RX ORDER — LOSARTAN POTASSIUM 50 MG/1
50 TABLET ORAL DAILY
Qty: 100 TABLET | Refills: 3 | Status: SHIPPED | OUTPATIENT
Start: 2024-08-01

## 2024-08-01 RX ORDER — CAL/D3/MAG11/ZINC/COP/MANG/BOR 600 MG-800
1 TABLET ORAL 2 TIMES DAILY WITH MEALS
Qty: 200 TABLET | Refills: 6 | Status: SHIPPED | OUTPATIENT
Start: 2024-08-01

## 2024-08-01 RX ORDER — ATORVASTATIN CALCIUM 20 MG/1
20 TABLET, FILM COATED ORAL DAILY
Qty: 100 TABLET | Refills: 3 | Status: SHIPPED | OUTPATIENT
Start: 2024-08-01

## 2024-08-01 NOTE — TELEPHONE ENCOUNTER
Patient requesting her X-ray foot orders.  Patient informed if she goes to [facility] she does not need her order slip.

## 2024-08-01 NOTE — PATIENT INSTRUCTIONS
Medicare Preventive Visit Patient Instructions  Thank you for completing your Welcome to Medicare Visit or Medicare Annual Wellness Visit today. Your next wellness visit will be due in one year (8/2/2025).  The screening/preventive services that you may require over the next 5-10 years are detailed below. Some tests may not apply to you based off risk factors and/or age. Screening tests ordered at today's visit but not completed yet may show as past due. Also, please note that scanned in results may not display below.  Preventive Screenings:  Service Recommendations Previous Testing/Comments   Colorectal Cancer Screening  * Colonoscopy    * Fecal Occult Blood Test (FOBT)/Fecal Immunochemical Test (FIT)  * Fecal DNA/Cologuard Test  * Flexible Sigmoidoscopy Age: 45-75 years old   Colonoscopy: every 10 years (may be performed more frequently if at higher risk)  OR  FOBT/FIT: every 1 year  OR  Cologuard: every 3 years  OR  Sigmoidoscopy: every 5 years  Screening may be recommended earlier than age 45 if at higher risk for colorectal cancer. Also, an individualized decision between you and your healthcare provider will decide whether screening between the ages of 76-85 would be appropriate. Colonoscopy: 06/05/2023  FOBT/FIT: Not on file  Cologuard: Not on file  Sigmoidoscopy: Not on file    Screening Current     Breast Cancer Screening Age: 40+ years old  Frequency: every 1-2 years  Not required if history of left and right mastectomy Mammogram: 08/22/2023    Screening Current   Cervical Cancer Screening Between the ages of 21-29, pap smear recommended once every 3 years.   Between the ages of 30-65, can perform pap smear with HPV co-testing every 5 years.   Recommendations may differ for women with a history of total hysterectomy, cervical cancer, or abnormal pap smears in past. Pap Smear: Not on file    Screening Not Indicated   Hepatitis C Screening Once for adults born between 1945 and 1965  More frequently in  patients at high risk for Hepatitis C Hep C Antibody: 05/10/2019    Screening Current   Diabetes Screening 1-2 times per year if you're at risk for diabetes or have pre-diabetes Fasting glucose: 95 mg/dL (7/26/2024)  A1C: 5.6 (4/6/2023)  Screening Current   Cholesterol Screening Once every 5 years if you don't have a lipid disorder. May order more often based on risk factors. Lipid panel: 01/19/2024    Screening Current     Other Preventive Screenings Covered by Medicare:  Abdominal Aortic Aneurysm (AAA) Screening: covered once if your at risk. You're considered to be at risk if you have a family history of AAA.  Lung Cancer Screening: covers low dose CT scan once per year if you meet all of the following conditions: (1) Age 55-77; (2) No signs or symptoms of lung cancer; (3) Current smoker or have quit smoking within the last 15 years; (4) You have a tobacco smoking history of at least 20 pack years (packs per day multiplied by number of years you smoked); (5) You get a written order from a healthcare provider.  Glaucoma Screening: covered annually if you're considered high risk: (1) You have diabetes OR (2) Family history of glaucoma OR (3)  aged 50 and older OR (4)  American aged 65 and older  Osteoporosis Screening: covered every 2 years if you meet one of the following conditions: (1) You're estrogen deficient and at risk for osteoporosis based off medical history and other findings; (2) Have a vertebral abnormality; (3) On glucocorticoid therapy for more than 3 months; (4) Have primary hyperparathyroidism; (5) On osteoporosis medications and need to assess response to drug therapy.   Last bone density test (DXA Scan): 08/22/2023.  HIV Screening: covered annually if you're between the age of 15-65. Also covered annually if you are younger than 15 and older than 65 with risk factors for HIV infection. For pregnant patients, it is covered up to 3 times per  pregnancy.    Immunizations:  Immunization Recommendations   Influenza Vaccine Annual influenza vaccination during flu season is recommended for all persons aged >= 6 months who do not have contraindications   Pneumococcal Vaccine   * Pneumococcal conjugate vaccine = PCV13 (Prevnar 13), PCV15 (Vaxneuvance), PCV20 (Prevnar 20)  * Pneumococcal polysaccharide vaccine = PPSV23 (Pneumovax) Adults 19-65 yo with certain risk factors or if 65+ yo  If never received any pneumonia vaccine: recommend Prevnar 20 (PCV20)  Give PCV20 if previously received 1 dose of PCV13 or PPSV23   Hepatitis B Vaccine 3 dose series if at intermediate or high risk (ex: diabetes, end stage renal disease, liver disease)   Respiratory syncytial virus (RSV) Vaccine - COVERED BY MEDICARE PART D  * RSVPreF3 (Arexvy) CDC recommends that adults 60 years of age and older may receive a single dose of RSV vaccine using shared clinical decision-making (SCDM)   Tetanus (Td) Vaccine - COST NOT COVERED BY MEDICARE PART B Following completion of primary series, a booster dose should be given every 10 years to maintain immunity against tetanus. Td may also be given as tetanus wound prophylaxis.   Tdap Vaccine - COST NOT COVERED BY MEDICARE PART B Recommended at least once for all adults. For pregnant patients, recommended with each pregnancy.   Shingles Vaccine (Shingrix) - COST NOT COVERED BY MEDICARE PART B  2 shot series recommended in those 19 years and older who have or will have weakened immune systems or those 50 years and older     Health Maintenance Due:      Topic Date Due   • Breast Cancer Screening: Mammogram  08/22/2024   • DXA SCAN  08/22/2025   • Colorectal Cancer Screening  06/03/2028   • Hepatitis C Screening  Completed     Immunizations Due:      Topic Date Due   • COVID-19 Vaccine (3 - 2023-24 season) 09/01/2023   • Influenza Vaccine (1) 09/01/2024     Advance Directives   What are advance directives?  Advance directives are legal documents  that state your wishes and plans for medical care. These plans are made ahead of time in case you lose your ability to make decisions for yourself. Advance directives can apply to any medical decision, such as the treatments you want, and if you want to donate organs.   What are the types of advance directives?  There are many types of advance directives, and each state has rules about how to use them. You may choose a combination of any of the following:  Living will:  This is a written record of the treatment you want. You can also choose which treatments you do not want, which to limit, and which to stop at a certain time. This includes surgery, medicine, IV fluid, and tube feedings.   Durable power of  for healthcare (DPAHC):  This is a written record that states who you want to make healthcare choices for you when you are unable to make them for yourself. This person, called a proxy, is usually a family member or a friend. You may choose more than 1 proxy.  Do not resuscitate (DNR) order:  A DNR order is used in case your heart stops beating or you stop breathing. It is a request not to have certain forms of treatment, such as CPR. A DNR order may be included in other types of advance directives.  Medical directive:  This covers the care that you want if you are in a coma, near death, or unable to make decisions for yourself. You can list the treatments you want for each condition. Treatment may include pain medicine, surgery, blood transfusions, dialysis, IV or tube feedings, and a ventilator (breathing machine).  Values history:  This document has questions about your views, beliefs, and how you feel and think about life. This information can help others choose the care that you would choose.  Why are advance directives important?  An advance directive helps you control your care. Although spoken wishes may be used, it is better to have your wishes written down. Spoken wishes can be misunderstood, or  not followed. Treatments may be given even if you do not want them. An advance directive may make it easier for your family to make difficult choices about your care.   Weight Management   Why it is important to manage your weight:  Being overweight increases your risk of health conditions such as heart disease, high blood pressure, type 2 diabetes, and certain types of cancer. It can also increase your risk for osteoarthritis, sleep apnea, and other respiratory problems. Aim for a slow, steady weight loss. Even a small amount of weight loss can lower your risk of health problems.  How to lose weight safely:  A safe and healthy way to lose weight is to eat fewer calories and get regular exercise. You can lose up about 1 pound a week by decreasing the number of calories you eat by 500 calories each day.   Healthy meal plan for weight management:  A healthy meal plan includes a variety of foods, contains fewer calories, and helps you stay healthy. A healthy meal plan includes the following:  Eat whole-grain foods more often.  A healthy meal plan should contain fiber. Fiber is the part of grains, fruits, and vegetables that is not broken down by your body. Whole-grain foods are healthy and provide extra fiber in your diet. Some examples of whole-grain foods are whole-wheat breads and pastas, oatmeal, brown rice, and bulgur.  Eat a variety of vegetables every day.  Include dark, leafy greens such as spinach, kale, magi greens, and mustard greens. Eat yellow and orange vegetables such as carrots, sweet potatoes, and winter squash.   Eat a variety of fruits every day.  Choose fresh or canned fruit (canned in its own juice or light syrup) instead of juice. Fruit juice has very little or no fiber.  Eat low-fat dairy foods.  Drink fat-free (skim) milk or 1% milk. Eat fat-free yogurt and low-fat cottage cheese. Try low-fat cheeses such as mozzarella and other reduced-fat cheeses.  Choose meat and other protein foods that  are low in fat.  Choose beans or other legumes such as split peas or lentils. Choose fish, skinless poultry (chicken or turkey), or lean cuts of red meat (beef or pork). Before you cook meat or poultry, cut off any visible fat.   Use less fat and oil.  Try baking foods instead of frying them. Add less fat, such as margarine, sour cream, regular salad dressing and mayonnaise to foods. Eat fewer high-fat foods. Some examples of high-fat foods include french fries, doughnuts, ice cream, and cakes.  Eat fewer sweets.  Limit foods and drinks that are high in sugar. This includes candy, cookies, regular soda, and sweetened drinks.  Exercise:  Exercise at least 30 minutes per day on most days of the week. Some examples of exercise include walking, biking, dancing, and swimming. You can also fit in more physical activity by taking the stairs instead of the elevator or parking farther away from stores. Ask your healthcare provider about the best exercise plan for you.      © Copyright [employer] 2018 Information is for End User's use only and may not be sold, redistributed or otherwise used for commercial purposes. All illustrations and images included in CareNotes® are the copyrighted property of [employer] or [employer]      Osteoporosis Education   Osteoporosis  is a long-term medical condition that causes your bones to become weak, brittle, and more likely to fracture. Osteoporosis occurs when your body absorbs more bone than it makes. It is also caused by a lack of calcium and estrogen (female hormone).  Common symptoms include the following:  You may not have any signs or symptoms. You may break a bone after a muscle strain, bump, or fall. A break usually occurs in the hip, spine, or wrist. A collapsed vertebra (bone in your spine) may cause severe back pain or loss of height from bent posture.  Call your doctor if:    You have severe pain.   You have increasing pain after a fall.   You have pain when  you do your daily activities.   You have questions or concerns about your condition or care.  Diagnosis of osteoporosis:   Blood and urine tests  measure your calcium, vitamin D, and estrogen levels.    An x-ray or CT may show thinned bones or a fracture. You may be given contrast liquid to help the bones show up better in the pictures. Tell the healthcare provider if you have ever had an allergic reaction to contrast liquid. Do not enter the MRI room with anything metal. Metal can cause serious injury. Tell the healthcare provider if you have any metal in or on your body.    A bone density test  compares your bone thickness with what is expected for someone of your age, gender, and ethnicity.  Treatment for osteoporosis may include medicines to prevent bone loss, build new bone, and increase estrogen. These medicines help prevent fractures and may be given as a pill or injection. Ask your healthcare provider for more information on these medicines.  Prevent bone loss:  Eat healthy foods that are high in calcium.  This helps keep your bones strong. Good sources of calcium are milk, cheese, broccoli, tofu, almonds, and canned salmon and sardines. Recommended to get at least 1200mg daily of calcium.  Increase your vitamin D intake.  Vitamin D is in fish oils, some vegetables, and fortified milk, cereal, and bread. Vitamin D is also formed in the skin when it is exposed to the sun. Ask your healthcare provider how much sunlight is safe for you. You will require at least 800 units of vitamin D daily taken as a supplement.  Drink liquids as directed.  Ask your healthcare provider how much liquid to drink each day and which liquids are best for you. Do not have alcohol or caffeine. They decrease bone mineral density, which can weaken your bones.  Exercise regularly.  Ask your healthcare provider about the best exercise plan for you. Weight bearing exercise for 30 minutes, 3 times a week can help build and strengthen  "bone.  Do not smoke.  Nicotine and other chemicals in cigarettes and cigars can cause lung damage. Ask your healthcare provider for information if you currently smoke and need help to quit. E-cigarettes or smokeless tobacco still contain nicotine. Talk to your healthcare provider before you use these products.  Go to physical therapy as directed.  A physical therapist teaches you exercises to help improve movement and muscle strength.  Alcohol. It is recommended to avoid heavy alcohol use as increased consumption of alcohol is known to cause bone loss  © Copyright [employer] 2021 Information is for End User's use only and may not be sold, redistributed or otherwise used for commercial purposes. All illustrations and images included in CareNotes® are the copyrighted property of [employer] or [employer]    Calcium and vitamin D for bone health (Beyond the Basics)    CALCIUM AND VITAMIN D OVERVIEW  Osteoporosis is a common bone disorder that causes a progressive loss in bone density and mass. As a result, bones become thin, weakened, and easily fractured. It is estimated that more than 1.3 million osteoporosis-associated (or \"osteoporotic\") fractures occur every year in the [place], primarily of bone within the spine (the vertebrae), the hip, and the forearm near the wrist. =    A number of treatments can help to prevent loss of bone and treat low bone mass. However, the first step in preventing or treating osteoporosis is to consume foods and drinks that provide calcium, a mineral essential for bone strength, and vitamin D, which aids in calcium breakdown and absorption. =    CALCIUM AND VITAMIN D BENEFITS  Good nutrition is important at all ages to keep the bones healthy.    Taking calcium reduces bone loss and decreases the risk of fracturing the vertebrae (the bones that surround the spinal cord).  Consuming calcium during childhood (eg, in milk) can lead to higher bone mass in adulthood. This " increase in bone density can reduce the risk of fractures later in life.  Calcium may also have benefits in other body systems by reducing blood pressure and cholesterol levels.  Calcium and vitamin D supplements may help prevent tooth loss in older adults.    RECOMMENDATIONS FOR CALCIUM    General recommendations -- Premenopausal women and men should consume at least 1000 mg of calcium, while postmenopausal women should consume 1200 mg (total diet plus supplement). You should not consume more than 2000 mg of calcium per day (total diet plus supplement) due to the risk of side effects.    Calcium in the diet -- The primary sources of calcium in the diet include milk and other dairy products, such as hard cheese, cottage cheese, or yogurt, as well as green vegetables, such as kale and broccoli (table 1). Some cereals, soy products, and fruit juices are fortified with calcium.    Calcium supplements -- The body is able to absorb calcium contained in supplements as well as from dietary sources. If it is not possible to get enough calcium from dietary sources, consult a health care provider to determine the best type, dose, and timing of calcium supplements.     Calcium carbonate is effective and is the least expensive form of calcium. It is best absorbed with a low-iron meal (such as breakfast). Calcium carbonate may not be absorbed well in people who also take a specific medication for gastroesophageal reflux (called a proton pump inhibitor or H2 blocker), which blocks stomach acid.  Many natural calcium carbonate preparations such as oyster shells or bone meal contain some lead.  Calcium citrate is well absorbed in the fasting state as well as with a meal.  Calcium doses above 500 mg are not absorbed as well as smaller doses, so large doses of supplements should be taken in divided doses (eg, in the morning and evening).  Calcium supplements do not replace other osteoporosis treatments such as hormone replacement,  bisphosphonates (eg, risedronate [sample brand name: Actonel] and alendronate [brand name: Fosamax]), and raloxifene (brand name: Evista).    Calcium and vitamin D supplements alone are usually insufficient to prevent age-related bone loss, although they may be beneficial in some subgroups (older adults, those with very low intake). In most patients with or at risk for osteoporosis, the addition of medication or hormonal therapy is necessary in order to slow the breakdown and removal of bone (ie, resorption).     Underlying gastrointestinal diseases -- Patients who do not adequately absorb nutrients from the gastrointestinal tract (due to malabsorption) may require more than 1000 to 1200 mg of calcium per day. In such cases, a health care provider can help to determine the optimal dose of calcium.    Medications -- All medications should be discussed with a health care provider to ensure that possible interactions with calcium are identified. Certain medications change the amount of calcium that is absorbed and/or excreted. As an example, loop diuretics (eg, furosemide [sample brand name: Lasix]) increase the amount of calcium excreted in the urine.    On the other hand, thiazide diuretics (eg, hydrochlorothiazide) can reduce levels of calcium in the urine, potentially reducing the risk of bone loss and kidney stones.    Side effects of calcium -- Calcium is usually easily tolerated when it is taken in divided doses several times per day. Some people experience side effects related to calcium, including constipation and indigestion. Calcium supplements interfere with the absorption of iron and thyroid hormone, and therefore, these medications should be taken at different times.    Kidney stones -- There is no evidence that consuming large amounts of calcium (from foods and drinks) increases the risk of kidney stones, or that avoiding dietary calcium decreases the risk. In fact, avoiding dairy products is likely to  increase the risk of kidney stones.    However, use of calcium supplements may increase the risk of kidney stones in susceptible individuals by raising the level of calcium in the urine. This is particularly true if the supplement is taken between meals or at bedtime, and this is one of the reasons we prefer for patients to get as much of their calcium requirement through dietary means.     IMPORTANCE OF VITAMIN D  Vitamin D decreases bone loss and lowers the risk of fracture, especially in older men and women. Along with calcium, vitamin D also helps to prevent and treat osteoporosis. To absorb calcium efficiently, an adequate amount of vitamin D must be present.    Vitamin D is normally made in the skin after exposure to sunlight.    Recommendations for vitamin D -- The current recommendation is that men over 70 years and postmenopausal women consume at least 800 international units (20 micrograms) of vitamin D per day. Lower levels of vitamin D are not as effective, while high doses can be toxic, especially if taken for long periods of time. Although the optimal intake has not been clearly established in premenopausal women or in younger men with osteoporosis, 600 international units (15 micrograms) of vitamin D daily is generally suggested.    Vitamin D is available as an individual supplement and is included in most multivitamins and some calcium supplements (table 2). Milk is a relatively good dietary source of vitamin D, with approximately 100 international units (2.5 micrograms) per cup (240 mL), and salmon has 800 to 1000 international units (20 to 25 micrograms) of vitamin D per serving.    Most healthy people don't need to check with their health care provider before taking standard doses of vitamin D and don't need monitoring of vitamin D levels if they are not being treated for vitamin D deficiency. However, recommendations for vitamin D supplementation may be different in people with certain underlying  medical conditions, such as sarcoidosis or lymphoma. In these situations, a provider will determine if supplements are needed; if so, the person's vitamin D and calcium levels should be monitored with regular tests.    ©2021 [employer] All rights reserved.

## 2024-08-01 NOTE — PROGRESS NOTES
Ambulatory Visit  Name: Rachelle Narayanan      : 1945      MRN: [id number]  Encounter Provider: Evangelist Shankar MD  Encounter Date: 2024   Encounter department: [facility] CARE [facility]    Assessment & Plan        Preventive health issues were discussed with patient, and age appropriate screening tests were ordered as noted in patient's After Visit Summary. Personalized health advice and appropriate referrals for health education or preventive services given if needed, as noted in patient's After Visit Summary.    History of Present Illness     HPI   Patient Care Team:  Evangelist Shankar MD as PCP - General (Internal Medicine)    Review of Systems  Medical History Reviewed by provider this encounter:       Annual Wellness Visit Questionnaire   Rachelle is here for her Subsequent Wellness visit.     Health Risk Assessment:   Patient rates overall health as fair. Patient feels that their physical health rating is slightly worse. Patient is satisfied with their life. Eyesight was rated as same. Hearing was rated as same. Patient feels that their emotional and mental health rating is same. Patients states they are never, rarely angry. Patient states they are often unusually tired/fatigued. Pain experienced in the last 7 days has been some. Patient's pain rating has been 7/10. Patient states that she has experienced no weight loss or gain in last 6 months.     Depression Screening:   PHQ-2 Score: 0      Fall Risk Screening:   In the past year, patient has experienced: no history of falling in past year      Urinary Incontinence Screening:   Patient has not leaked urine accidently in the last six months.     Home Safety:  Patient has trouble with stairs inside or outside of their home. Patient has working smoke alarms and has working carbon monoxide detector. Home safety hazards include: none.     Nutrition:   Current diet is Regular.     Medications:   Patient is currently taking  over-the-counter supplements. OTC medications include: see medication list. Patient is able to manage medications.     Activities of Daily Living (ADLs)/Instrumental Activities of Daily Living (IADLs):   Walk and transfer into and out of bed and chair?: Yes  Dress and groom yourself?: Yes    Bathe or shower yourself?: Yes    Feed yourself? Yes  Do your laundry/housekeeping?: Yes  Manage your money, pay your bills and track your expenses?: Yes  Make your own meals?: Yes    Do your own shopping?: Yes    Previous Hospitalizations:   Any hospitalizations or ED visits within the last 12 months?: No      Advance Care Planning:   Living will: Yes    Durable POA for healthcare: Yes    Advanced directive: Yes      PREVENTIVE SCREENINGS      Cardiovascular Screening:    General: Screening Current and Risks and Benefits Discussed      Diabetes Screening:     General: Screening Current and Risks and Benefits Discussed      Colorectal Cancer Screening:     General: Screening Current and Risks and Benefits Discussed      Breast Cancer Screening:     General: Screening Current and Risks and Benefits Discussed      Cervical Cancer Screening:    General: Screening Not Indicated and Risks and Benefits Discussed      Osteoporosis Screening:    General: Screening Not Indicated, History Osteoporosis and Risks and Benefits Discussed      Abdominal Aortic Aneurysm (AAA) Screening:        General: Risks and Benefits Discussed      Lung Cancer Screening:     General: Screening Not Indicated and Risks and Benefits Discussed      Hepatitis C Screening:    General: Screening Current and Risks and Benefits Discussed    Screening, Brief Intervention, and Referral to Treatment (SBIRT)    Screening  Typical number of drinks in a day: 0  Typical number of drinks in a week: 0  Interpretation: Low risk drinking behavior.    AUDIT-C Screenin) How often did you have a drink containing alcohol in the past year? monthly or less  2) How many drinks  "did you have on a typical day when you were drinking in the past year? 1 to 2  3) How often did you have 6 or more drinks on one occasion in the past year? never    AUDIT-C Score: 1  Interpretation: Score 0-2 (female): Negative screen for alcohol misuse    Single Item Drug Screening:  How often have you used an illegal drug (including marijuana) or a prescription medication for non-medical reasons in the past year? never    Single Item Drug Screen Score: 0  Interpretation: Negative screen for possible drug use disorder    Other Counseling Topics:   Car/seat belt/driving safety, skin self-exam, sunscreen and regular weightbearing exercise and calcium and vitamin D intake.     Social Determinants of Health     Financial Resource Strain: Low Risk  (5/11/2023)    Overall Financial Resource Strain (CARDIA)     Difficulty of Paying Living Expenses: Not hard at all   Food Insecurity: Patient Declined (7/30/2024)    Hunger Vital Sign     Worried About Running Out of Food in the Last Year: Patient declined     Ran Out of Food in the Last Year: Patient declined   Transportation Needs: Patient Declined (7/30/2024)    PRAPARE - Transportation     Lack of Transportation (Medical): Patient declined     Lack of Transportation (Non-Medical): Patient declined   Housing Stability: Patient Declined (7/30/2024)    Housing Stability Vital Sign     Unable to Pay for Housing in the Last Year: Patient declined     Homeless in the Last Year: Patient declined   Utilities: Patient Declined (7/30/2024)    [facility] Utilities     Threatened with loss of utilities: Patient declined     No results found.    Objective     Ht 4' 9\" (1.448 m)   BMI 32.68 kg/m²     Physical Exam      "

## 2024-08-01 NOTE — PROGRESS NOTES
Ambulatory Visit  Name: Rachelle Narayanan      : 1945      MRN: [id number]  Encounter Provider: Evangelist Shankar MD  Encounter Date: 2024   Encounter department: [facility] CARE [facility]    Assessment & Plan   1. Encounter for general adult medical examination with abnormal findings  Comments:  done in Detail  Life style mod  RTC in 3 mos w Blood  work  Orders:  -     UA (URINE) with reflex to Scope; Future  -     Magnesium; Future  -     TSH, 3rd generation; Future; Expected date: 2024  -     CBC and differential; Future; Expected date: 2024  -     Comprehensive metabolic panel; Future; Expected date: 2024  -     Lipid panel; Future; Expected date: 2024  2. Dyslipidemia  -     atorvastatin (LIPITOR) 20 mg tablet; Take 1 tablet (20 mg total) by mouth daily  3. Idiopathic osteoporosis  Comments:  Caltrate Plus D  daily, and Prolia Inj Q 6 mos  Orders:  -     Calcium Carbonate-Vit D-Min (Caltrate 600+D Plus Minerals) 600-800 MG-UNIT TABS; Take 1 tablet by mouth 2 (two) times a day with meals  -     denosumab (PROLIA) 60 mg/mL; Inject 1 mL (60 mg total) under the skin once for 1 dose  4. Benign essential hypertension  -     losartan (COZAAR) 50 mg tablet; Take 1 tablet (50 mg total) by mouth daily In the Morning..  5. S/P MVR (mitral valve repair)  Comments:  continue same  FU w cardiology  RTC in 3 mos w Blood work  Orders:  -     metoprolol succinate (TOPROL-XL) 50 mg 24 hr tablet; Take 1 tablet (50 mg total) by mouth daily In the evening daily  -     torsemide (DEMADEX) 10 mg tablet; Take 1 tablet (10 mg total) by mouth daily  6. Mitral valve prolapse  -     torsemide (DEMADEX) 10 mg tablet; Take 1 tablet (10 mg total) by mouth daily  7. Parathyroid adenoma  Comments:  Surgical Oncology Consult ASAP  Orders:  -     Ambulatory Referral to Surgical Oncology; Future  8. Pain in both feet  -     XR foot 3+ vw left; Future; Expected date: 2024  -     XR foot  "3+ vw right; Future; Expected date: 08/01/2024  AWV ; Done in Detail  Life style Mod  RTC in 3 mos w Blood work       History of Present Illness     79 Y O Lady is here for AWV and Regular Check Up, she feels OK,  recent Blood work and med list reviewed,...        Review of Systems   Constitutional:  Negative for chills, fatigue and fever.   HENT:  Negative for congestion, facial swelling, sore throat, trouble swallowing and voice change.    Eyes:  Negative for pain, discharge and visual disturbance.   Respiratory:  Negative for cough, shortness of breath and wheezing.    Cardiovascular:  Negative for chest pain, palpitations and leg swelling.   Gastrointestinal:  Negative for abdominal pain, blood in stool, constipation, diarrhea and nausea.   Endocrine: Negative for polydipsia, polyphagia and polyuria.   Genitourinary:  Negative for difficulty urinating, hematuria and urgency.   Musculoskeletal:  Negative for arthralgias and myalgias.   Skin:  Negative for rash.   Neurological:  Negative for dizziness, tremors, weakness and headaches.   Hematological:  Negative for adenopathy. Does not bruise/bleed easily.   Psychiatric/Behavioral:  Negative for dysphoric mood, sleep disturbance and suicidal ideas.        Objective     /82 (BP Location: Left arm, Patient Position: Sitting, Cuff Size: Standard)   Pulse 76   Temp 98 °F (36.7 °C) (Tympanic)   Resp 14   Ht 4' 9\" (1.448 m)   SpO2 99%   BMI 32.68 kg/m²     Physical Exam  Vitals and nursing note reviewed.   Constitutional:       General: She is not in acute distress.     Appearance: She is well-developed. She is not diaphoretic.   HENT:      Head: Normocephalic and atraumatic.      Right Ear: External ear normal.      Left Ear: External ear normal.      Nose: Nose normal.   Eyes:      General:         Right eye: No discharge.         Left eye: No discharge.      Extraocular Movements: EOM normal.      Conjunctiva/sclera: Conjunctivae normal.      Pupils: " Pupils are equal, round, and reactive to light.   Neck:      Thyroid: No thyromegaly.      Trachea: No tracheal deviation.   Cardiovascular:      Rate and Rhythm: Normal rate and regular rhythm.      Heart sounds: Murmur heard.      No friction rub.   Pulmonary:      Effort: Pulmonary effort is normal. No respiratory distress.      Breath sounds: Normal breath sounds. No stridor. No wheezing or rales.   Abdominal:      General: Bowel sounds are normal. There is no distension.      Palpations: Abdomen is soft.      Tenderness: There is no abdominal tenderness. There is no guarding.   Musculoskeletal:         General: No swelling, tenderness, deformity or edema. Normal range of motion.      Cervical back: Normal range of motion and neck supple.   Lymphadenopathy:      Cervical: No cervical adenopathy.   Skin:     General: Skin is warm and dry.      Capillary Refill: Capillary refill takes less than 2 seconds.      Coloration: Skin is not pale.      Findings: No erythema or rash.   Neurological:      Mental Status: She is alert and oriented to person, place, and time.      Cranial Nerves: No cranial nerve deficit.      Coordination: Coordination normal.   Psychiatric:         Mood and Affect: Mood and affect and mood normal.         Behavior: Behavior normal.       Administrative Statements     Answers submitted by the patient for this visit:  Medicare Annual Wellness Visit (Submitted on 7/30/2024)  How would you rate your overall health?: fair  Compared to last year, how is your physical health?: slightly worse  In general, how satisfied are you with your life?: satisfied  Compared to last year, how is your eyesight?: same  Compared to last year, how is your hearing?: same  Compared to last year, how is your emotional/mental health?: same  How often is anger a problem for you?: never, rarely  How often do you feel unusually tired/fatigued?: often  In the past 7 days, how much pain have you experienced?: some  If you  "answered \"some\" or \"a lot\", please rate the severity of your pain on a scale of 1 to 10 (1 being the least severe pain and 10 being the most intense pain).: 7/10  In the past 6 months, have you lost or gained 10 pounds without trying?: No  One or more falls in the last year: No  In the past 6 months, have you accidentally leaked urine?: No  Do you have trouble with the stairs inside or outside your home?: Yes  Does your home have working smoke alarms?: Yes  Does your home have a carbon monoxide monitor?: Yes  Which safety hazards (if any) have you experienced in your home? Please select all that apply.: none  How would you describe your current diet? Please select all that apply.: Regular  In addition to prescription medications, are you taking any over-the-counter supplements?: Yes  If yes, what supplements are you taking?: Vit c  Can you manage your medications?: Yes  Are you currently taking any opioid medications?: No  Can you walk and transfer into and out of your bed and chair?: Yes  Can you dress and groom yourself?: Yes  Can you bathe or shower yourself?: Yes  Can you feed yourself?: Yes  Can you do your laundry/ housekeeping?: Yes  Can you manage your money, pay your bills, and track your expenses?: Yes  Can you make your own meals?: Yes  Can you do your own shopping?: Yes  Within the last 12 months, have you had any hospitalizations or Emergency Department visits?: No  Do you have a living will?: Yes  Do you have a Durable POA (Power of ) for healthcare decisions?: Yes  Do you have an Advanced Directive for end of life decisions?: Yes  How often have you used an illegal drug (including marijuana) or a prescription medication for non-medical reasons in the past year?: never  What is the typical number of drinks you consume in a day?: 0  What is the typical number of drinks you consume in a week?: 0  How often did you have a drink containing alcohol in the past year?: monthly or less  How many drinks " did you have on a typical day  when you were drinking in the past year?: 1 to 2  How often did you have 6 or more drinks on one occasion in the past year?: never

## 2024-08-02 DIAGNOSIS — M79.671 PAIN IN BOTH FEET: Primary | ICD-10-CM

## 2024-08-02 DIAGNOSIS — M79.672 PAIN IN BOTH FEET: Primary | ICD-10-CM

## 2024-08-05 ENCOUNTER — CLINICAL SUPPORT (OUTPATIENT)
Dept: FAMILY MEDICINE CLINIC | Facility: CLINIC | Age: 79
End: 2024-08-05
Payer: COMMERCIAL

## 2024-08-05 DIAGNOSIS — M81.8 IDIOPATHIC OSTEOPOROSIS: Primary | ICD-10-CM

## 2024-08-05 PROCEDURE — 96372 THER/PROPH/DIAG INJ SC/IM: CPT

## 2024-08-09 PROBLEM — E21.3 HYPERPARATHYROIDISM (HCC): Status: ACTIVE | Noted: 2024-08-09

## 2024-08-12 ENCOUNTER — OFFICE VISIT (OUTPATIENT)
Dept: SURGICAL ONCOLOGY | Facility: CLINIC | Age: 79
End: 2024-08-12
Payer: COMMERCIAL

## 2024-08-12 VITALS
OXYGEN SATURATION: 97 % | BODY MASS INDEX: 32.79 KG/M2 | DIASTOLIC BLOOD PRESSURE: 84 MMHG | HEIGHT: 57 IN | WEIGHT: 152 LBS | SYSTOLIC BLOOD PRESSURE: 120 MMHG | HEART RATE: 84 BPM

## 2024-08-12 DIAGNOSIS — D35.1 PARATHYROID ADENOMA: ICD-10-CM

## 2024-08-12 DIAGNOSIS — E21.3 HYPERPARATHYROIDISM (HCC): Primary | ICD-10-CM

## 2024-08-12 PROCEDURE — 99205 OFFICE O/P NEW HI 60 MIN: CPT | Performed by: SURGERY

## 2024-08-12 NOTE — PROGRESS NOTES
Surgical Oncology Consult Note       [facility]  [facility]  [address] [id number]    Rachelle Narayanan  1945  [id number]      No chief complaint on file.       Assessment & Plan    1. Hyperparathyroidism (HCC)  2. Parathyroid adenoma  Comments:  Surgical Oncology Consult ASAP  Orders:  -     Ambulatory Referral to Surgical Oncology       Oncology History    No history exists.        Is a very pleasant 73-year-old retired person who had symptoms and signs of hyper calcium anemia and hyperparathyroidism.  Her most recent PTH is 121.6 with calcium was 10.2.  She had sestamibi scan demonstrated possible right lower pole as well as left upper and large thyroid adenomas.  She also has a history of multinodular goiter and being followed thyroid nodules with serial ultrasound at an outside institution.  As per patient and her daughter who is a nurse states this has been biopsied and were told benign.  She has severe aches and pain depression fogginess constipation anxiety all typical symptoms for hypercalcemia.          Review of Systems   Constitutional:  Negative for chills and fever.   HENT:  Negative for ear pain and sore throat.    Eyes:  Negative for pain and visual disturbance.   Respiratory:  Negative for cough and shortness of breath.    Cardiovascular:  Negative for chest pain and palpitations.   Gastrointestinal:  Negative for abdominal pain and vomiting.   Genitourinary:  Negative for dysuria and hematuria.   Musculoskeletal:  Negative for arthralgias and back pain.   Skin:  Negative for color change and rash.   Neurological:  Negative for seizures and syncope.   Hematological:  Positive for adenopathy.   All other systems reviewed and are negative.       Past Medical History:      Patient Active Problem List   Diagnosis    Benign essential hypertension    Vitamin D insufficiency    Dyslipidemia    Other osteoporosis  without current pathological fracture    Seasonal allergies    Elevated parathyroid hormone    Varicose veins of left lower extremity with pain    Bilateral lower extremity edema    S/P MVR (mitral valve repair)    Mitral valve prolapse    Chronic heart failure with preserved ejection fraction (HCC)    Age-related osteoporosis without current pathological fracture    Cavovarus deformity of foot, acquired, left    Pain of left hip    Chronic left-sided low back pain without sciatica    Hyperparathyroidism (HCC)        Past Medical History:   Diagnosis Date    Arthritis     Cellulitis of face 10/16/2019    Bilateral nares    Family history of colon cancer in mother     Functional murmur     Hypercalcemia 2016    Hypertension     Dx'd in , controlled     Osteoporosis     Screening for breast cancer 2018    SOB (shortness of breath) 2020    Viral upper respiratory tract infection 10/30/2018        Past Surgical History:   Procedure Laterality Date    CARDIAC SURGERY       SECTION      x2,  &     FL GUIDED NEEDLE PLAC BX/ASP/INJ  2024    FLEXIBLE SIGMOIDOSCOPY      MITRAL VALVE REPAIR      ID COLONOSCOPY FLX DX W/COLLJ SPEC WHEN PFRMD N/A 10/10/2016    Procedure: COLONOSCOPY;  Surgeon: Patsy Escobedo MD;  [facility];  Service: Gastroenterology    TOE SURGERY Bilateral     hammer toe correction surgery    TONSILLECTOMY      with Adenoidectomy        Family History   Problem Relation Age of Onset    Colon cancer Mother 73    Aneurysm Father     No Known Problems Sister     No Known Problems Paternal Aunt     No Known Problems Paternal Aunt     No Known Problems Paternal Aunt     No Known Problems Paternal Aunt     No Known Problems Maternal Grandmother     No Known Problems Maternal Grandfather     No Known Problems Paternal Grandmother     No Known Problems Paternal Grandfather     No Known Problems Daughter     No Known Problems Son     No Known Problems Son         Social  History     Socioeconomic History    Marital status: /Civil Union     Spouse name: Not on file    Number of children: Not on file    Years of education: Not on file    Highest education level: Not on file   Occupational History    Not on file   Tobacco Use    Smoking status: Never     Passive exposure: Past    Smokeless tobacco: Never   Vaping Use    Vaping status: Never Used   Substance and Sexual Activity    Alcohol use: Not Currently     Comment: Rarely     Drug use: No    Sexual activity: Not Currently     Partners: Male     Birth control/protection: None   Other Topics Concern    Not on file   Social History Narrative    Caffeine use via coffee, 1 serv/day    Exercises regularly, walking      Social Determinants of Health     Financial Resource Strain: Low Risk  (5/11/2023)    Overall Financial Resource Strain (CARDIA)     Difficulty of Paying Living Expenses: Not hard at all   Food Insecurity: No Food Insecurity (8/1/2024)    Hunger Vital Sign     Worried About Running Out of Food in the Last Year: Never true     Ran Out of Food in the Last Year: Never true   Transportation Needs: No Transportation Needs (8/1/2024)    PRAPARE - Transportation     Lack of Transportation (Medical): No     Lack of Transportation (Non-Medical): No   Physical Activity: Not on file   Stress: Not on file   Social Connections: Not on file   Intimate Partner Violence: Not on file   Housing Stability: Low Risk  (8/1/2024)    Housing Stability Vital Sign     Unable to Pay for Housing in the Last Year: No     Number of Times Moved in the Last Year: 1     Homeless in the Last Year: No        Current Outpatient Medications:     aspirin (ECOTRIN LOW STRENGTH) 81 mg EC tablet, Take 1 tablet (81 mg total) by mouth daily, Disp: 90 tablet, Rfl: 3    atorvastatin (LIPITOR) 20 mg tablet, Take 1 tablet (20 mg total) by mouth daily, Disp: 100 tablet, Rfl: 3    Calcium Carbonate-Vit D-Min (Caltrate 600+D Plus Minerals) 600-800 MG-UNIT TABS,  Take 1 tablet by mouth 2 (two) times a day with meals, Disp: 200 tablet, Rfl: 6    chlorhexidine (PERIDEX) 0.12 % solution, Apply 15 mL to the mouth or throat 2 (two) times a day, Disp: , Rfl:     Coenzyme Q10 10 MG capsule, Take 1 capsule (10 mg total) by mouth daily, Disp: 90 capsule, Rfl: 3    denosumab (PROLIA) 60 mg/mL, Inject 1 mL (60 mg total) under the skin once for 1 dose, Disp: 1 mL, Rfl: 1    Diclofenac Sodium (VOLTAREN) 1 %, Apply 2 g topically 4 (four) times a day, Disp: 200 g, Rfl: 3    losartan (COZAAR) 50 mg tablet, Take 1 tablet (50 mg total) by mouth daily In the Morning.., Disp: 100 tablet, Rfl: 3    metoprolol succinate (TOPROL-XL) 50 mg 24 hr tablet, Take 1 tablet (50 mg total) by mouth daily In the evening daily, Disp: 90 tablet, Rfl: 3    torsemide (DEMADEX) 10 mg tablet, Take 1 tablet (10 mg total) by mouth daily, Disp: 90 tablet, Rfl: 3    vitamin B-12 (VITAMIN B-12) 1,000 mcg tablet, Take 1 tablet (1,000 mcg total) by mouth daily, Disp: 90 tablet, Rfl: 3     Allergies   Allergen Reactions    Poison Ivy Extract Rash       Physical Exam:   There were no vitals filed for this visit.  Physical Exam  Constitutional:       Appearance: Normal appearance.   HENT:      Head: Normocephalic and atraumatic.      Nose: Nose normal.      Mouth/Throat:      Mouth: Mucous membranes are moist.   Eyes:      Pupils: Pupils are equal, round, and reactive to light.   Neck:      Comments:  neck is supple trachea is midline no palpable adenopathy no palpable mass or masses  Cardiovascular:      Rate and Rhythm: Normal rate.      Pulses: Normal pulses.      Heart sounds: Normal heart sounds.   Pulmonary:      Effort: Pulmonary effort is normal.      Breath sounds: Normal breath sounds.   Abdominal:      General: Bowel sounds are normal.      Palpations: Abdomen is soft.   Musculoskeletal:         General: Normal range of motion.      Cervical back: Normal range of motion and neck supple.   Skin:     General: Skin  is warm.   Neurological:      General: No focal deficit present.      Mental Status: She is alert and oriented to person, place, and time.   Psychiatric:         Mood and Affect: Mood normal.         Behavior: Behavior normal.         Thought Content: Thought content normal.         Judgment: Judgment normal.         Results:   PARATHYROID SCAN     INDICATION:  R79.89: Other specified abnormal findings of blood chemistry. Elevated PTH.     COMPARISON: Thyroid ultrasound 08/08/2023, NM parathyroid scan 05/19/2023     TECHNIQUE:   Following the intravenous administration of 25.3 mCi Tc-99m Cardiolite, anterior and bilateral anterior oblique projection images of the neck and mediastinum were obtained at approximately 10 minutes post injection followed at 2 hours post   injection by static anterior and bilateral oblique projections as well as SPECT-CT images in coronal, sagittal and axial projections.     FINDINGS:     EARLY: Fairly homogeneous radiotracer uptake in the thyroid gland.     DELAYED: Washout of thyroid gland activity. Now seen is retained radiotracer at the inferoposterior aspect of the right thyroid lobe. Also, slightly milder focus of uptake at the superoposterior aspect of the left thyroid lobe.     On SPECT-CT these extend posteriorly from the thyroid region suspicious for bilateral parathyroid adenomas.        IMPRESSION:     1. Foci of increased radiotracer uptake at the right lower pole region and left upper pole region. Finding suspicious for parathyroid adenomas.          Discussion/Summary:   Here we have a 79-year-old female with hypercalcemia and hyperparathyroidism.  We did discuss pathogenesis of hypercalcemia hyperparathyroidism and possible parathyroid adenomas in detail.  We also discussed pathophysiology of thyroid nodules and further follow-up and management.  With regard to parathyroid we will obtain CT parathyroid to delineate this thyroid adenomas.  We will follow her after that.  I  did review the nuclear scan in detail.  All patient's and her daughter's questions answered to satisfaction.  We will try to obtain CT parathyroid as soon as possible and follow her.  Patient's daughter states that they probably have thyroid biopsy results and they will bring it to us at her next visit.  I also educated them to keep or oral calcium to minimum to reduce dairy product intake and keep well-hydrated.  They understand and agreed to do so    Advance Care Planning/Advance Directives:  I Luis Miguel Costa MD discussed the disease status, and treatment plans with Rachelle Narayanan today 08/12/24 and will follow-up with the patient.

## 2024-08-13 ENCOUNTER — HOSPITAL ENCOUNTER (OUTPATIENT)
Dept: CT IMAGING | Facility: HOSPITAL | Age: 79
Discharge: HOME | End: 2024-08-13
Attending: SURGERY
Payer: COMMERCIAL

## 2024-08-13 DIAGNOSIS — D35.1 PARATHYROID ADENOMA: ICD-10-CM

## 2024-08-13 PROCEDURE — 70492 CT SFT TSUE NCK W/O & W/DYE: CPT

## 2024-08-13 RX ADMIN — IOHEXOL 100 ML: 350 INJECTION, SOLUTION INTRAVENOUS at 07:34

## 2024-08-16 ENCOUNTER — DOCUMENTATION (OUTPATIENT)
Dept: HEMATOLOGY ONCOLOGY | Facility: CLINIC | Age: 79
End: 2024-08-16

## 2024-08-16 NOTE — PROGRESS NOTES
Pathology slides received from [facility] at [facility] incorrectly. Forwarded to [facility] Path Dept at  [address].

## 2024-08-20 ENCOUNTER — TELEPHONE (OUTPATIENT)
Dept: SURGICAL ONCOLOGY | Facility: CLINIC | Age: 79
End: 2024-08-20

## 2024-08-20 NOTE — TELEPHONE ENCOUNTER
Called and spoke to representative. Stated I requested slides on 8/13. Representative checked with pathology who stated it was sent out on 8/14. Called [facility] pathology department and stated she had not received the slides yet.

## 2024-08-22 PROBLEM — R79.89 ELEVATED PARATHYROID HORMONE: Status: RESOLVED | Noted: 2017-11-10 | Resolved: 2024-08-22

## 2024-08-23 ENCOUNTER — OFFICE VISIT (OUTPATIENT)
Dept: SURGICAL ONCOLOGY | Facility: CLINIC | Age: 79
End: 2024-08-23
Payer: COMMERCIAL

## 2024-08-23 ENCOUNTER — OFFICE VISIT (OUTPATIENT)
Dept: LAB | Facility: AMBULARY SURGERY CENTER | Age: 79
End: 2024-08-23
Payer: COMMERCIAL

## 2024-08-23 VITALS — SYSTOLIC BLOOD PRESSURE: 132 MMHG | DIASTOLIC BLOOD PRESSURE: 88 MMHG | HEART RATE: 82 BPM | OXYGEN SATURATION: 95 %

## 2024-08-23 DIAGNOSIS — Z01.818 PRE-OP EXAMINATION: ICD-10-CM

## 2024-08-23 DIAGNOSIS — E21.3 HYPERPARATHYROIDISM (HCC): Primary | ICD-10-CM

## 2024-08-23 PROCEDURE — 99204 OFFICE O/P NEW MOD 45 MIN: CPT | Performed by: SURGERY

## 2024-08-23 RX ORDER — CALCIUM CARBONATE 500 MG/1
1 TABLET, CHEWABLE ORAL 3 TIMES DAILY
Qty: 90 TABLET | Refills: 0 | Status: SHIPPED | OUTPATIENT
Start: 2024-08-23

## 2024-08-23 RX ORDER — ACETAMINOPHEN AND CODEINE PHOSPHATE 300; 30 MG/1; MG/1
1 TABLET ORAL EVERY 6 HOURS PRN
Qty: 20 TABLET | Refills: 0 | Status: SHIPPED | OUTPATIENT
Start: 2024-08-23

## 2024-08-23 NOTE — PROGRESS NOTES
Surgical Oncology Follow Up  [address] [facility]  [facility] [place]  [address] [facility] [address]  [place] [address]    Rachelle Narayanan  1945  [id number]      No chief complaint on file.       Assessment & Plan:   This is 79-year-old female with hypercalcemia and hyperparathyroidism nuclear medicine study demonstrated possible right side lower parathyroid adenoma.  Further study with CT parathyroid demonstrated 0.8 cm right lower parathyroid adenoma as well as 1.1 cm left upper parathyroid adenoma approximately 1.1 cm retropharyngeal posterior to cervical esophagus.  Her most recent calcium is 10.2 with PTH level is 126.  Surgical consent was obtained for parathyroid gland resections intraoperative PTH and recurrent laryngeal nerve monitoring as well as poor gland possible exploration after explaining the benefit procedure alternative and possible complications.  We also discussed about her history of right mid thyroid lobe nodule which had a biopsy at an outside institution and consistent with benign atypia undetermined significance for cancer category 2.  Patient also stated as well as her daughter in case any abnormal thyroid nodules and if I did decide patient need possible thyroid lobectomy partially to proceed with that as well.    Cancer History:     Oncology History    No history exists.         Interval History:   Follow-up with parathyroid localization studies.    Review of Systems:   Review of Systems   Constitutional:  Negative for chills and fever.   HENT:  Negative for ear pain and sore throat.    Eyes:  Negative for pain and visual disturbance.   Respiratory:  Negative for cough and shortness of breath.    Cardiovascular:  Negative for chest pain and palpitations.   Gastrointestinal:  Negative for abdominal pain and vomiting.   Genitourinary:  Negative for dysuria and hematuria.   Musculoskeletal:  Negative for arthralgias and back pain.   Skin:  Negative for color  change and rash.   Neurological:  Negative for seizures and syncope.   All other systems reviewed and are negative.    Past Medical History     Patient Active Problem List   Diagnosis   • Benign essential hypertension   • Vitamin D insufficiency   • Dyslipidemia   • Other osteoporosis without current pathological fracture   • Seasonal allergies   • Varicose veins of left lower extremity with pain   • Bilateral lower extremity edema   • S/P MVR (mitral valve repair)   • Mitral valve prolapse   • Chronic heart failure with preserved ejection fraction (HCC)   • Age-related osteoporosis without current pathological fracture   • Cavovarus deformity of foot, acquired, left   • Pain of left hip   • Chronic left-sided low back pain without sciatica   • Hyperparathyroidism (HCC)     Past Medical History:   Diagnosis Date   • Arthritis    • Cellulitis of face 10/16/2019    Bilateral nares   • Family history of colon cancer in mother    • Functional murmur    • Hypercalcemia 2016   • Hypertension     Dx'd in , controlled    • Osteoporosis    • Screening for breast cancer 2018   • SOB (shortness of breath) 2020   • Viral upper respiratory tract infection 10/30/2018     Past Surgical History:   Procedure Laterality Date   • CARDIAC SURGERY     •  SECTION      x2,  &    • FL GUIDED NEEDLE PLAC BX/ASP/INJ  2024   • FLEXIBLE SIGMOIDOSCOPY     • MITRAL VALVE REPAIR     • MT COLONOSCOPY FLX DX W/COLLJ SPEC WHEN PFRMD N/A 10/10/2016    Procedure: COLONOSCOPY;  Surgeon: Patsy Escobedo MD;  Location: [facility];  Service: Gastroenterology   • TOE SURGERY Bilateral     hammer toe correction surgery   • TONSILLECTOMY      with Adenoidectomy     Family History   Problem Relation Age of Onset   • Colon cancer Mother 73   • Cancer Mother    • Aneurysm Father    • No Known Problems Sister    • No Known Problems Paternal Aunt    • No Known Problems Paternal Aunt    • No Known Problems Paternal Aunt    •  No Known Problems Paternal Aunt    • No Known Problems Maternal Grandmother    • No Known Problems Maternal Grandfather    • No Known Problems Paternal Grandmother    • No Known Problems Paternal Grandfather    • No Known Problems Daughter    • No Known Problems Son    • No Known Problems Son      Social History     Socioeconomic History   • Marital status: /Civil Union     Spouse name: Not on file   • Number of children: Not on file   • Years of education: Not on file   • Highest education level: Not on file   Occupational History   • Not on file   Tobacco Use   • Smoking status: Never     Passive exposure: Past   • Smokeless tobacco: Never   Vaping Use   • Vaping status: Never Used   Substance and Sexual Activity   • Alcohol use: Not Currently     Comment: Rarely    • Drug use: No   • Sexual activity: Not Currently     Partners: Male     Birth control/protection: None   Other Topics Concern   • Not on file   Social History Narrative    Caffeine use via coffee, 1 serv/day    Exercises regularly, walking      Social Determinants of Health     Financial Resource Strain: Low Risk  (5/11/2023)    Overall Financial Resource Strain (CARDIA)    • Difficulty of Paying Living Expenses: Not hard at all   Food Insecurity: No Food Insecurity (8/1/2024)    Hunger Vital Sign    • Worried About Running Out of Food in the Last Year: Never true    • Ran Out of Food in the Last Year: Never true   Transportation Needs: No Transportation Needs (8/1/2024)    PRAPARE - Transportation    • Lack of Transportation (Medical): No    • Lack of Transportation (Non-Medical): No   Physical Activity: Not on file   Stress: Not on file   Social Connections: Not on file   Intimate Partner Violence: Not on file   Housing Stability: Low Risk  (8/1/2024)    Housing Stability Vital Sign    • Unable to Pay for Housing in the Last Year: No    • Number of Times Moved in the Last Year: 1    • Homeless in the Last Year: No       Current Outpatient  Medications:   •  aspirin (ECOTRIN LOW STRENGTH) 81 mg EC tablet, Take 1 tablet (81 mg total) by mouth daily, Disp: 90 tablet, Rfl: 3  •  atorvastatin (LIPITOR) 20 mg tablet, Take 1 tablet (20 mg total) by mouth daily, Disp: 100 tablet, Rfl: 3  •  Calcium Carbonate-Vit D-Min (Caltrate 600+D Plus Minerals) 600-800 MG-UNIT TABS, Take 1 tablet by mouth 2 (two) times a day with meals, Disp: 200 tablet, Rfl: 6  •  chlorhexidine (PERIDEX) 0.12 % solution, Apply 15 mL to the mouth or throat 2 (two) times a day, Disp: , Rfl:   •  Coenzyme Q10 10 MG capsule, Take 1 capsule (10 mg total) by mouth daily, Disp: 90 capsule, Rfl: 3  •  denosumab (PROLIA) 60 mg/mL, Inject 1 mL (60 mg total) under the skin once for 1 dose, Disp: 1 mL, Rfl: 1  •  Diclofenac Sodium (VOLTAREN) 1 %, Apply 2 g topically 4 (four) times a day, Disp: 200 g, Rfl: 3  •  losartan (COZAAR) 50 mg tablet, Take 1 tablet (50 mg total) by mouth daily In the Morning.., Disp: 100 tablet, Rfl: 3  •  metoprolol succinate (TOPROL-XL) 50 mg 24 hr tablet, Take 1 tablet (50 mg total) by mouth daily In the evening daily, Disp: 90 tablet, Rfl: 3  •  torsemide (DEMADEX) 10 mg tablet, Take 1 tablet (10 mg total) by mouth daily, Disp: 90 tablet, Rfl: 3  •  vitamin B-12 (VITAMIN B-12) 1,000 mcg tablet, Take 1 tablet (1,000 mcg total) by mouth daily, Disp: 90 tablet, Rfl: 3  Allergies   Allergen Reactions   • Poison Ivy Extract Rash       Physical Exam:   There were no vitals filed for this visit.  Physical Exam  Constitutional:       Appearance: Normal appearance.   HENT:      Head: Normocephalic and atraumatic.      Nose: Nose normal.      Mouth/Throat:      Mouth: Mucous membranes are moist.   Eyes:      Pupils: Pupils are equal, round, and reactive to light.   Neck:      Comments: Neck is supple no palpable masses trachea is midline no palpable thyroid nodule no JVD no palpable adenopathy  Cardiovascular:      Rate and Rhythm: Normal rate.      Pulses: Normal pulses.       Heart sounds: Normal heart sounds.   Pulmonary:      Effort: Pulmonary effort is normal.      Breath sounds: Normal breath sounds.   Abdominal:      General: Bowel sounds are normal.      Palpations: Abdomen is soft.   Musculoskeletal:         General: Normal range of motion.      Cervical back: Normal range of motion and neck supple.   Skin:     General: Skin is warm.   Neurological:      General: No focal deficit present.      Mental Status: She is alert and oriented to person, place, and time.   Psychiatric:         Mood and Affect: Mood normal.         Behavior: Behavior normal.         Thought Content: Thought content normal.         Judgment: Judgment normal.       Results & Discussion:   CTA  NECK  WITHOUT AND WITH CONTRAST FROM NEPTALI TO SKULL BASE     INDICATION: Hyperparathyroidism. Parathyroid hormone is 121.6 on laboratory examination dated 7/26/2024.     COMPARISON: NM parathyroid with CT LOC 5/23/2024. Ultrasound thyroid 8/8/2023. NM parathyroid scan with SPECT 5/19/2023.     TECHNIQUE:  Both noncontrast, contrast, and post contrast delayed images were performed according to standard parathyroid protocol (4D technique) Coronal and sagittal reconstructions were performed. 3D reconstructions were performed on an independant   workstation, and are supplied for review.  This examination, like all CT scans performed in the [facility], was performed utilizing techniques to minimize radiation dose exposure, including the use of iterative reconstruction and   automated exposure control.  Rad dose 1395 mGy-cm     IV Contrast:  100 mL of iohexol (OMNIPAQUE)     FINDINGS:     SERIAL NONCONTRAST, POST CONTRAST AND DELAYS: 0.8 cm enhancing lesion posteriorly adjacent to right lower thyroid lobe and extending inferiorly in close proximity to the posterior border of brachiocephalic artery (3:124) - characteristics of this lesion   are noncontrast hypodense, arterially enhancing, and slight venous  washout with adjacent polar vessel.     1.1 cm enhancing lesion in left retropharyngeal region just posterior to the lower cervical esophagus at at C5-C6 level (3:160) - characteristics of this lesion are noncontrast hypodense, arterially enhancing, and slight venous washout with adjacent   polar vessel.     VASCULAR STRUCTURES:  Normal enhancement of the cervical vasculature. Minor calcified atherosclerotic disease of aortic arch. Slight retropharyngeal course of left carotid artery, normal variant.     VISUALIZED BRAIN PARENCHYMA:  Normal.     VISUALIZED PARANASAL SINUSES: Clear visualized sinuses. 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     NASAL CAVITY AND NASOPHARYNX: Normal.     SUPRAHYOID NECK: Dental amalgam with extensive beam hardening artifact limits evaluation of anterior oral cavity for which direct visualization is recommended; otherwise, normal visualized oral cavity within limitations. Normal oropharynx and   parapharyngeal spaces. Please see above discussion regarding left retropharyngeal space.     INFRAHYOID NECK: Larynx and hypopharynx are normal. Glottic and subglottic airway are normal.     THYROID GLAND: 1.3 cm heterogeneously enhancing nodule in right thyroid lobe (4:81). 1.1 cm heterogeneously enhancing nodule left thyroid lobe (4:64). Incidental discovery of one or more thyroid nodule(s) measuring less than 1.5 cm and without suspicious   features is noted in this patient who is above 35 years old; according to guidelines published in the February 2015 white paper on incidental thyroid nodules in the Journal of the American College of Radiology (JACR), no further evaluation is   recommended.     LYMPH NODES:  No pathologic or enlarged adenopathy.     MEDIASTINUM:  Unremarkable.     BONY STRUCTURES: Poststernotomy. Grade 1 anterolisthesis C3-C4, C4-C5, and C7-T1. Multilevel degenerative changes of cervical spine, severe at C5-C6.     LUNG APICES: 0.4 cm left upper lobe mixed  groundglass and solid pulmonary nodule (2:24).     NASCET criteria was used to determine the degree of internal carotid artery diameter stenosis.     IMPRESSION:     0.8 cm candidate parathyroid adenoma adjacent to the right lower thyroid lobe and extending inferiorly in close proximity to the posterior border of the brachiocephalic artery. This corresponds to uptake on parathyroid scan 5/23/2024     1.1 cm candidate parathyroid adenoma in left retropharyngeal region just posterior to the lower cervical esophagus at C5-C6 level. This corresponds to uptake on parathyroid scan 5/23/2024.     0.4 cm left upper lobe mixed groundglass and solid pulmonary nodule. Based on current Fleischner Society 2017 Guidelines on incidental pulmonary nodule, follow-up noncontrast CT is recommended at 3-6 months from the initial examination to confirm   persistence; if stable at that time (solid component remains <6mm), additional follow-up CT is recommended annually until 5 years of stability is demonstrated.     Suboptimal evaluation of oral cavity secondary to extensive beam hardening streak artifact. Recommend direct visualization.    PARATHYROID SCAN     INDICATION:  R79.89: Other specified abnormal findings of blood chemistry. Elevated PTH.     COMPARISON: Thyroid ultrasound 08/08/2023, NM parathyroid scan 05/19/2023     TECHNIQUE:   Following the intravenous administration of 25.3 mCi Tc-99m Cardiolite, anterior and bilateral anterior oblique projection images of the neck and mediastinum were obtained at approximately 10 minutes post injection followed at 2 hours post   injection by static anterior and bilateral oblique projections as well as SPECT-CT images in coronal, sagittal and axial projections.     FINDINGS:     EARLY: Fairly homogeneous radiotracer uptake in the thyroid gland.     DELAYED: Washout of thyroid gland activity. Now seen is retained radiotracer at the inferoposterior aspect of the right thyroid lobe. Also,  slightly milder focus of uptake at the superoposterior aspect of the left thyroid lobe.     On SPECT-CT these extend posteriorly from the thyroid region suspicious for bilateral parathyroid adenomas.        IMPRESSION:     1. Foci of increased radiotracer uptake at the right lower pole region and left upper pole region. Finding suspicious for parathyroid adenomas.     .6  Calcium 10.2    I did review CT parathyroid films as well as nuclear scan and discussed in detail .I did discussed in detail nature of hypercalcemia and hyperparathyroidism.Surgical consent was obtained after explaining benefits, alternative, procedure and possible complications with regards above mentioned procedure.  With regard to surgical consent procedure we also discussed in detail possible complications such as bleeding infection injury to the surrounding structures, permanent hypocalcemia.  Injury to recurrent laryngeal nerve bilaterally and its consequences.  Possible hoarseness permanent but also discussed and agree to proceed with surgery all her questions regarding the visit  as well as the  surgery were answered to  the patient's satisfaction.All patient questions were answered.       Advance Care Planning/Advance Directives:  I Luis Miguel Costa MD discussed the disease status with Rachelle Narayanan  today 08/23/24  treatment plans and follow-up with the patient.

## 2024-08-26 ENCOUNTER — APPOINTMENT (OUTPATIENT)
Dept: LAB | Facility: AMBULARY SURGERY CENTER | Age: 79
End: 2024-08-26
Payer: COMMERCIAL

## 2024-08-26 DIAGNOSIS — Z00.01 ENCOUNTER FOR GENERAL ADULT MEDICAL EXAMINATION WITH ABNORMAL FINDINGS: ICD-10-CM

## 2024-08-26 DIAGNOSIS — E21.3 HYPERPARATHYROIDISM (HCC): ICD-10-CM

## 2024-08-26 LAB
ALBUMIN SERPL BCG-MCNC: 3.8 G/DL (ref 3.5–5)
ALP SERPL-CCNC: 87 U/L (ref 34–104)
ALT SERPL W P-5'-P-CCNC: 14 U/L (ref 7–52)
ANION GAP SERPL CALCULATED.3IONS-SCNC: 9 MMOL/L (ref 4–13)
AST SERPL W P-5'-P-CCNC: 15 U/L (ref 13–39)
BACTERIA UR QL AUTO: ABNORMAL /HPF
BASOPHILS # BLD AUTO: 0.07 THOUSANDS/ÂΜL (ref 0–0.1)
BASOPHILS NFR BLD AUTO: 1 % (ref 0–1)
BILIRUB SERPL-MCNC: 0.56 MG/DL (ref 0.2–1)
BILIRUB UR QL STRIP: NEGATIVE
BUN SERPL-MCNC: 26 MG/DL (ref 5–25)
CALCIUM SERPL-MCNC: 9.6 MG/DL (ref 8.4–10.2)
CHLORIDE SERPL-SCNC: 113 MMOL/L (ref 96–108)
CLARITY UR: CLEAR
CO2 SERPL-SCNC: 22 MMOL/L (ref 21–32)
COLOR UR: ABNORMAL
CREAT SERPL-MCNC: 1.02 MG/DL (ref 0.6–1.3)
EOSINOPHIL # BLD AUTO: 0.23 THOUSAND/ÂΜL (ref 0–0.61)
EOSINOPHIL NFR BLD AUTO: 3 % (ref 0–6)
ERYTHROCYTE [DISTWIDTH] IN BLOOD BY AUTOMATED COUNT: 13 % (ref 11.6–15.1)
GFR SERPL CREATININE-BSD FRML MDRD: 53 ML/MIN/1.73SQ M
GLUCOSE P FAST SERPL-MCNC: 87 MG/DL (ref 65–99)
GLUCOSE UR STRIP-MCNC: NEGATIVE MG/DL
HCT VFR BLD AUTO: 40.8 % (ref 34.8–46.1)
HGB BLD-MCNC: 13.2 G/DL (ref 11.5–15.4)
HGB UR QL STRIP.AUTO: NEGATIVE
IMM GRANULOCYTES # BLD AUTO: 0.03 THOUSAND/UL (ref 0–0.2)
IMM GRANULOCYTES NFR BLD AUTO: 0 % (ref 0–2)
KETONES UR STRIP-MCNC: NEGATIVE MG/DL
LEUKOCYTE ESTERASE UR QL STRIP: ABNORMAL
LYMPHOCYTES # BLD AUTO: 2.72 THOUSANDS/ÂΜL (ref 0.6–4.47)
LYMPHOCYTES NFR BLD AUTO: 37 % (ref 14–44)
MAGNESIUM SERPL-MCNC: 2.4 MG/DL (ref 1.9–2.7)
MCH RBC QN AUTO: 32.3 PG (ref 26.8–34.3)
MCHC RBC AUTO-ENTMCNC: 32.4 G/DL (ref 31.4–37.4)
MCV RBC AUTO: 100 FL (ref 82–98)
MONOCYTES # BLD AUTO: 0.6 THOUSAND/ÂΜL (ref 0.17–1.22)
MONOCYTES NFR BLD AUTO: 8 % (ref 4–12)
NEUTROPHILS # BLD AUTO: 3.69 THOUSANDS/ÂΜL (ref 1.85–7.62)
NEUTS SEG NFR BLD AUTO: 51 % (ref 43–75)
NITRITE UR QL STRIP: NEGATIVE
NON-SQ EPI CELLS URNS QL MICRO: ABNORMAL /HPF
NRBC BLD AUTO-RTO: 0 /100 WBCS
PH UR STRIP.AUTO: 6 [PH]
PLATELET # BLD AUTO: 280 THOUSANDS/UL (ref 149–390)
PMV BLD AUTO: 9.8 FL (ref 8.9–12.7)
POTASSIUM SERPL-SCNC: 4.1 MMOL/L (ref 3.5–5.3)
PROT SERPL-MCNC: 6.6 G/DL (ref 6.4–8.4)
PROT UR STRIP-MCNC: ABNORMAL MG/DL
RBC # BLD AUTO: 4.09 MILLION/UL (ref 3.81–5.12)
RBC #/AREA URNS AUTO: ABNORMAL /HPF
SODIUM SERPL-SCNC: 144 MMOL/L (ref 135–147)
SP GR UR STRIP.AUTO: 1.02 (ref 1–1.03)
TSH SERPL DL<=0.05 MIU/L-ACNC: 1.29 UIU/ML (ref 0.45–4.5)
UROBILINOGEN UR STRIP-ACNC: <2 MG/DL
WBC # BLD AUTO: 7.34 THOUSAND/UL (ref 4.31–10.16)
WBC #/AREA URNS AUTO: ABNORMAL /HPF

## 2024-08-26 PROCEDURE — 36415 COLL VENOUS BLD VENIPUNCTURE: CPT

## 2024-08-26 PROCEDURE — 80053 COMPREHEN METABOLIC PANEL: CPT

## 2024-08-26 PROCEDURE — 85025 COMPLETE CBC W/AUTO DIFF WBC: CPT

## 2024-08-26 PROCEDURE — 93005 ELECTROCARDIOGRAM TRACING: CPT

## 2024-08-26 PROCEDURE — 83735 ASSAY OF MAGNESIUM: CPT

## 2024-08-26 PROCEDURE — 84443 ASSAY THYROID STIM HORMONE: CPT

## 2024-08-26 PROCEDURE — 81001 URINALYSIS AUTO W/SCOPE: CPT

## 2024-08-27 ENCOUNTER — CLINICAL SUPPORT (OUTPATIENT)
Dept: FAMILY MEDICINE CLINIC | Facility: CLINIC | Age: 79
End: 2024-08-27
Payer: COMMERCIAL

## 2024-08-27 ENCOUNTER — HOSPITAL ENCOUNTER (OUTPATIENT)
Dept: RADIOLOGY | Facility: HOSPITAL | Age: 79
Discharge: HOME | End: 2024-08-27
Payer: COMMERCIAL

## 2024-08-27 ENCOUNTER — TELEPHONE (OUTPATIENT)
Dept: FAMILY MEDICINE CLINIC | Facility: CLINIC | Age: 79
End: 2024-08-27

## 2024-08-27 DIAGNOSIS — R31.9 HEMATURIA, UNSPECIFIED TYPE: Primary | ICD-10-CM

## 2024-08-27 DIAGNOSIS — E21.3 HYPERPARATHYROIDISM (HCC): ICD-10-CM

## 2024-08-27 PROCEDURE — 81002 URINALYSIS NONAUTO W/O SCOPE: CPT

## 2024-08-27 PROCEDURE — 71046 X-RAY EXAM CHEST 2 VIEWS: CPT

## 2024-08-27 NOTE — TELEPHONE ENCOUNTER
----- Message from Evangelist Shankar MD sent at 8/27/2024  7:26 AM EDT -----  Repeat clean catch UA  ----- Message -----  From: Lab, Background User  Sent: 8/26/2024   3:00 PM EDT  To: Evangelist Shankar MD

## 2024-09-04 LAB
ATRIAL RATE: 66 BPM
P AXIS: 44 DEGREES
PR INTERVAL: 160 MS
QRS AXIS: -6 DEGREES
QRSD INTERVAL: 82 MS
QT INTERVAL: 402 MS
QTC INTERVAL: 421 MS
T WAVE AXIS: 38 DEGREES
VENTRICULAR RATE: 66 BPM

## 2024-09-04 PROCEDURE — 93010 ELECTROCARDIOGRAM REPORT: CPT | Performed by: INTERNAL MEDICINE

## 2024-09-06 ENCOUNTER — LAB REQUISITION (OUTPATIENT)
Dept: LAB | Facility: HOSPITAL | Age: 79
End: 2024-09-06
Payer: COMMERCIAL

## 2024-09-06 DIAGNOSIS — E04.1 NONTOXIC SINGLE THYROID NODULE: ICD-10-CM

## 2024-09-06 DIAGNOSIS — E21.3 HYPERPARATHYROIDISM, UNSPECIFIED (HCC): ICD-10-CM

## 2024-09-06 PROCEDURE — 88321 CONSLTJ&REPRT SLD PREP ELSWR: CPT | Performed by: STUDENT IN AN ORGANIZED HEALTH CARE EDUCATION/TRAINING PROGRAM

## 2024-09-12 DIAGNOSIS — R79.89 LOW VITAMIN B12 LEVEL: ICD-10-CM

## 2024-09-12 RX ORDER — LANOLIN ALCOHOL/MO/W.PET/CERES
1000 CREAM (GRAM) TOPICAL DAILY
Qty: 90 TABLET | Refills: 0 | Status: SHIPPED | OUTPATIENT
Start: 2024-09-12

## 2024-09-13 ENCOUNTER — OFFICE VISIT (OUTPATIENT)
Dept: SURGICAL ONCOLOGY | Facility: CLINIC | Age: 79
End: 2024-09-13
Payer: COMMERCIAL

## 2024-09-13 VITALS
WEIGHT: 152 LBS | HEIGHT: 57 IN | HEART RATE: 73 BPM | OXYGEN SATURATION: 97 % | BODY MASS INDEX: 32.79 KG/M2 | SYSTOLIC BLOOD PRESSURE: 115 MMHG | DIASTOLIC BLOOD PRESSURE: 78 MMHG

## 2024-09-13 DIAGNOSIS — E21.3 HYPERPARATHYROIDISM (HCC): Primary | ICD-10-CM

## 2024-09-13 DIAGNOSIS — Z01.818 PRE-OP EXAMINATION: ICD-10-CM

## 2024-09-13 PROCEDURE — 99215 OFFICE O/P EST HI 40 MIN: CPT | Performed by: SURGERY

## 2024-09-13 NOTE — PROGRESS NOTES
Surgical Oncology Follow Up  [address] [facility]  [facility]  [address] [facility] [facility] [place] [address]    Rachelle GARDNER Toddmumtazching  1945  [id number]      Chief Complaint   Patient presents with   • Follow-up     H&P - THYROID        Assessment & Plan:   This is a 79-year-old female with hypercalcemia and hyperparathyroidism localization studies demonstrated left upper and right inferior and large parathyroid glands.  She is here after her blood works which I reviewed.  She has permanent upper tooth implants from 50 years.  I have instructed her to bring it to anesthesiologist attention at the time of surgery she understand and agreed to do so.  Again we discussed the benefit procedure alternative and possible complications such as bleeding infection injury to surrounding structures hoarseness permanent injury to recurrent laryngeal nerve and its long-term consequences as well as permanent hypocalcemia.  Her surgery has been scheduled for October 1 and will proceed as planned.  Cancer History:     Oncology History    No history exists.         Interval History:   Follow-up with hypercalcemia and hyperparathyroidism    Review of Systems:   Review of Systems   Constitutional:  Negative for chills and fever.   HENT:  Negative for ear pain and sore throat.    Eyes:  Negative for pain and visual disturbance.   Respiratory:  Negative for cough and shortness of breath.    Cardiovascular:  Negative for chest pain and palpitations.   Gastrointestinal:  Negative for abdominal pain and vomiting.   Genitourinary:  Negative for dysuria and hematuria.   Musculoskeletal:  Negative for arthralgias and back pain.   Skin:  Negative for color change and rash.   Neurological:  Negative for seizures and syncope.   All other systems reviewed and are negative.    Past Medical History     Patient Active Problem List   Diagnosis   • Benign essential hypertension   • Vitamin D insufficiency   •  Dyslipidemia   • Other osteoporosis without current pathological fracture   • Seasonal allergies   • Varicose veins of left lower extremity with pain   • Bilateral lower extremity edema   • S/P MVR (mitral valve repair)   • Mitral valve prolapse   • Chronic heart failure with preserved ejection fraction (HCC)   • Age-related osteoporosis without current pathological fracture   • Cavovarus deformity of foot, acquired, left   • Pain of left hip   • Chronic left-sided low back pain without sciatica   • Hyperparathyroidism (HCC)     Past Medical History:   Diagnosis Date   • Arthritis    • Cellulitis of face 10/16/2019    Bilateral nares   • Family history of colon cancer in mother    • Functional murmur    • Hypercalcemia 2016   • Hypertension     Dx'd in , controlled    • Osteoporosis    • Screening for breast cancer 2018   • SOB (shortness of breath) 2020   • Viral upper respiratory tract infection 10/30/2018     Past Surgical History:   Procedure Laterality Date   • CARDIAC SURGERY     •  SECTION      x2,  &    • FL GUIDED NEEDLE PLAC BX/ASP/INJ  2024   • FLEXIBLE SIGMOIDOSCOPY     • MITRAL VALVE REPAIR     • MA COLONOSCOPY FLX DX W/COLLJ SPEC WHEN PFRMD N/A 10/10/2016    Procedure: COLONOSCOPY;  Surgeon: Patsy Escobedo MD;  [facility];  Service: Gastroenterology   • TOE SURGERY Bilateral     hammer toe correction surgery   • TONSILLECTOMY      with Adenoidectomy     Family History   Problem Relation Age of Onset   • Colon cancer Mother 73   • Cancer Mother    • Aneurysm Father    • No Known Problems Sister    • No Known Problems Paternal Aunt    • No Known Problems Paternal Aunt    • No Known Problems Paternal Aunt    • No Known Problems Paternal Aunt    • No Known Problems Maternal Grandmother    • No Known Problems Maternal Grandfather    • No Known Problems Paternal Grandmother    • No Known Problems Paternal Grandfather    • No Known Problems Daughter    • No  Known Problems Son    • No Known Problems Son      Social History     Socioeconomic History   • Marital status: /Civil Union     Spouse name: Not on file   • Number of children: Not on file   • Years of education: Not on file   • Highest education level: Not on file   Occupational History   • Not on file   Tobacco Use   • Smoking status: Never     Passive exposure: Past   • Smokeless tobacco: Never   Vaping Use   • Vaping status: Never Used   Substance and Sexual Activity   • Alcohol use: Not Currently     Comment: Rarely    • Drug use: No   • Sexual activity: Not Currently     Partners: Male     Birth control/protection: None   Other Topics Concern   • Not on file   Social History Narrative    Caffeine use via coffee, 1 serv/day    Exercises regularly, walking      Social Determinants of Health     Financial Resource Strain: Low Risk  (5/11/2023)    Overall Financial Resource Strain (CARDIA)    • Difficulty of Paying Living Expenses: Not hard at all   Food Insecurity: No Food Insecurity (8/1/2024)    Hunger Vital Sign    • Worried About Running Out of Food in the Last Year: Never true    • Ran Out of Food in the Last Year: Never true   Transportation Needs: No Transportation Needs (8/1/2024)    PRAPARE - Transportation    • Lack of Transportation (Medical): No    • Lack of Transportation (Non-Medical): No   Physical Activity: Not on file   Stress: Not on file   Social Connections: Not on file   Intimate Partner Violence: Not on file   Housing Stability: Low Risk  (8/1/2024)    Housing Stability Vital Sign    • Unable to Pay for Housing in the Last Year: No    • Number of Times Moved in the Last Year: 1    • Homeless in the Last Year: No       Current Outpatient Medications:   •  acetaminophen-codeine (TYLENOL with CODEINE #3) 300-30 MG per tablet, Take 1 tablet by mouth every 6 (six) hours as needed for severe pain, Disp: 20 tablet, Rfl: 0  •  aspirin (ECOTRIN LOW STRENGTH) 81 mg EC tablet, Take 1 tablet (81  mg total) by mouth daily, Disp: 90 tablet, Rfl: 3  •  atorvastatin (LIPITOR) 20 mg tablet, Take 1 tablet (20 mg total) by mouth daily, Disp: 100 tablet, Rfl: 3  •  calcium carbonate (TUMS) 500 mg chewable tablet, Chew 1 tablet (500 mg total) 3 (three) times a day, Disp: 90 tablet, Rfl: 0  •  Calcium Carbonate-Vit D-Min (Caltrate 600+D Plus Minerals) 600-800 MG-UNIT TABS, Take 1 tablet by mouth 2 (two) times a day with meals, Disp: 200 tablet, Rfl: 6  •  chlorhexidine (PERIDEX) 0.12 % solution, Apply 15 mL to the mouth or throat 2 (two) times a day, Disp: , Rfl:   •  Coenzyme Q10 10 MG capsule, Take 1 capsule (10 mg total) by mouth daily, Disp: 90 capsule, Rfl: 3  •  denosumab (PROLIA) 60 mg/mL, Inject 1 mL (60 mg total) under the skin once for 1 dose, Disp: 1 mL, Rfl: 1  •  Diclofenac Sodium (VOLTAREN) 1 %, Apply 2 g topically 4 (four) times a day, Disp: 200 g, Rfl: 3  •  losartan (COZAAR) 50 mg tablet, Take 1 tablet (50 mg total) by mouth daily In the Morning.., Disp: 100 tablet, Rfl: 3  •  metoprolol succinate (TOPROL-XL) 50 mg 24 hr tablet, Take 1 tablet (50 mg total) by mouth daily In the evening daily, Disp: 90 tablet, Rfl: 3  •  torsemide (DEMADEX) 10 mg tablet, Take 1 tablet (10 mg total) by mouth daily, Disp: 90 tablet, Rfl: 3  •  vitamin B-12 (VITAMIN B-12) 1,000 mcg tablet, Take 1 tablet (1,000 mcg total) by mouth daily, Disp: 90 tablet, Rfl: 0  Allergies   Allergen Reactions   • Poison Ivy Extract Rash       Physical Exam:     Vitals:    09/13/24 0941   BP: 115/78   Pulse: 73   SpO2: 97%     Physical Exam  Constitutional:       Appearance: Normal appearance.   HENT:      Head: Normocephalic and atraumatic.      Nose: Nose normal.      Mouth/Throat:      Mouth: Mucous membranes are moist.   Eyes:      Pupils: Pupils are equal, round, and reactive to light.   Neck:      Comments: Neck is supple no palpable mass or masses.  Trachea is midline no JVD.  Cardiovascular:      Rate and Rhythm: Normal rate.       Pulses: Normal pulses.      Heart sounds: Normal heart sounds.   Pulmonary:      Effort: Pulmonary effort is normal.      Breath sounds: Normal breath sounds.   Abdominal:      General: Bowel sounds are normal.      Palpations: Abdomen is soft.   Musculoskeletal:         General: Normal range of motion.      Cervical back: Normal range of motion and neck supple.   Skin:     General: Skin is warm.   Neurological:      General: No focal deficit present.      Mental Status: She is alert and oriented to person, place, and time.   Psychiatric:         Mood and Affect: Mood normal.         Behavior: Behavior normal.         Thought Content: Thought content normal.         Judgment: Judgment normal.       Results & Discussion:   CTA  NECK  WITHOUT AND WITH CONTRAST FROM NEPTALI TO SKULL BASE     INDICATION: Hyperparathyroidism. Parathyroid hormone is 121.6 on laboratory examination dated 7/26/2024.     COMPARISON: NM parathyroid with CT LOC 5/23/2024. Ultrasound thyroid 8/8/2023. NM parathyroid scan with SPECT 5/19/2023.     TECHNIQUE:  Both noncontrast, contrast, and post contrast delayed images were performed according to standard parathyroid protocol (4D technique) Coronal and sagittal reconstructions were performed. 3D reconstructions were performed on an independant   workstation, and are supplied for review.  This examination, like all CT scans performed in the [facility], was performed utilizing techniques to minimize radiation dose exposure, including the use of iterative reconstruction and   automated exposure control.  Rad dose 1395 mGy-cm     IV Contrast:  100 mL of iohexol (OMNIPAQUE)     FINDINGS:     SERIAL NONCONTRAST, POST CONTRAST AND DELAYS: 0.8 cm enhancing lesion posteriorly adjacent to right lower thyroid lobe and extending inferiorly in close proximity to the posterior border of brachiocephalic artery (3:124) - characteristics of this lesion   are noncontrast hypodense, arterially  enhancing, and slight venous washout with adjacent polar vessel.     1.1 cm enhancing lesion in left retropharyngeal region just posterior to the lower cervical esophagus at at C5-C6 level (3:160) - characteristics of this lesion are noncontrast hypodense, arterially enhancing, and slight venous washout with adjacent   polar vessel.     VASCULAR STRUCTURES:  Normal enhancement of the cervical vasculature. Minor calcified atherosclerotic disease of aortic arch. Slight retropharyngeal course of left carotid artery, normal variant.     VISUALIZED BRAIN PARENCHYMA:  Normal.     VISUALIZED PARANASAL SINUSES: Clear visualized sinuses. 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     NASAL CAVITY AND NASOPHARYNX: Normal.     SUPRAHYOID NECK: Dental amalgam with extensive beam hardening artifact limits evaluation of anterior oral cavity for which direct visualization is recommended; otherwise, normal visualized oral cavity within limitations. Normal oropharynx and   parapharyngeal spaces. Please see above discussion regarding left retropharyngeal space.     INFRAHYOID NECK: Larynx and hypopharynx are normal. Glottic and subglottic airway are normal.     THYROID GLAND: 1.3 cm heterogeneously enhancing nodule in right thyroid lobe (4:81). 1.1 cm heterogeneously enhancing nodule left thyroid lobe (4:64). Incidental discovery of one or more thyroid nodule(s) measuring less than 1.5 cm and without suspicious   features is noted in this patient who is above 35 years old; according to guidelines published in the February 2015 white paper on incidental thyroid nodules in the Journal of the American College of Radiology (JACR), no further evaluation is   recommended.     LYMPH NODES:  No pathologic or enlarged adenopathy.     MEDIASTINUM:  Unremarkable.     BONY STRUCTURES: Poststernotomy. Grade 1 anterolisthesis C3-C4, C4-C5, and C7-T1. Multilevel degenerative changes of cervical spine, severe at C5-C6.     LUNG APICES: 0.4 cm  left upper lobe mixed groundglass and solid pulmonary nodule (2:24).     NASCET criteria was used to determine the degree of internal carotid artery diameter stenosis.     IMPRESSION:     0.8 cm candidate parathyroid adenoma adjacent to the right lower thyroid lobe and extending inferiorly in close proximity to the posterior border of the brachiocephalic artery. This corresponds to uptake on parathyroid scan 5/23/2024     1.1 cm candidate parathyroid adenoma in left retropharyngeal region just posterior to the lower cervical esophagus at C5-C6 level. This corresponds to uptake on parathyroid scan 5/23/2024.     0.4 cm left upper lobe mixed groundglass and solid pulmonary nodule. Based on current Fleischner Society 2017 Guidelines on incidental pulmonary nodule, follow-up noncontrast CT is recommended at 3-6 months from the initial examination to confirm   persistence; if stable at that time (solid component remains <6mm), additional follow-up CT is recommended annually until 5 years of stability is demonstrated.     Suboptimal evaluation of oral cavity secondary to extensive beam hardening streak artifact. Recommend direct visualization.     I did review CT parathyroid films.  I did discussed in detail nature ofhypercalcemia hyperparathyroidism benefits of surgery she understands and  agrees . All patient questions were answered.       Advance Care Planning/Advance Directives:  I Luis Miguel Costa MD discussed the disease status with Rachelle Narayanan  today 09/13/24  treatment plans and follow-up with the patient.

## 2024-09-13 NOTE — H&P (VIEW-ONLY)
Surgical Oncology Follow Up  [address] [facility]  [facility]  [address] [facility] [place] [address]    Rachelle GARDNER Toddmumtazching  1945  [id number]      Chief Complaint   Patient presents with   • Follow-up     H&P - THYROID        Assessment & Plan:   This is a 79-year-old female with hypercalcemia and hyperparathyroidism localization studies demonstrated left upper and right inferior and large parathyroid glands.  She is here after her blood works which I reviewed.  She has permanent upper tooth implants from 50 years.  I have instructed her to bring it to anesthesiologist attention at the time of surgery she understand and agreed to do so.  Again we discussed the benefit procedure alternative and possible complications such as bleeding infection injury to surrounding structures hoarseness permanent injury to recurrent laryngeal nerve and its long-term consequences as well as permanent hypocalcemia.  Her surgery has been scheduled for October 1 and will proceed as planned.  Cancer History:     Oncology History    No history exists.         Interval History:   Follow-up with hypercalcemia and hyperparathyroidism    Review of Systems:   Review of Systems   Constitutional:  Negative for chills and fever.   HENT:  Negative for ear pain and sore throat.    Eyes:  Negative for pain and visual disturbance.   Respiratory:  Negative for cough and shortness of breath.    Cardiovascular:  Negative for chest pain and palpitations.   Gastrointestinal:  Negative for abdominal pain and vomiting.   Genitourinary:  Negative for dysuria and hematuria.   Musculoskeletal:  Negative for arthralgias and back pain.   Skin:  Negative for color change and rash.   Neurological:  Negative for seizures and syncope.   All other systems reviewed and are negative.    Past Medical History     Patient Active Problem List   Diagnosis   • Benign essential hypertension   • Vitamin D insufficiency   •  Dyslipidemia   • Other osteoporosis without current pathological fracture   • Seasonal allergies   • Varicose veins of left lower extremity with pain   • Bilateral lower extremity edema   • S/P MVR (mitral valve repair)   • Mitral valve prolapse   • Chronic heart failure with preserved ejection fraction (HCC)   • Age-related osteoporosis without current pathological fracture   • Cavovarus deformity of foot, acquired, left   • Pain of left hip   • Chronic left-sided low back pain without sciatica   • Hyperparathyroidism (HCC)     Past Medical History:   Diagnosis Date   • Arthritis    • Cellulitis of face 10/16/2019    Bilateral nares   • Family history of colon cancer in mother    • Functional murmur    • Hypercalcemia 2016   • Hypertension     Dx'd in , controlled    • Osteoporosis    • Screening for breast cancer 2018   • SOB (shortness of breath) 2020   • Viral upper respiratory tract infection 10/30/2018     Past Surgical History:   Procedure Laterality Date   • CARDIAC SURGERY     •  SECTION      x2,  &    • FL GUIDED NEEDLE PLAC BX/ASP/INJ  2024   • FLEXIBLE SIGMOIDOSCOPY     • MITRAL VALVE REPAIR     • WI COLONOSCOPY FLX DX W/COLLJ SPEC WHEN PFRMD N/A 10/10/2016    Procedure: COLONOSCOPY;  Surgeon: Patsy Escobedo MD;  [facility];  Service: Gastroenterology   • TOE SURGERY Bilateral     hammer toe correction surgery   • TONSILLECTOMY      with Adenoidectomy     Family History   Problem Relation Age of Onset   • Colon cancer Mother 73   • Cancer Mother    • Aneurysm Father    • No Known Problems Sister    • No Known Problems Paternal Aunt    • No Known Problems Paternal Aunt    • No Known Problems Paternal Aunt    • No Known Problems Paternal Aunt    • No Known Problems Maternal Grandmother    • No Known Problems Maternal Grandfather    • No Known Problems Paternal Grandmother    • No Known Problems Paternal Grandfather    • No Known Problems Daughter    • No  Known Problems Son    • No Known Problems Son      Social History     Socioeconomic History   • Marital status: /Civil Union     Spouse name: Not on file   • Number of children: Not on file   • Years of education: Not on file   • Highest education level: Not on file   Occupational History   • Not on file   Tobacco Use   • Smoking status: Never     Passive exposure: Past   • Smokeless tobacco: Never   Vaping Use   • Vaping status: Never Used   Substance and Sexual Activity   • Alcohol use: Not Currently     Comment: Rarely    • Drug use: No   • Sexual activity: Not Currently     Partners: Male     Birth control/protection: None   Other Topics Concern   • Not on file   Social History Narrative    Caffeine use via coffee, 1 serv/day    Exercises regularly, walking      Social Determinants of Health     Financial Resource Strain: Low Risk  (5/11/2023)    Overall Financial Resource Strain (CARDIA)    • Difficulty of Paying Living Expenses: Not hard at all   Food Insecurity: No Food Insecurity (8/1/2024)    Hunger Vital Sign    • Worried About Running Out of Food in the Last Year: Never true    • Ran Out of Food in the Last Year: Never true   Transportation Needs: No Transportation Needs (8/1/2024)    PRAPARE - Transportation    • Lack of Transportation (Medical): No    • Lack of Transportation (Non-Medical): No   Physical Activity: Not on file   Stress: Not on file   Social Connections: Not on file   Intimate Partner Violence: Not on file   Housing Stability: Low Risk  (8/1/2024)    Housing Stability Vital Sign    • Unable to Pay for Housing in the Last Year: No    • Number of Times Moved in the Last Year: 1    • Homeless in the Last Year: No       Current Outpatient Medications:   •  acetaminophen-codeine (TYLENOL with CODEINE #3) 300-30 MG per tablet, Take 1 tablet by mouth every 6 (six) hours as needed for severe pain, Disp: 20 tablet, Rfl: 0  •  aspirin (ECOTRIN LOW STRENGTH) 81 mg EC tablet, Take 1 tablet (81  mg total) by mouth daily, Disp: 90 tablet, Rfl: 3  •  atorvastatin (LIPITOR) 20 mg tablet, Take 1 tablet (20 mg total) by mouth daily, Disp: 100 tablet, Rfl: 3  •  calcium carbonate (TUMS) 500 mg chewable tablet, Chew 1 tablet (500 mg total) 3 (three) times a day, Disp: 90 tablet, Rfl: 0  •  Calcium Carbonate-Vit D-Min (Caltrate 600+D Plus Minerals) 600-800 MG-UNIT TABS, Take 1 tablet by mouth 2 (two) times a day with meals, Disp: 200 tablet, Rfl: 6  •  chlorhexidine (PERIDEX) 0.12 % solution, Apply 15 mL to the mouth or throat 2 (two) times a day, Disp: , Rfl:   •  Coenzyme Q10 10 MG capsule, Take 1 capsule (10 mg total) by mouth daily, Disp: 90 capsule, Rfl: 3  •  denosumab (PROLIA) 60 mg/mL, Inject 1 mL (60 mg total) under the skin once for 1 dose, Disp: 1 mL, Rfl: 1  •  Diclofenac Sodium (VOLTAREN) 1 %, Apply 2 g topically 4 (four) times a day, Disp: 200 g, Rfl: 3  •  losartan (COZAAR) 50 mg tablet, Take 1 tablet (50 mg total) by mouth daily In the Morning.., Disp: 100 tablet, Rfl: 3  •  metoprolol succinate (TOPROL-XL) 50 mg 24 hr tablet, Take 1 tablet (50 mg total) by mouth daily In the evening daily, Disp: 90 tablet, Rfl: 3  •  torsemide (DEMADEX) 10 mg tablet, Take 1 tablet (10 mg total) by mouth daily, Disp: 90 tablet, Rfl: 3  •  vitamin B-12 (VITAMIN B-12) 1,000 mcg tablet, Take 1 tablet (1,000 mcg total) by mouth daily, Disp: 90 tablet, Rfl: 0  Allergies   Allergen Reactions   • Poison Ivy Extract Rash       Physical Exam:     Vitals:    09/13/24 0941   BP: 115/78   Pulse: 73   SpO2: 97%     Physical Exam  Constitutional:       Appearance: Normal appearance.   HENT:      Head: Normocephalic and atraumatic.      Nose: Nose normal.      Mouth/Throat:      Mouth: Mucous membranes are moist.   Eyes:      Pupils: Pupils are equal, round, and reactive to light.   Neck:      Comments: Neck is supple no palpable mass or masses.  Trachea is midline no JVD.  Cardiovascular:      Rate and Rhythm: Normal rate.       Pulses: Normal pulses.      Heart sounds: Normal heart sounds.   Pulmonary:      Effort: Pulmonary effort is normal.      Breath sounds: Normal breath sounds.   Abdominal:      General: Bowel sounds are normal.      Palpations: Abdomen is soft.   Musculoskeletal:         General: Normal range of motion.      Cervical back: Normal range of motion and neck supple.   Skin:     General: Skin is warm.   Neurological:      General: No focal deficit present.      Mental Status: She is alert and oriented to person, place, and time.   Psychiatric:         Mood and Affect: Mood normal.         Behavior: Behavior normal.         Thought Content: Thought content normal.         Judgment: Judgment normal.       Results & Discussion:   CTA  NECK  WITHOUT AND WITH CONTRAST FROM NEPTALI TO SKULL BASE     INDICATION: Hyperparathyroidism. Parathyroid hormone is 121.6 on laboratory examination dated 7/26/2024.     COMPARISON: NM parathyroid with CT LOC 5/23/2024. Ultrasound thyroid 8/8/2023. NM parathyroid scan with SPECT 5/19/2023.     TECHNIQUE:  Both noncontrast, contrast, and post contrast delayed images were performed according to standard parathyroid protocol (4D technique) Coronal and sagittal reconstructions were performed. 3D reconstructions were performed on an independant   workstation, and are supplied for review.  This examination, like all CT scans performed in the [facility] include [facility] beds at the [facility], was performed utilizing techniques to minimize radiation dose exposure, including the use of iterative reconstruction and   automated exposure control.  Rad dose 1395 mGy-cm     IV Contrast:  100 mL of iohexol (OMNIPAQUE)     FINDINGS:     SERIAL NONCONTRAST, POST CONTRAST AND DELAYS: 0.8 cm enhancing lesion posteriorly adjacent to right lower thyroid lobe and extending inferiorly in close proximity to the posterior border of brachiocephalic artery (3:124) - characteristics of this lesion   are noncontrast hypodense, arterially  enhancing, and slight venous washout with adjacent polar vessel.     1.1 cm enhancing lesion in left retropharyngeal region just posterior to the lower cervical esophagus at at C5-C6 level (3:160) - characteristics of this lesion are noncontrast hypodense, arterially enhancing, and slight venous washout with adjacent   polar vessel.     VASCULAR STRUCTURES:  Normal enhancement of the cervical vasculature. Minor calcified atherosclerotic disease of aortic arch. Slight retropharyngeal course of left carotid artery, normal variant.     VISUALIZED BRAIN PARENCHYMA:  Normal.     VISUALIZED PARANASAL SINUSES: Clear visualized sinuses. 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     NASAL CAVITY AND NASOPHARYNX: Normal.     SUPRAHYOID NECK: Dental amalgam with extensive beam hardening artifact limits evaluation of anterior oral cavity for which direct visualization is recommended; otherwise, normal visualized oral cavity within limitations. Normal oropharynx and   parapharyngeal spaces. Please see above discussion regarding left retropharyngeal space.     INFRAHYOID NECK: Larynx and hypopharynx are normal. Glottic and subglottic airway are normal.     THYROID GLAND: 1.3 cm heterogeneously enhancing nodule in right thyroid lobe (4:81). 1.1 cm heterogeneously enhancing nodule left thyroid lobe (4:64). Incidental discovery of one or more thyroid nodule(s) measuring less than 1.5 cm and without suspicious   features is noted in this patient who is above 35 years old; according to guidelines published in the February 2015 white paper on incidental thyroid nodules in the Journal of the American College of Radiology (JACR), no further evaluation is   recommended.     LYMPH NODES:  No pathologic or enlarged adenopathy.     MEDIASTINUM:  Unremarkable.     BONY STRUCTURES: Poststernotomy. Grade 1 anterolisthesis C3-C4, C4-C5, and C7-T1. Multilevel degenerative changes of cervical spine, severe at C5-C6.     LUNG APICES: 0.4 cm  left upper lobe mixed groundglass and solid pulmonary nodule (2:24).     NASCET criteria was used to determine the degree of internal carotid artery diameter stenosis.     IMPRESSION:     0.8 cm candidate parathyroid adenoma adjacent to the right lower thyroid lobe and extending inferiorly in close proximity to the posterior border of the brachiocephalic artery. This corresponds to uptake on parathyroid scan 5/23/2024     1.1 cm candidate parathyroid adenoma in left retropharyngeal region just posterior to the lower cervical esophagus at C5-C6 level. This corresponds to uptake on parathyroid scan 5/23/2024.     0.4 cm left upper lobe mixed groundglass and solid pulmonary nodule. Based on current Fleischner Society 2017 Guidelines on incidental pulmonary nodule, follow-up noncontrast CT is recommended at 3-6 months from the initial examination to confirm   persistence; if stable at that time (solid component remains <6mm), additional follow-up CT is recommended annually until 5 years of stability is demonstrated.     Suboptimal evaluation of oral cavity secondary to extensive beam hardening streak artifact. Recommend direct visualization.     I did review CT parathyroid films.  I did discussed in detail nature ofhypercalcemia hyperparathyroidism benefits of surgery she understands and  agrees . All patient questions were answered.       Advance Care Planning/Advance Directives:  I Luis Miguel Costa MD discussed the disease status with Rcahelle Narayanan  today 09/13/24  treatment plans and follow-up with the patient.

## 2024-09-18 ENCOUNTER — ANESTHESIA EVENT (OUTPATIENT)
Dept: PERIOP | Facility: HOSPITAL | Age: 79
End: 2024-09-18
Payer: COMMERCIAL

## 2024-09-20 NOTE — PRE-PROCEDURE INSTRUCTIONS
Pre-Surgery Instructions:   Medication Instructions    aspirin (ECOTRIN LOW STRENGTH) 81 mg EC tablet Instructions provided by MD- pt states she was advised by surgeon ok to continue    atorvastatin (LIPITOR) 20 mg tablet Continue    Calcium Carbonate-Vit D-Min (Caltrate 600+D Plus Minerals) 600-800 MG-UNIT TABS Pt already started holding this- last dose was 9/19    Coenzyme Q10 10 MG capsule Pt already started holding this- last dose was 9/19    Diclofenac Sodium (VOLTAREN) 1 % Stop taking 1 day prior to surgery.    losartan (COZAAR) 50 mg tablet Hold day of surgery.    metoprolol succinate (TOPROL-XL) 50 mg 24 hr tablet Take day of surgery.    torsemide (DEMADEX) 10 mg tablet Hold day of surgery.    vitamin B-12 (VITAMIN B-12) 1,000 mcg tablet Pt already started holding this- last dose was 9/19   Medication instructions for day surgery reviewed. Please use only a sip of water to take your instructed medications. Avoid all over the counter vitamins, supplements and NSAIDS for one week prior to surgery per anesthesia guidelines. Tylenol is ok to take as needed.     You will receive a call one business day prior to surgery with an arrival time and hospital directions. If your surgery is scheduled on a Monday, the hospital will be calling you on the Friday prior to your surgery. If you have not heard from anyone by 8pm, please call the hospital supervisor through the hospital  at [phone]. ([place] [phone] or [place] [phone]).    Do not eat or drink anything after midnight the night before your surgery, including candy, mints, lifesavers, or chewing gum. Do not drink alcohol 24hrs before your surgery. Try not to smoke at least 24hrs before your surgery.       Follow the pre surgery showering instructions as listed in the “My Surgical Experience Booklet” or otherwise provided by your surgeon's office. Do not use a blade to shave the surgical area 1 week before surgery. It is okay to use a clean  electric clippers up to 24 hours before surgery. Do not apply any lotions, creams, including makeup, cologne, deodorant, or perfumes after showering on the day of your surgery. Do not use dry shampoo, hair spray, hair gel, or any type of hair products.     No contact lenses, eye make-up, or artificial eyelashes. Remove nail polish, including gel polish, and any artificial, gel, or acrylic nails if possible. Remove all jewelry including rings and body piercing jewelry.     Wear causal clothing that is easy to take on and off. Consider your type of surgery.    Keep any valuables, jewelry, piercings at home. Please bring any specially ordered equipment (sling, braces) if indicated.    Arrange for a responsible person to drive you to and from the hospital on the day of your surgery. Please confirm the visitor policy for the day of your procedure when you receive your phone call with an arrival time.     Call the surgeon's office with any new illnesses, exposures, or additional questions prior to surgery.    Please reference your “My Surgical Experience Booklet” for additional information to prepare for your upcoming surgery.

## 2024-10-01 ENCOUNTER — HOSPITAL ENCOUNTER (OUTPATIENT)
Facility: HOSPITAL | Age: 79
Setting detail: OUTPATIENT SURGERY
Discharge: HOME | End: 2024-10-02
Attending: SURGERY | Admitting: SURGERY
Payer: COMMERCIAL

## 2024-10-01 ENCOUNTER — ANESTHESIA (OUTPATIENT)
Dept: PERIOP | Facility: HOSPITAL | Age: 79
End: 2024-10-01
Payer: COMMERCIAL

## 2024-10-01 DIAGNOSIS — Z98.890 H/O PARATHYROIDECTOMY: ICD-10-CM

## 2024-10-01 DIAGNOSIS — E21.3 HYPERPARATHYROIDISM (HCC): Primary | ICD-10-CM

## 2024-10-01 DIAGNOSIS — Z90.89 H/O PARATHYROIDECTOMY: ICD-10-CM

## 2024-10-01 PROBLEM — Z87.898 HISTORY OF ANESTHESIA COMPLICATIONS: Status: ACTIVE | Noted: 2024-10-01

## 2024-10-01 LAB
PLATELET # BLD AUTO: 239 THOUSANDS/UL (ref 149–390)
PMV BLD AUTO: 9.2 FL (ref 8.9–12.7)
PTH-INTACT P EXCISION SERPL-MCNC: 260.1 PG/ML (ref 12–88)
PTH-INTACT P EXCISION SERPL-MCNC: 264.1 PG/ML (ref 12–88)
PTH-INTACT P EXCISION SERPL-MCNC: 317.7 PG/ML (ref 12–88)
PTH-INTACT P EXCISION SERPL-MCNC: 386.4 PG/ML (ref 12–88)
PTH-INTACT P EXCISION SERPL-MCNC: 60.3 PG/ML (ref 12–88)
PTH-INTACT P EXCISION SERPL-MCNC: 82.6 PG/ML (ref 12–88)
PTH-INTACT SERPL-MCNC: 30.8 PG/ML (ref 12–88)

## 2024-10-01 PROCEDURE — 60500 EXPLORE PARATHYROID GLANDS: CPT

## 2024-10-01 PROCEDURE — 85049 AUTOMATED PLATELET COUNT: CPT

## 2024-10-01 PROCEDURE — 88331 PATH CONSLTJ SURG 1 BLK 1SPC: CPT | Performed by: PATHOLOGY

## 2024-10-01 PROCEDURE — 60500 EXPLORE PARATHYROID GLANDS: CPT | Performed by: SURGERY

## 2024-10-01 PROCEDURE — 88305 TISSUE EXAM BY PATHOLOGIST: CPT | Performed by: PATHOLOGY

## 2024-10-01 PROCEDURE — 83970 ASSAY OF PARATHORMONE: CPT | Performed by: SURGERY

## 2024-10-01 RX ORDER — MAGNESIUM SULFATE HEPTAHYDRATE 40 MG/ML
INJECTION, SOLUTION INTRAVENOUS AS NEEDED
Status: DISCONTINUED | OUTPATIENT
Start: 2024-10-01 | End: 2024-10-01

## 2024-10-01 RX ORDER — SUCCINYLCHOLINE/SOD CL,ISO/PF 100 MG/5ML
SYRINGE (ML) INTRAVENOUS AS NEEDED
Status: DISCONTINUED | OUTPATIENT
Start: 2024-10-01 | End: 2024-10-01

## 2024-10-01 RX ORDER — METOPROLOL SUCCINATE 50 MG/1
50 TABLET, EXTENDED RELEASE ORAL DAILY
Status: DISCONTINUED | OUTPATIENT
Start: 2024-10-02 | End: 2024-10-02 | Stop reason: HOSPADM

## 2024-10-01 RX ORDER — HYDROMORPHONE HCL IN WATER/PF 6 MG/30 ML
0.2 PATIENT CONTROLLED ANALGESIA SYRINGE INTRAVENOUS
Status: DISCONTINUED | OUTPATIENT
Start: 2024-10-01 | End: 2024-10-01 | Stop reason: HOSPADM

## 2024-10-01 RX ORDER — MIDAZOLAM HYDROCHLORIDE 2 MG/2ML
INJECTION, SOLUTION INTRAMUSCULAR; INTRAVENOUS AS NEEDED
Status: DISCONTINUED | OUTPATIENT
Start: 2024-10-01 | End: 2024-10-01

## 2024-10-01 RX ORDER — ATORVASTATIN CALCIUM 20 MG/1
20 TABLET, FILM COATED ORAL DAILY
Status: DISCONTINUED | OUTPATIENT
Start: 2024-10-02 | End: 2024-10-02 | Stop reason: HOSPADM

## 2024-10-01 RX ORDER — ONDANSETRON 2 MG/ML
4 INJECTION INTRAMUSCULAR; INTRAVENOUS ONCE AS NEEDED
Status: DISCONTINUED | OUTPATIENT
Start: 2024-10-01 | End: 2024-10-01 | Stop reason: HOSPADM

## 2024-10-01 RX ORDER — OXYCODONE HYDROCHLORIDE 10 MG/1
10 TABLET ORAL EVERY 4 HOURS PRN
Status: DISCONTINUED | OUTPATIENT
Start: 2024-10-01 | End: 2024-10-02 | Stop reason: HOSPADM

## 2024-10-01 RX ORDER — FENTANYL CITRATE/PF 50 MCG/ML
25 SYRINGE (ML) INJECTION
Status: DISCONTINUED | OUTPATIENT
Start: 2024-10-01 | End: 2024-10-01 | Stop reason: HOSPADM

## 2024-10-01 RX ORDER — DEXAMETHASONE SODIUM PHOSPHATE 10 MG/ML
INJECTION, SOLUTION INTRAMUSCULAR; INTRAVENOUS AS NEEDED
Status: DISCONTINUED | OUTPATIENT
Start: 2024-10-01 | End: 2024-10-01

## 2024-10-01 RX ORDER — ACETAMINOPHEN 325 MG/1
650 TABLET ORAL EVERY 8 HOURS SCHEDULED
Status: DISCONTINUED | OUTPATIENT
Start: 2024-10-01 | End: 2024-10-02 | Stop reason: HOSPADM

## 2024-10-01 RX ORDER — FENTANYL CITRATE 50 UG/ML
INJECTION, SOLUTION INTRAMUSCULAR; INTRAVENOUS AS NEEDED
Status: DISCONTINUED | OUTPATIENT
Start: 2024-10-01 | End: 2024-10-01

## 2024-10-01 RX ORDER — SODIUM CHLORIDE, SODIUM LACTATE, POTASSIUM CHLORIDE, CALCIUM CHLORIDE 600; 310; 30; 20 MG/100ML; MG/100ML; MG/100ML; MG/100ML
20 INJECTION, SOLUTION INTRAVENOUS CONTINUOUS
Status: DISCONTINUED | OUTPATIENT
Start: 2024-10-01 | End: 2024-10-02 | Stop reason: HOSPADM

## 2024-10-01 RX ORDER — HEPARIN SODIUM 5000 [USP'U]/ML
5000 INJECTION, SOLUTION INTRAVENOUS; SUBCUTANEOUS EVERY 8 HOURS SCHEDULED
Status: DISCONTINUED | OUTPATIENT
Start: 2024-10-01 | End: 2024-10-02 | Stop reason: HOSPADM

## 2024-10-01 RX ORDER — PROPOFOL 10 MG/ML
INJECTION, EMULSION INTRAVENOUS AS NEEDED
Status: DISCONTINUED | OUTPATIENT
Start: 2024-10-01 | End: 2024-10-01

## 2024-10-01 RX ORDER — EPHEDRINE SULFATE 50 MG/ML
INJECTION INTRAVENOUS AS NEEDED
Status: DISCONTINUED | OUTPATIENT
Start: 2024-10-01 | End: 2024-10-01

## 2024-10-01 RX ORDER — LOSARTAN POTASSIUM 50 MG/1
50 TABLET ORAL DAILY
Status: DISCONTINUED | OUTPATIENT
Start: 2024-10-02 | End: 2024-10-02 | Stop reason: HOSPADM

## 2024-10-01 RX ORDER — OXYCODONE HYDROCHLORIDE 5 MG/1
5 TABLET ORAL EVERY 4 HOURS PRN
Status: DISCONTINUED | OUTPATIENT
Start: 2024-10-01 | End: 2024-10-02 | Stop reason: HOSPADM

## 2024-10-01 RX ORDER — CALCIUM CARBONATE 500 MG/1
1000 TABLET, CHEWABLE ORAL 3 TIMES DAILY
Status: DISCONTINUED | OUTPATIENT
Start: 2024-10-01 | End: 2024-10-02 | Stop reason: HOSPADM

## 2024-10-01 RX ORDER — SODIUM CHLORIDE, SODIUM LACTATE, POTASSIUM CHLORIDE, CALCIUM CHLORIDE 600; 310; 30; 20 MG/100ML; MG/100ML; MG/100ML; MG/100ML
INJECTION, SOLUTION INTRAVENOUS CONTINUOUS PRN
Status: DISCONTINUED | OUTPATIENT
Start: 2024-10-01 | End: 2024-10-01

## 2024-10-01 RX ORDER — HYDROMORPHONE HCL/PF 1 MG/ML
0.2 SYRINGE (ML) INJECTION EVERY 4 HOURS PRN
Status: DISCONTINUED | OUTPATIENT
Start: 2024-10-01 | End: 2024-10-02 | Stop reason: HOSPADM

## 2024-10-01 RX ORDER — ONDANSETRON 2 MG/ML
4 INJECTION INTRAMUSCULAR; INTRAVENOUS EVERY 6 HOURS PRN
Status: DISCONTINUED | OUTPATIENT
Start: 2024-10-01 | End: 2024-10-02 | Stop reason: HOSPADM

## 2024-10-01 RX ORDER — CEFAZOLIN SODIUM 1 G/50ML
1000 SOLUTION INTRAVENOUS ONCE
Status: COMPLETED | OUTPATIENT
Start: 2024-10-01 | End: 2024-10-01

## 2024-10-01 RX ORDER — TORSEMIDE 10 MG/1
10 TABLET ORAL DAILY
Status: DISCONTINUED | OUTPATIENT
Start: 2024-10-02 | End: 2024-10-02 | Stop reason: HOSPADM

## 2024-10-01 RX ORDER — LIDOCAINE HYDROCHLORIDE 10 MG/ML
INJECTION, SOLUTION EPIDURAL; INFILTRATION; INTRACAUDAL; PERINEURAL AS NEEDED
Status: DISCONTINUED | OUTPATIENT
Start: 2024-10-01 | End: 2024-10-01

## 2024-10-01 RX ADMIN — CALCIUM CARBONATE (ANTACID) CHEW TAB 500 MG 1000 MG: 500 CHEW TAB at 17:27

## 2024-10-01 RX ADMIN — CEFAZOLIN SODIUM 2000 MG: 1 SOLUTION INTRAVENOUS at 11:34

## 2024-10-01 RX ADMIN — MAGNESIUM SULFATE HEPTAHYDRATE 2 G: 40 INJECTION, SOLUTION INTRAVENOUS at 13:27

## 2024-10-01 RX ADMIN — FENTANYL CITRATE 50 MCG: 50 INJECTION, SOLUTION INTRAMUSCULAR; INTRAVENOUS at 11:57

## 2024-10-01 RX ADMIN — ACETAMINOPHEN 650 MG: 325 TABLET ORAL at 22:20

## 2024-10-01 RX ADMIN — PROPOFOL 100 MG: 10 INJECTION, EMULSION INTRAVENOUS at 11:30

## 2024-10-01 RX ADMIN — DEXAMETHASONE SODIUM PHOSPHATE 10 MG: 10 INJECTION INTRAMUSCULAR; INTRAVENOUS at 11:31

## 2024-10-01 RX ADMIN — REMIFENTANIL HYDROCHLORIDE 0.1 MCG/KG/MIN: 1 INJECTION, POWDER, LYOPHILIZED, FOR SOLUTION INTRAVENOUS at 12:06

## 2024-10-01 RX ADMIN — LIDOCAINE HYDROCHLORIDE 50 MG: 10 INJECTION, SOLUTION EPIDURAL; INFILTRATION; INTRACAUDAL; PERINEURAL at 11:30

## 2024-10-01 RX ADMIN — CALCIUM CARBONATE (ANTACID) CHEW TAB 500 MG 1000 MG: 500 CHEW TAB at 22:20

## 2024-10-01 RX ADMIN — EPHEDRINE SULFATE 10 MG: 50 INJECTION, SOLUTION INTRAVENOUS at 12:13

## 2024-10-01 RX ADMIN — LIDOCAINE HYDROCHLORIDE 50 MG: 10 INJECTION, SOLUTION EPIDURAL; INFILTRATION; INTRACAUDAL; PERINEURAL at 12:05

## 2024-10-01 RX ADMIN — EPHEDRINE SULFATE 5 MG: 50 INJECTION, SOLUTION INTRAVENOUS at 11:53

## 2024-10-01 RX ADMIN — FENTANYL CITRATE 50 MCG: 50 INJECTION, SOLUTION INTRAMUSCULAR; INTRAVENOUS at 11:30

## 2024-10-01 RX ADMIN — SODIUM CHLORIDE, SODIUM LACTATE, POTASSIUM CHLORIDE, AND CALCIUM CHLORIDE: .6; .31; .03; .02 INJECTION, SOLUTION INTRAVENOUS at 11:15

## 2024-10-01 RX ADMIN — SODIUM CHLORIDE, SODIUM LACTATE, POTASSIUM CHLORIDE, AND CALCIUM CHLORIDE 20 ML/HR: .6; .31; .03; .02 INJECTION, SOLUTION INTRAVENOUS at 18:24

## 2024-10-01 RX ADMIN — ACETAMINOPHEN 650 MG: 325 TABLET ORAL at 17:27

## 2024-10-01 RX ADMIN — MIDAZOLAM HYDROCHLORIDE 2 MG: 1 INJECTION, SOLUTION INTRAMUSCULAR; INTRAVENOUS at 11:21

## 2024-10-01 RX ADMIN — Medication 100 MG: at 11:30

## 2024-10-01 RX ADMIN — EPHEDRINE SULFATE 10 MG: 50 INJECTION, SOLUTION INTRAVENOUS at 13:13

## 2024-10-01 NOTE — CASE MANAGEMENT
Case Management Progress Note    Patient name Rachelle Narayanan  Location /-01 MRN [id number]  : 1945 Date 10/1/2024       LOS (days): 0  Geometric Mean LOS (GMLOS) (days):   Days to GMLOS:        OBJECTIVE:     Current admission status: Outpatient Surgery  Preferred Pharmacy:   [id number] - [facility] [address] [address]  Phone: [phone] Fax: [phone]    Primary Care Provider: Evangelist Shankar MD  Primary Insurance: [employer]  Secondary Insurance:     PROGRESS NOTE:  CM received message from surgical PA requesting CM consult for post-acute services.  Patient s/p parathyroidectomy with drain placement and will need care until she's seen in the OP setting.  [facility] referral opened for C.  CM will review with patient for choice/preference in the morning.    Metronidazole Pregnancy And Lactation Text: This medication is Pregnancy Category B and considered safe during pregnancy.  It is also excreted in breast milk.

## 2024-10-01 NOTE — OP NOTE
OPERATIVE REPORT  PATIENT NAME: Rachelle Narayanan    :  1945  MRN: [id number]  Pt Location: [facility] OR ROOM 02    SURGERY DATE: 10/1/2024    Surgeons and Role:     * Luis Miguel Costa MD - Primary     * Alan Flores PA-C    Preop Diagnosis:  Hyperparathyroidism (HCC) [E21.3]    Post-Op Diagnosis Codes:     * Hyperparathyroidism (HCC) [E21.3]    Procedure(s):  Bilateral - RESECTION OF RIGHT AND LEFT PARATHYROID GLANDS.  FOUR GLAND PARATHYROID EXPLORATION  INTRAOPERATIVE PTH AND LARYNGEAL NEVE MONITORING    Specimen(s):  ID Type Source Tests Collected by Time Destination   1 : LEFT INFERIOR PARATHYROID Tissue Parathyroid TISSUE EXAM Luis Miguel Costa MD 10/1/2024 1241    2 : RIGHT INFERIOR PARATHYROID GLAND Tissue Parathyroid TISSUE EXAM Luis Miguel Costa MD 10/1/2024 1321        Estimated Blood Loss:   Minimal    Drains:  * No LDAs found *    Anesthesia Type:   General    Operative Indications:  Hyperparathyroidism (HCC) [E21.3]      Operative Findings:  Left inferior hypercellular parathyroid gland.  Right inferior parathyroid gland was wrapped around right recurrent laryngeal nerve.  Right recurrent laryngeal nerve lysis was performed to obtain all the right inferior parathyroid gland tissues.  In the process of neurolysis right inferior parathyroid gland was removed in piecemeal fashion.  Postresection right inferior recurrent laryngeal nerve was stimulated and confirmed by EMG intact.        Complications:   None    Procedure and Technique:  This is a 79-year-old female with hypercalcemia and hyperparathyroidism.  Preoperative studies demonstrated right and left enlarged parathyroid gland.  Surgical consent was obtained in my office after explaining benefit procedure alternative and possible complication including permanent hypocalcemia as well as injury to the recurrent laryngeal nerve and its permanent consequences.  She was brought in today taken to the operating room.   She was placed in the OR table supine position proper timeout prophylactic antibiotics were given.  #7 Nims tube was utilized for intubation.  Table was placed slightly reverse Trendelenburg position with Roho behind the shoulders.  Entire neck and chest were prepped and draped in sterile normal fashion the skin incision was made at the skin crease subplatysmal flaps were created.  Midline was identified and left-sided strap muscles were retracted laterally.  All attention was diverted towards the left side of the neck.  Left inferior parathyroid gland was identified this was dissected from surrounding tissues.  This was sent to the pathology and we were notified hypercellular parathyroid gland.  Left superior parathyroid gland was identified just superior to inferior parathyroid gland size of the rice grain and intact.  Hemostasis was rechecked and achieved.      Resection of right inferior parathyroid gland.  Right side strap muscles were retracted laterally thyroid gland was medially rotated.  Inferior parathyroid gland was identified and large approximately 1.3 cm.  Further dissection we noticed recurrent laryngeal nerve was adherent to parathyroid capsule.  At this point meticulous dissection was continued to lysis of the recurrent laryngeal nerve.  In the process of dissection we had had to remove the right inferior parathyroid gland in piecemeal fashion.  This was sent to pathology after discussing over the phone, the reason for being piecemeal fashion.  I notified indeed is a hypercellular parathyroid gland.  Hemostasis were rechecked and achieved.  Previously divided left-sided strap muscles were put together with 3-0 Vicryl sutures.  Given she has some edema in the inferior aspect of the thyroid gland given her sternotomy scarring we decided to place and #7 drain..  Drain was secured to the skin with a 3-0 silk.  Wound was closed in layered fashion.  Skin was closed with running 4-0 Monocryl and Exofin  dressing in place.  Prior to closure of the skin we were notified sponge and instrument counts were correct x 2  Throughout the procedure we did obtain intraoperative to parathyroid hormone.  Post extubation no SOB . No hoarseness.     I was present for the entire procedure. and A physician assistant was required during the procedure for retraction, tissue handling, dissection and suturing.    Patient Disposition:  PACU              SIGNATURE: Luis Miguel Costa MD  DATE: October 1, 2024  TIME: 1:48 PM

## 2024-10-01 NOTE — ANESTHESIA PREPROCEDURE EVALUATION
Procedure:  RESECTION OF RIGHT AND LEFT PARATHYROID GLANDS, POSSIBLE FOUR GLAND PARATHYROID EXPLORATION (Bilateral: Neck)  INTRAOPERATIVE PTH AND LARYNGEAL NEVE MONITORING (Neck)    Relevant Problems   ANESTHESIA  Upper multiunit bridge damaged during intubation for MVr   (+) History of anesthesia complications      CARDIO   (+) Benign essential hypertension   (+) Mitral valve prolapse      ENDO   (+) Hyperparathyroidism (HCC)      GI/HEPATIC  Confirmed NPO appropriate      /RENAL (within normal limits)      HEMATOLOGY (within normal limits)      MUSCULOSKELETAL   (+) Chronic left-sided low back pain without sciatica      NEURO/PSYCH   (+) Chronic left-sided low back pain without sciatica      PULMONARY (within normal limits)   (-) Smoking   (-) URI (upper respiratory infection)      Surgery/Wound/Pain   (+) S/P MVR (mitral valve repair)      Other   (+) Dyslipidemia        Physical Exam    Airway    Mallampati score: II  TM Distance: >3 FB  Neck ROM: limited     Dental        Cardiovascular  Rhythm: regular, Rate: normal    Pulmonary   Breath sounds clear to auscultation    Other Findings  post-pubertal.      Anesthesia Plan  ASA Score- 3     Anesthesia Type- general with ASA Monitors.         Additional Monitors: arterial line.    Airway Plan: ETT.    Comment: Elective [place].       Plan Factors-Exercise tolerance (METS): >4 METS.    Chart reviewed. EKG reviewed.  Existing labs reviewed.     Patient is not a current smoker.              Induction- intravenous.    Postoperative Plan- Plan for postoperative opioid use. Planned trial extubation        Informed Consent- Anesthetic plan and risks discussed with patient and daughter.  I personally reviewed this patient with the CRNA. Discussed and agreed on the Anesthesia Plan with the CRNA..      TTE 7/24/24:  Narrative    This result has an attachment that is not available.    Left Ventricle: Systolic function is normal with an ejection fraction  of 61-65%. There  is grade I (mild) diastolic dysfunction and normal left  atrial pressure.    Aortic Valve: There is mild regurgitation.    Tricuspid Valve: There is trace regurgitation.      Lab Results   Component Value Date    WBC 7.34 08/26/2024    HGB 13.2 08/26/2024    HCT 40.8 08/26/2024     (H) 08/26/2024     08/26/2024     .lastbmp  Lab Results   Component Value Date    SODIUM 144 08/26/2024    K 4.1 08/26/2024     (H) 08/26/2024    CO2 22 08/26/2024    BUN 26 (H) 08/26/2024    CREATININE 1.02 08/26/2024    GLUC 112 (H) 11/10/2022    CALCIUM 9.6 08/26/2024     Lab Results   Component Value Date    ALT 14 08/26/2024    AST 15 08/26/2024    ALKPHOS 87 08/26/2024     Lab Results   Component Value Date    INR 1.4 06/17/2021    INR 0.9 06/12/2021    INR 1.0 04/06/2021     Lab Results   Component Value Date    HGBA1C 5.6 04/06/2023

## 2024-10-01 NOTE — PLAN OF CARE
Problem: PAIN - ADULT  Goal: Verbalizes/displays adequate comfort level or baseline comfort level  Description: Interventions:  - Encourage patient to monitor pain and request assistance  - Assess pain using appropriate pain scale  - Administer analgesics based on type and severity of pain and evaluate response  - Implement non-pharmacological measures as appropriate and evaluate response  - Consider cultural and social influences on pain and pain management  - Notify physician/advanced practitioner if interventions unsuccessful or patient reports new pain  Outcome: Progressing     Problem: INFECTION - ADULT  Goal: Absence or prevention of progression during hospitalization  Description: INTERVENTIONS:  - Assess and monitor for signs and symptoms of infection  - Monitor lab/diagnostic results  - Monitor all insertion sites, i.e. indwelling lines, tubes, and drains  - Monitor endotracheal if appropriate and nasal secretions for changes in amount and color  - [place] appropriate cooling/warming therapies per order  - Administer medications as ordered  - Instruct and encourage patient and family to use good hand hygiene technique  - Identify and instruct in appropriate isolation precautions for identified infection/condition  Outcome: Progressing     Problem: SAFETY ADULT  Goal: Patient will remain free of falls  Description: INTERVENTIONS:  - Educate patient/family on patient safety including physical limitations  - Instruct patient to call for assistance with activity   - Consult OT/PT to assist with strengthening/mobility   - Keep Call bell within reach  - Keep bed low and locked with side rails adjusted as appropriate  - Keep care items and personal belongings within reach  - Initiate and maintain comfort rounds  - Make Fall Risk Sign visible to staff  - Offer Toileting every 2 Hours, in advance of need  - Initiate/Maintain bed alarm  - Obtain necessary fall risk management equipment: none  - Apply yellow socks  and bracelet for high fall risk patients  - Consider moving patient to room near nurses station  Outcome: Progressing

## 2024-10-01 NOTE — INTERVAL H&P NOTE
H&P reviewed. After examining the patient I find no changes in the patients condition since the H&P had been written.    Vitals:    10/01/24 1032   BP: 145/84   Pulse: 67   Resp: 18   Temp: (!) 97.1 °F (36.2 °C)   SpO2: 98%

## 2024-10-02 VITALS
HEART RATE: 72 BPM | OXYGEN SATURATION: 96 % | WEIGHT: 153.22 LBS | HEIGHT: 57 IN | DIASTOLIC BLOOD PRESSURE: 74 MMHG | SYSTOLIC BLOOD PRESSURE: 125 MMHG | BODY MASS INDEX: 33.06 KG/M2 | TEMPERATURE: 97.7 F | RESPIRATION RATE: 16 BRPM

## 2024-10-02 PROBLEM — Z98.890 H/O PARATHYROIDECTOMY: Status: ACTIVE | Noted: 2024-10-02

## 2024-10-02 PROBLEM — Z90.89 H/O PARATHYROIDECTOMY: Status: ACTIVE | Noted: 2024-10-02

## 2024-10-02 LAB — CALCIUM SERPL-MCNC: 9.1 MG/DL (ref 8.4–10.2)

## 2024-10-02 PROCEDURE — 99024 POSTOP FOLLOW-UP VISIT: CPT | Performed by: SURGERY

## 2024-10-02 RX ORDER — OXYCODONE HYDROCHLORIDE 5 MG/1
5 TABLET ORAL EVERY 4 HOURS PRN
Qty: 10 TABLET | Refills: 0 | Status: CANCELLED | OUTPATIENT
Start: 2024-10-02 | End: 2024-10-05

## 2024-10-02 RX ADMIN — TORSEMIDE 10 MG: 10 TABLET ORAL at 09:13

## 2024-10-02 RX ADMIN — ACETAMINOPHEN 650 MG: 325 TABLET ORAL at 05:57

## 2024-10-02 RX ADMIN — CALCIUM CARBONATE (ANTACID) CHEW TAB 500 MG 1000 MG: 500 CHEW TAB at 09:12

## 2024-10-02 RX ADMIN — LOSARTAN POTASSIUM 50 MG: 50 TABLET, FILM COATED ORAL at 09:12

## 2024-10-02 RX ADMIN — METOPROLOL SUCCINATE 50 MG: 50 TABLET, EXTENDED RELEASE ORAL at 09:13

## 2024-10-02 RX ADMIN — ATORVASTATIN CALCIUM 20 MG: 20 TABLET, FILM COATED ORAL at 09:11

## 2024-10-02 NOTE — PLAN OF CARE
Problem: PAIN - ADULT  Goal: Verbalizes/displays adequate comfort level or baseline comfort level  Description: Interventions:  - Encourage patient to monitor pain and request assistance  - Assess pain using appropriate pain scale  - Administer analgesics based on type and severity of pain and evaluate response  - Implement non-pharmacological measures as appropriate and evaluate response  - Consider cultural and social influences on pain and pain management  - Notify physician/advanced practitioner if interventions unsuccessful or patient reports new pain  Outcome: Progressing     Problem: INFECTION - ADULT  Goal: Absence or prevention of progression during hospitalization  Description: INTERVENTIONS:  - Assess and monitor for signs and symptoms of infection  - Monitor lab/diagnostic results  - Monitor all insertion sites, i.e. indwelling lines, tubes, and drains  - Monitor endotracheal if appropriate and nasal secretions for changes in amount and color  - [place] appropriate cooling/warming therapies per order  - Administer medications as ordered  - Instruct and encourage patient and family to use good hand hygiene technique  - Identify and instruct in appropriate isolation precautions for identified infection/condition  Outcome: Progressing  Goal: Absence of fever/infection during neutropenic period  Description: INTERVENTIONS:  - Monitor WBC    Outcome: Progressing     Problem: SAFETY ADULT  Goal: Patient will remain free of falls  Description: INTERVENTIONS:  - Educate patient/family on patient safety including physical limitations  - Instruct patient to call for assistance with activity   - Consult OT/PT to assist with strengthening/mobility   - Keep Call bell within reach  - Keep bed low and locked with side rails adjusted as appropriate  - Keep care items and personal belongings within reach  - Initiate and maintain comfort rounds  - Make Fall Risk Sign visible to staff  - Offer Toileting every 2 Hours,  in advance of need  - Initiate/Maintain bed / chair alarm  - Obtain necessary fall risk management equipment: call bell within reach   - Apply yellow socks and bracelet for high fall risk patients  - Consider moving patient to room near nurses station  Outcome: Progressing  Goal: Maintain or return to baseline ADL function  Description: INTERVENTIONS:  -  Assess patient's ability to carry out ADLs; assess patient's baseline for ADL function and identify physical deficits which impact ability to perform ADLs (bathing, care of mouth/teeth, toileting, grooming, dressing, etc.)  - Assess/evaluate cause of self-care deficits   - Assess range of motion  - Assess patient's mobility; develop plan if impaired  - Assess patient's need for assistive devices and provide as appropriate  - Encourage maximum independence but intervene and supervise when necessary  - Involve family in performance of ADLs  - Assess for home care needs following discharge   - Consider OT consult to assist with ADL evaluation and planning for discharge  - Provide patient education as appropriate  Outcome: Progressing  Goal: Maintains/Returns to pre admission functional level  Description: INTERVENTIONS:  - Perform AM-PAC 6 Click Basic Mobility/ Daily Activity assessment daily.  - Set and communicate daily mobility goal to care team and patient/family/caregiver.   - Collaborate with rehabilitation services on mobility goals if consulted  - Perform Range of Motion 4 times a day.  - Reposition patient every 2 hours.  - Dangle patient 3 times a day  - Stand patient 3 times a day  - Ambulate patient 3 times a day  - Out of bed to chair 3 times a day   - Out of bed for meals 3 times a day  - Out of bed for toileting  - Record patient progress and toleration of activity level   Outcome: Progressing     Problem: DISCHARGE PLANNING  Goal: Discharge to home or other facility with appropriate resources  Description: INTERVENTIONS:  - Identify barriers to  discharge w/patient and caregiver  - Arrange for needed discharge resources and transportation as appropriate  - Identify discharge learning needs (meds, wound care, etc.)  - Arrange for interpretive services to assist at discharge as needed  - Refer to Case Management Department for coordinating discharge planning if the patient needs post-hospital services based on physician/advanced practitioner order or complex needs related to functional status, cognitive ability, or social support system  Outcome: Progressing

## 2024-10-02 NOTE — PLAN OF CARE
Problem: SAFETY ADULT  Goal: Maintain or return to baseline ADL function  Description: INTERVENTIONS:  -  Assess patient's ability to carry out ADLs; assess patient's baseline for ADL function and identify physical deficits which impact ability to perform ADLs (bathing, care of mouth/teeth, toileting, grooming, dressing, etc.)  - Assess/evaluate cause of self-care deficits   - Assess range of motion  - Assess patient's mobility; develop plan if impaired  - Assess patient's need for assistive devices and provide as appropriate  - Encourage maximum independence but intervene and supervise when necessary  - Involve family in performance of ADLs  - Assess for home care needs following discharge   - Consider OT consult to assist with ADL evaluation and planning for discharge  - Provide patient education as appropriate  Outcome: Progressing     Problem: INFECTION - ADULT  Goal: Absence of fever/infection during neutropenic period  Description: INTERVENTIONS:  - Monitor WBC    Outcome: Progressing     Problem: INFECTION - ADULT  Goal: Absence or prevention of progression during hospitalization  Description: INTERVENTIONS:  - Assess and monitor for signs and symptoms of infection  - Monitor lab/diagnostic results  - Monitor all insertion sites, i.e. indwelling lines, tubes, and drains  - Monitor endotracheal if appropriate and nasal secretions for changes in amount and color  - [place] appropriate cooling/warming therapies per order  - Administer medications as ordered  - Instruct and encourage patient and family to use good hand hygiene technique  - Identify and instruct in appropriate isolation precautions for identified infection/condition  Outcome: Progressing     Problem: Knowledge Deficit  Goal: Patient/family/caregiver demonstrates understanding of disease process, treatment plan, medications, and discharge instructions  Description: Complete learning assessment and assess knowledge base.  Interventions:  -  Provide teaching at level of understanding  - Provide teaching via preferred learning methods  Outcome: Progressing

## 2024-10-02 NOTE — PROGRESS NOTES
"Progress Note - General Surgery   Rachelle Narayanan 79 y.o. female MRN: [id number]  Unit/Bed#: -01 Encounter: [id number]    Assessment:  Rachelle Narayanan is a 79 y.o. female POD1 status post parathyroidectomy.    AVSS, labs within normal limits     Plan:  Discharge home.  Case management to coordinate home care for SIMONE drain.  Patient educated on wound care and follow-up as well as drain care.  PRN pain medication and anti-emetics  Encourage ambulation  DVT ppx: heparin  Incentive spirometry 10 times/hour while awake  Continue home medications as prescribed     Subjective/Objective    Subjective: No acute events overnight.     Objective:     Blood pressure 125/62, pulse 73, temperature 97.7 °F (36.5 °C), resp. rate 16, height 4' 9\" (1.448 m), weight 69.5 kg (153 lb 3.5 oz), SpO2 95%, not currently breastfeeding.,Body mass index is 33.16 kg/m².      Intake/Output Summary (Last 24 hours) at 10/2/2024 0853  Last data filed at 10/2/2024 0601  Gross per 24 hour   Intake 0 ml   Output 23 ml   Net -23 ml       Invasive Devices       Peripheral Intravenous Line  Duration             Peripheral IV 08/13/24 Right Antecubital 50 days    Peripheral IV 10/01/24 Right;Ventral (anterior) Hand <1 day              Drain  Duration             Closed/Suction Drain Anterior Neck Bulb 7 Fr. <1 day                    Physical Exam:   GEN: NAD  HEENT: anterior neck incision clean dry and intact with Prineo dressing in place.  No erythema, edema, exudate.  SIMONE drain site closed with 2 x 2 and Tegaderm dressing overlying.  CV: RRR, no m/r/g  Lung: Normal effort, CTA B/L, no w/r/r  Ab: Soft, NT/ND  Extrem: No CCE   Neuro: A+Ox3     Lab, Imaging and other studies:I have personally reviewed pertinent lab results.     VTE Pharmacologic Prophylaxis: Heparin  VTE Mechanical Prophylaxis: sequential compression device    Recent Results (from the past 36 hour(s))   PTH INTRAOP    Collection Time: 10/01/24  3:05 AM   Result Value Ref Range    " PTH INTRAOP 264.1 (H) 12.0 - 88.0 pg/mL   PTH INTRAOP    Collection Time: 10/01/24 10:35 AM   Result Value Ref Range    PTH INTRAOP 260.1 (H) 12.0 - 88.0 pg/mL   PTH INTRAOP    Collection Time: 10/01/24 12:22 PM   Result Value Ref Range    PTH INTRAOP 386.4 (H) 12.0 - 88.0 pg/mL   Tissue Exam    Collection Time: 10/01/24 12:41 PM   Result Value Ref Range    Case Report       Surgical Pathology Report                         Case: [id number]                                  Authorizing Provider:  Luis Miguel Costa,  Collected:           10/01/2024 1241                                     MD                                                                           Ordering Location:     [facility] Received:            10/01/2024 1322                                     Operating Room                                                               Pathologist:           Madi Smith MD                                                                Intraop:               Madi Smith MD                                                                Specimens:   A) - Parathyroid, LEFT INFERIOR PARATHYROID                                                         B) - Parathyroid, RIGHT INFERIOR PARATHYROID GLAND                                         Intraoperative Consultation       FSDxA (50% frozen): Findings suggestive of hypercellular parathyroid tissue (0.73 grams). Correlate with serum PTH levels.  Dr. Smith spoke with Dr. Costa at 1:07 pm on 10/01/2024.   Interpretation performed at [address] [address].  FSDxB (50% frozen): Findings suggestive of hypercellular parathyroid tissue (0.30 grams). Correlate with serum PTH levels.  Dr. Smith spoke with Dr. Costa at 1:40 pm on 10/01/2024.   Interpretation performed at [address] [address].     PTH INTRAOP    Collection Time: 10/01/24 12:43 PM   Result Value Ref Range    PTH INTRAOP  317.7 (H) 12.0 - 88.0 pg/mL   PTH INTRAOP    Collection Time: 10/01/24  1:27 PM   Result Value Ref Range    PTH INTRAOP 82.6 12.0 - 88.0 pg/mL   PTH INTRAOP    Collection Time: 10/01/24  2:35 PM   Result Value Ref Range    PTH INTRAOP 60.3 12.0 - 88.0 pg/mL   PTH, intact    Collection Time: 10/01/24  2:55 PM   Result Value Ref Range    PTH 30.8 12.0 - 88.0 pg/mL   Platelet count    Collection Time: 10/01/24  7:07 PM   Result Value Ref Range    Platelets 239 149 - 390 Thousands/uL    MPV 9.2 8.9 - 12.7 fL   Calcium    Collection Time: 10/02/24  5:36 AM   Result Value Ref Range    Calcium 9.1 8.4 - 10.2 mg/dL

## 2024-10-07 PROCEDURE — 88305 TISSUE EXAM BY PATHOLOGIST: CPT | Performed by: PATHOLOGY

## 2024-10-21 PROBLEM — D35.1 PARATHYROID ADENOMA: Status: ACTIVE | Noted: 2024-10-21

## 2024-10-25 ENCOUNTER — OFFICE VISIT (OUTPATIENT)
Dept: SURGICAL ONCOLOGY | Facility: CLINIC | Age: 79
End: 2024-10-25
Payer: COMMERCIAL

## 2024-10-25 VITALS
HEART RATE: 70 BPM | OXYGEN SATURATION: 98 % | SYSTOLIC BLOOD PRESSURE: 118 MMHG | BODY MASS INDEX: 33.12 KG/M2 | DIASTOLIC BLOOD PRESSURE: 88 MMHG | HEIGHT: 57 IN | WEIGHT: 153.5 LBS

## 2024-10-25 DIAGNOSIS — D35.1 PARATHYROID ADENOMA: Primary | ICD-10-CM

## 2024-10-25 DIAGNOSIS — Z90.89 STATUS POST SUBTOTAL PARATHYROIDECTOMY: ICD-10-CM

## 2024-10-25 DIAGNOSIS — Z98.890 STATUS POST SUBTOTAL PARATHYROIDECTOMY: ICD-10-CM

## 2024-10-25 DIAGNOSIS — R91.1 LUNG NODULE SEEN ON IMAGING STUDY: ICD-10-CM

## 2024-10-25 PROCEDURE — 99215 OFFICE O/P EST HI 40 MIN: CPT | Performed by: SURGERY

## 2024-10-25 NOTE — PROGRESS NOTES
Surgical Oncology Follow Up  [address] [facility] [address]  [facility] [place]  [address]  [place] [address]    Rachelle Narayanan  1945  [id number]      Chief Complaint   Patient presents with   • Post-op     Post op 10/1/24 left/right parathyroidectomy        Assessment & Plan:   79-year-old female s/p resection of parathyroid gland x 2.  She is clinically improved her symptoms she states no longer fogginess and feeling full of energy.  Her CT parathyroid demonstrated lung nodule need repeat scan in 3 months which we will order and see her.    Cancer History:     Oncology History    No history exists.         Interval History:   Follow-up post parathyroid gland resections x 2    Review of Systems:   Review of Systems   Constitutional:  Negative for chills and fever.   HENT:  Negative for ear pain and sore throat.    Eyes:  Negative for pain and visual disturbance.   Respiratory:  Negative for cough and shortness of breath.    Cardiovascular:  Negative for chest pain and palpitations.   Gastrointestinal:  Negative for abdominal pain and vomiting.   Genitourinary:  Negative for dysuria and hematuria.   Musculoskeletal:  Negative for arthralgias and back pain.   Skin:  Negative for color change and rash.   Neurological:  Negative for seizures and syncope.   All other systems reviewed and are negative.    Past Medical History     Patient Active Problem List   Diagnosis   • Benign essential hypertension   • Vitamin D insufficiency   • Dyslipidemia   • Other osteoporosis without current pathological fracture   • Seasonal allergies   • Varicose veins of left lower extremity with pain   • Bilateral lower extremity edema   • S/P MVR (mitral valve repair)   • Mitral valve prolapse   • Chronic heart failure with preserved ejection fraction (HCC)   • Age-related osteoporosis without current pathological fracture   • Cavovarus deformity of foot, acquired, left   • Pain of left hip   •  Chronic left-sided low back pain without sciatica   • Hyperparathyroidism (HCC)   • History of anesthesia complications   • H/O parathyroidectomy   • Parathyroid adenoma     Past Medical History:   Diagnosis Date   • Arthritis    • Cellulitis of face 10/16/2019    Bilateral nares   • Colon polyp    • Family history of colon cancer in mother    • Functional murmur    • Hypercalcemia 2016   • Hypertension     Dx'd in , controlled    • Osteoporosis    • Screening for breast cancer 2018   • SOB (shortness of breath) 2020   • Viral upper respiratory tract infection 10/30/2018     Past Surgical History:   Procedure Laterality Date   • BUNIONECTOMY Bilateral    • CARDIAC SURGERY     •  SECTION      x2, 1972 &    • CHG ASSAY OF PARATHORMONE N/A 10/1/2024    Procedure: INTRAOPERATIVE PTH AND LARYNGEAL NEVE MONITORING;  Surgeon: Luis Miguel Costa MD;  Location: [facility];  Service: Surgical Oncology   • DENTAL IMPLANT     • FL GUIDED NEEDLE PLAC BX/ASP/INJ  2024   • FLEXIBLE SIGMOIDOSCOPY     • MITRAL VALVE REPAIR     • AL COLONOSCOPY FLX DX W/COLLJ SPEC WHEN PFRMD N/A 10/10/2016    Procedure: COLONOSCOPY;  Surgeon: Patsy Escobedo MD;  [facility];  Service: Gastroenterology   • AL IONM 1 ON 1 IN OR W/ATTENDANCE EACH 15 MINUTES N/A 10/1/2024    Procedure: INTRAOPERATIVE PTH AND LARYNGEAL NEVE MONITORING;  Surgeon: Luis Miguel Costa MD;  [facility];  Service: Surgical Oncology   • AL PARATHYROIDECTOMY/EXPLORATION PARATHYROIDS Bilateral 10/1/2024    Procedure: RESECTION OF RIGHT AND LEFT PARATHYROID GLANDS, POSSIBLE FOUR GLAND PARATHYROID EXPLORATION;  Surgeon: Luis Miguel Costa MD;  [facility];  Service: Surgical Oncology   • TOE SURGERY Bilateral     hammer toe correction surgery   • TONSILLECTOMY      with Adenoidectomy     Family History   Problem Relation Age of Onset   • Colon cancer Mother 73   • Cancer Mother    • Aneurysm  Father    • No Known Problems Sister    • No Known Problems Paternal Aunt    • No Known Problems Paternal Aunt    • No Known Problems Paternal Aunt    • No Known Problems Paternal Aunt    • No Known Problems Maternal Grandmother    • No Known Problems Maternal Grandfather    • No Known Problems Paternal Grandmother    • No Known Problems Paternal Grandfather    • No Known Problems Daughter    • No Known Problems Son    • No Known Problems Son      Social History     Socioeconomic History   • Marital status: /Civil Union     Spouse name: Not on file   • Number of children: Not on file   • Years of education: Not on file   • Highest education level: Not on file   Occupational History   • Not on file   Tobacco Use   • Smoking status: Never     Passive exposure: Past   • Smokeless tobacco: Never   Vaping Use   • Vaping status: Never Used   Substance and Sexual Activity   • Alcohol use: Not Currently     Comment: Rarely    • Drug use: No   • Sexual activity: Not Currently     Partners: Male     Birth control/protection: None   Other Topics Concern   • Not on file   Social History Narrative    Caffeine use via coffee, 1 serv/day    Exercises regularly, walking      Social Determinants of Health     Financial Resource Strain: Low Risk  (5/11/2023)    Overall Financial Resource Strain (CARDIA)    • Difficulty of Paying Living Expenses: Not hard at all   Food Insecurity: No Food Insecurity (10/2/2024)    Nursing - Inadequate Food Risk Classification    • Worried About Running Out of Food in the Last Year: Never true    • Ran Out of Food in the Last Year: Never true    • Ran Out of Food in the Last Year: Not on file   Transportation Needs: No Transportation Needs (10/2/2024)    PRAPARE - Transportation    • Lack of Transportation (Medical): No    • Lack of Transportation (Non-Medical): No   Physical Activity: Not on file   Stress: Not on file   Social Connections: Not on file   Intimate Partner Violence: Not on file    Housing Stability: Low Risk  (10/2/2024)    Housing Stability Vital Sign    • Unable to Pay for Housing in the Last Year: No    • Number of Times Moved in the Last Year: 1    • Homeless in the Last Year: No       Current Outpatient Medications:   •  aspirin (ECOTRIN LOW STRENGTH) 81 mg EC tablet, Take 1 tablet (81 mg total) by mouth daily, Disp: 90 tablet, Rfl: 3  •  atorvastatin (LIPITOR) 20 mg tablet, Take 1 tablet (20 mg total) by mouth daily, Disp: 100 tablet, Rfl: 3  •  calcium carbonate (TUMS ULTRA) 1000 MG chewable tablet, Chew 1 tablet (1,000 mg total) 3 (three) times a day, Disp: 90 tablet, Rfl: 0  •  Coenzyme Q10 10 MG capsule, Take 1 capsule (10 mg total) by mouth daily, Disp: 90 capsule, Rfl: 3  •  denosumab (PROLIA) 60 mg/mL, Inject 1 mL (60 mg total) under the skin once for 1 dose, Disp: 1 mL, Rfl: 1  •  Diclofenac Sodium (VOLTAREN) 1 %, Apply 2 g topically 4 (four) times a day, Disp: 200 g, Rfl: 3  •  losartan (COZAAR) 50 mg tablet, Take 1 tablet (50 mg total) by mouth daily In the Morning.., Disp: 100 tablet, Rfl: 3  •  metoprolol succinate (TOPROL-XL) 50 mg 24 hr tablet, Take 1 tablet (50 mg total) by mouth daily In the evening daily, Disp: 90 tablet, Rfl: 3  •  torsemide (DEMADEX) 10 mg tablet, Take 1 tablet (10 mg total) by mouth daily, Disp: 90 tablet, Rfl: 3  •  vitamin B-12 (VITAMIN B-12) 1,000 mcg tablet, Take 1 tablet (1,000 mcg total) by mouth daily, Disp: 90 tablet, Rfl: 0  Allergies   Allergen Reactions   • Poison Ivy Extract Rash       Physical Exam:     Vitals:    10/25/24 0819   BP: 118/88   Pulse: 70   SpO2: 98%     Physical Exam  Constitutional:       Appearance: Normal appearance.   HENT:      Head: Normocephalic and atraumatic.      Nose: Nose normal.      Mouth/Throat:      Mouth: Mucous membranes are moist.   Eyes:      Pupils: Pupils are equal, round, and reactive to light.   Neck:        Comments: Healed neck incision.  Trachea is midline no hoarseness no shortness of breath  no JVD no palpable adenopathy.  Cardiovascular:      Rate and Rhythm: Normal rate.      Pulses: Normal pulses.      Heart sounds: Normal heart sounds.   Pulmonary:      Effort: Pulmonary effort is normal.      Breath sounds: Normal breath sounds.   Abdominal:      General: Bowel sounds are normal.      Palpations: Abdomen is soft.   Musculoskeletal:         General: Normal range of motion.      Cervical back: Normal range of motion and neck supple.   Skin:     General: Skin is warm.   Neurological:      General: No focal deficit present.      Mental Status: She is alert and oriented to person, place, and time.   Psychiatric:         Mood and Affect: Mood normal.         Behavior: Behavior normal.         Thought Content: Thought content normal.         Judgment: Judgment normal.       Results & Discussion:   A.  Left inferior parathyroid gland (resection):     - Hypercellular parathyroid tissue (0.73 grams).     B.  Right inferior parathyroid gland (resection):     - Hypercellular parathyroid tissue (0.3 grams).  I did review pathology in detail.  I did discussed in detail nature of hypercalcemia hyperparathyroidism primary were reviewed and discussed.  We also discussed in detail about incidental finding of lung nodule need repeat follow-up scans we will order first 1 in 3 months and follow her.  All patient's and her daughter's questions were answered to their satisfaction.  she understands and  agrees . All patient questions were answered.       Advance Care Planning/Advance Directives:  I Luis Miguel Costa MD discussed the disease status with Rachelle Narayanan  today 10/25/24  treatment plans and follow-up with the patient.

## 2024-10-29 ENCOUNTER — APPOINTMENT (OUTPATIENT)
Dept: LAB | Facility: AMBULARY SURGERY CENTER | Age: 79
End: 2024-10-29
Payer: COMMERCIAL

## 2024-10-29 DIAGNOSIS — I10 BENIGN ESSENTIAL HYPERTENSION: ICD-10-CM

## 2024-10-29 DIAGNOSIS — Z98.890 S/P MVR (MITRAL VALVE REPAIR): ICD-10-CM

## 2024-10-29 DIAGNOSIS — E04.1 THYROID NODULE: ICD-10-CM

## 2024-10-29 DIAGNOSIS — E78.5 DYSLIPIDEMIA: ICD-10-CM

## 2024-10-29 LAB
ALBUMIN SERPL BCG-MCNC: 3.8 G/DL (ref 3.5–5)
ALP SERPL-CCNC: 69 U/L (ref 34–104)
ALT SERPL W P-5'-P-CCNC: 18 U/L (ref 7–52)
ANION GAP SERPL CALCULATED.3IONS-SCNC: 9 MMOL/L (ref 4–13)
AST SERPL W P-5'-P-CCNC: 19 U/L (ref 13–39)
BILIRUB SERPL-MCNC: 0.54 MG/DL (ref 0.2–1)
BUN SERPL-MCNC: 17 MG/DL (ref 5–25)
CALCIUM SERPL-MCNC: 9.3 MG/DL (ref 8.4–10.2)
CHLORIDE SERPL-SCNC: 111 MMOL/L (ref 96–108)
CO2 SERPL-SCNC: 23 MMOL/L (ref 21–32)
CREAT SERPL-MCNC: 0.99 MG/DL (ref 0.6–1.3)
GFR SERPL CREATININE-BSD FRML MDRD: 55 ML/MIN/1.73SQ M
GLUCOSE P FAST SERPL-MCNC: 96 MG/DL (ref 65–99)
POTASSIUM SERPL-SCNC: 4.6 MMOL/L (ref 3.5–5.3)
PROT SERPL-MCNC: 6.4 G/DL (ref 6.4–8.4)
SODIUM SERPL-SCNC: 143 MMOL/L (ref 135–147)

## 2024-10-29 PROCEDURE — 80053 COMPREHEN METABOLIC PANEL: CPT

## 2024-10-29 PROCEDURE — 36415 COLL VENOUS BLD VENIPUNCTURE: CPT

## 2024-10-30 ENCOUNTER — OFFICE VISIT (OUTPATIENT)
Dept: OBGYN CLINIC | Facility: CLINIC | Age: 79
End: 2024-10-30
Payer: COMMERCIAL

## 2024-10-30 ENCOUNTER — ANESTHESIA EVENT (OUTPATIENT)
Dept: PERIOP | Facility: AMBULARY SURGERY CENTER | Age: 79
End: 2024-10-30
Payer: COMMERCIAL

## 2024-10-30 VITALS
WEIGHT: 153 LBS | SYSTOLIC BLOOD PRESSURE: 125 MMHG | BODY MASS INDEX: 33.01 KG/M2 | HEART RATE: 72 BPM | HEIGHT: 57 IN | DIASTOLIC BLOOD PRESSURE: 74 MMHG

## 2024-10-30 DIAGNOSIS — M19.072 ARTHRITIS OF LEFT MIDFOOT: Primary | ICD-10-CM

## 2024-10-30 DIAGNOSIS — Z01.818 PREOPERATIVE CLEARANCE: ICD-10-CM

## 2024-10-30 PROCEDURE — 99214 OFFICE O/P EST MOD 30 MIN: CPT | Performed by: ORTHOPAEDIC SURGERY

## 2024-10-30 RX ORDER — CHLORHEXIDINE GLUCONATE ORAL RINSE 1.2 MG/ML
15 SOLUTION DENTAL ONCE
Status: CANCELLED | OUTPATIENT
Start: 2024-10-30 | End: 2024-10-30

## 2024-10-30 RX ORDER — LOSARTAN POTASSIUM 50 MG/1
50 TABLET ORAL EVERY MORNING
Qty: 100 TABLET | Refills: 1 | Status: SHIPPED | OUTPATIENT
Start: 2024-10-30

## 2024-10-30 RX ORDER — CHLORHEXIDINE GLUCONATE 40 MG/ML
SOLUTION TOPICAL DAILY PRN
Status: CANCELLED | OUTPATIENT
Start: 2024-10-30

## 2024-10-30 RX ORDER — MULTIVIT WITH MINERALS/LUTEIN
250 TABLET ORAL DAILY
COMMUNITY

## 2024-10-30 NOTE — PROGRESS NOTES
James R Lachman, M.D.  Attending, Orthopaedic Surgery  Foot and Ankle  [facility]      ORTHOPAEDIC FOOT AND ANKLE CLINIC VISIT     Assessment:     Encounter Diagnoses   Name Primary?    Arthritis of left midfoot Yes    Preoperative clearance         Plan:   The patient verbalized understanding of exam findings and treatment plan. We engaged in the shared decision-making process and treatment options were discussed at length with the patient. Surgical and conservative management discussed today along with risks and benefits.  Patient with left midfoot arthritis of 2nd and 3rd TMT joints   She previously received corticosteroid injection under fluoroscopy guidance 6 months ago 4/5/2024, reports about 2.5 months of relief.  Tried carbon fiber footplate orthotic but states it caused discomfort on top of her foot  Patient educated to continue wear of supportive shoes and to try to adjust laces to increase comfort with wear of carbon fiber footplate  We will plan for left 2nd and 3rd TMT joint arthrodesis  Consent signed in clinic today   Patient has h/o Mitral valve repair and states she is seeing her cardiologist tomorrow. Will need EKG pre-operatively.   Also recently underwent thyroid nodule removal surgery and is currently on calcium supplementation  Return in about 3 weeks (around 11/20/2024) for postop.    CONSENT FOR BONY PROCEDURES:   Patient understands that there is no guarantee that the surgery will relieve all of Her pain and also understands that there may be a prolonged course of protected weight-bearing status required which will restrict them from driving and other activities as discussed at today's visit. Patient recognizes that there are risks with surgery including bleeding, numbness, nerve irritation, wound complications, infection, continued pain, joint stiffness, malunion, nonunion, anesthetic complications, death, failure of procedure and possible need for further  surgery. The patient understands that there is no guarantee that this surgery will relieve all of Her pain and symptoms.  Patient understands that there is no guarantee that they will return to full function after the procedure. Patient has provided informed consent for the procedure.     History of Present Illness:   Chief Complaint:   Chief Complaint   Patient presents with    Follow-up     Follow up of the left foot. Wants to talk about surgery.      Rachelle Narayanan is a 79 y.o. female who is being seen in follow-up for left foot. When we last saw she we recommended carbon fiber footplate orthotics and injection under fluoroscopy guidance.  Pain has  improved. Residual pain is localized at dorsal midfoot with minimal radiating and described as sharp and severe.      Pain/symptom timing:  Worse during the day when active  Pain/symptom context:  Worse with activites and work  Pain/symptom modifying factors:  Rest makes better, activities make worse  Pain/symptom associated signs/symptoms: none    Prior treatment   NSAIDsNo   Injections Yes   Bracing/Orthotics Yes    Physical Therapy No     Orthopedic Surgical History:   See below    Past Medical, Surgical and Social History:  Past Medical History:  has a past medical history of Arthritis, Cellulitis of face (10/16/2019), Colon polyp, Family history of colon cancer in mother, Functional murmur, Hypercalcemia (2016), Hypertension, Osteoporosis, Screening for breast cancer (2018), SOB (shortness of breath) (2020), and Viral upper respiratory tract infection (10/30/2018).  Problem List: does not have any pertinent problems on file.  Past Surgical History:  has a past surgical history that includes  section; Toe Surgery (Bilateral); Flexible sigmoidoscopy; Tonsillectomy; pr colonoscopy flx dx w/collj spec when pfrmd (N/A, 10/10/2016); Mitral valve repair; Cardiac surgery; FL guided needle plac bx/asp/inj (2024); Bunionectomy  "(Bilateral); Implant; pr parathyroidectomy/exploration parathyroids (Bilateral, 10/1/2024); chg assay of parathormone (N/A, 10/1/2024); and pr ionm 1 on 1 in or w/attendance each 15 minutes (N/A, 10/1/2024).  Family History: family history includes Aneurysm in her father; Cancer in her mother; Colon cancer (age of onset: 73) in her mother; No Known Problems in her daughter, maternal grandfather, maternal grandmother, paternal aunt, paternal aunt, paternal aunt, paternal aunt, paternal grandfather, paternal grandmother, sister, son, and son.  Social History:  reports that she has never smoked. She has been exposed to tobacco smoke. She has never used smokeless tobacco. She reports that she does not currently use alcohol. She reports that she does not use drugs.  Current Medications: has a current medication list which includes the following prescription(s): aspirin, atorvastatin, calcium carbonate, coenzyme q10, diclofenac sodium, losartan, metoprolol succinate, torsemide, vitamin b-12, and denosumab.  Allergies: is allergic to poison ivy extract.     Review of Systems:  General- denies fever/chills  HEENT- denies hearing loss or sore throat  Eyes- denies eye pain or visual disturbances, denies red eyes  Respiratory- denies cough or SOB  Cardio- denies chest pain or palpitations  GI- denies abdominal pain  Endocrine- denies urinary frequency  Urinary- denies pain with urination  Musculoskeletal- Negative except noted above  Skin- denies rashes or wounds  Neurological- denies dizziness or headache  Psychiatric- denies anxiety or difficulty concentrating    Physical Exam:   /74   Pulse 72   Ht 4' 9\" (1.448 m)   Wt 69.4 kg (153 lb)   BMI 33.11 kg/m²   General/Constitutional: No apparent distress: well-nourished and well developed.  Eyes: normal ocular motion  Lymphatic: No appreciable lymphadenopathy  Respiratory: Non-labored breathing  Vascular: No edema, swelling or tenderness, except as noted in detailed " exam.  Integumentary: No impressive skin lesions present, except as noted in detailed exam.  Neuro: No ataxia or tremors noted  Psych: Normal mood and affect, oriented to person, place and time. Appropriate affect.  Musculoskeletal: Normal, except as noted in detailed exam and in HPI.    Examination    Left    Gait Antalgic   Musculoskeletal Tender to palpation at dorsal midfoot    Skin Normal.      Nails Normal    Range of Motion  20 degrees dorsiflexion, 30 degrees plantarflexion  Subtalar motion: normal    Stability Stable    Muscle Strength 5/5 tibialis anterior  5/5 gastrocnemius-soleus  5/5 posterior tibialis  5/5 peroneal/eversion strength  5/5 EHL  5/5 FHL    Neurologic Normal    Sensation  Intact to light touch throughout sural, saphenous, superficial peroneal, deep peroneal and medial/lateral plantar nerve distributions.  [place] 5.07 filament (10g) testing  deferred.    Cardiovascular Brisk capillary refill < 2 seconds,intact DP and PT pulses    Special Tests None      Imaging Studies:   No new imaging    Scribe Attestation      I,:  Sheryl Lu PA-C am acting as a scribe while in the presence of the attending physician.:       I,:  James R Lachman, MD personally performed the services described in this documentation    as scribed in my presence.:               James R. Lachman, MD  Foot & Ankle Surgery   Department of Orthopaedic Surgery  [facility]      I personally performed the service.    James R. Lachman, MD

## 2024-10-30 NOTE — H&P (VIEW-ONLY)
James R Lachman, M.D.  Attending, Orthopaedic Surgery  Foot and Ankle  [facility]      ORTHOPAEDIC FOOT AND ANKLE CLINIC VISIT     Assessment:     Encounter Diagnoses   Name Primary?    Arthritis of left midfoot Yes    Preoperative clearance         Plan:   The patient verbalized understanding of exam findings and treatment plan. We engaged in the shared decision-making process and treatment options were discussed at length with the patient. Surgical and conservative management discussed today along with risks and benefits.  Patient with left midfoot arthritis of 2nd and 3rd TMT joints   She previously received corticosteroid injection under fluoroscopy guidance 6 months ago 4/5/2024, reports about 2.5 months of relief.  Tried carbon fiber footplate orthotic but states it caused discomfort on top of her foot  Patient educated to continue wear of supportive shoes and to try to adjust laces to increase comfort with wear of carbon fiber footplate  We will plan for left 2nd and 3rd TMT joint arthrodesis  Consent signed in clinic today   Patient has h/o Mitral valve repair and states she is seeing her cardiologist tomorrow. Will need EKG pre-operatively.   Also recently underwent thyroid nodule removal surgery and is currently on calcium supplementation  Return in about 3 weeks (around 11/20/2024) for postop.    CONSENT FOR BONY PROCEDURES:   Patient understands that there is no guarantee that the surgery will relieve all of Her pain and also understands that there may be a prolonged course of protected weight-bearing status required which will restrict them from driving and other activities as discussed at today's visit. Patient recognizes that there are risks with surgery including bleeding, numbness, nerve irritation, wound complications, infection, continued pain, joint stiffness, malunion, nonunion, anesthetic complications, death, failure of procedure and possible need for further  surgery. The patient understands that there is no guarantee that this surgery will relieve all of Her pain and symptoms.  Patient understands that there is no guarantee that they will return to full function after the procedure. Patient has provided informed consent for the procedure.     History of Present Illness:   Chief Complaint:   Chief Complaint   Patient presents with    Follow-up     Follow up of the left foot. Wants to talk about surgery.      Rachelle Narayanan is a 79 y.o. female who is being seen in follow-up for left foot. When we last saw she we recommended carbon fiber footplate orthotics and injection under fluoroscopy guidance.  Pain has  improved. Residual pain is localized at dorsal midfoot with minimal radiating and described as sharp and severe.      Pain/symptom timing:  Worse during the day when active  Pain/symptom context:  Worse with activites and work  Pain/symptom modifying factors:  Rest makes better, activities make worse  Pain/symptom associated signs/symptoms: none    Prior treatment   NSAIDsNo   Injections Yes   Bracing/Orthotics Yes    Physical Therapy No     Orthopedic Surgical History:   See below    Past Medical, Surgical and Social History:  Past Medical History:  has a past medical history of Arthritis, Cellulitis of face (10/16/2019), Colon polyp, Family history of colon cancer in mother, Functional murmur, Hypercalcemia (2016), Hypertension, Osteoporosis, Screening for breast cancer (2018), SOB (shortness of breath) (2020), and Viral upper respiratory tract infection (10/30/2018).  Problem List: does not have any pertinent problems on file.  Past Surgical History:  has a past surgical history that includes  section; Toe Surgery (Bilateral); Flexible sigmoidoscopy; Tonsillectomy; pr colonoscopy flx dx w/collj spec when pfrmd (N/A, 10/10/2016); Mitral valve repair; Cardiac surgery; FL guided needle plac bx/asp/inj (2024); Bunionectomy  "(Bilateral); Implant; pr parathyroidectomy/exploration parathyroids (Bilateral, 10/1/2024); chg assay of parathormone (N/A, 10/1/2024); and pr ionm 1 on 1 in or w/attendance each 15 minutes (N/A, 10/1/2024).  Family History: family history includes Aneurysm in her father; Cancer in her mother; Colon cancer (age of onset: 73) in her mother; No Known Problems in her daughter, maternal grandfather, maternal grandmother, paternal aunt, paternal aunt, paternal aunt, paternal aunt, paternal grandfather, paternal grandmother, sister, son, and son.  Social History:  reports that she has never smoked. She has been exposed to tobacco smoke. She has never used smokeless tobacco. She reports that she does not currently use alcohol. She reports that she does not use drugs.  Current Medications: has a current medication list which includes the following prescription(s): aspirin, atorvastatin, calcium carbonate, coenzyme q10, diclofenac sodium, losartan, metoprolol succinate, torsemide, vitamin b-12, and denosumab.  Allergies: is allergic to poison ivy extract.     Review of Systems:  General- denies fever/chills  HEENT- denies hearing loss or sore throat  Eyes- denies eye pain or visual disturbances, denies red eyes  Respiratory- denies cough or SOB  Cardio- denies chest pain or palpitations  GI- denies abdominal pain  Endocrine- denies urinary frequency  Urinary- denies pain with urination  Musculoskeletal- Negative except noted above  Skin- denies rashes or wounds  Neurological- denies dizziness or headache  Psychiatric- denies anxiety or difficulty concentrating    Physical Exam:   /74   Pulse 72   Ht 4' 9\" (1.448 m)   Wt 69.4 kg (153 lb)   BMI 33.11 kg/m²   General/Constitutional: No apparent distress: well-nourished and well developed.  Eyes: normal ocular motion  Lymphatic: No appreciable lymphadenopathy  Respiratory: Non-labored breathing  Vascular: No edema, swelling or tenderness, except as noted in detailed " exam.  Integumentary: No impressive skin lesions present, except as noted in detailed exam.  Neuro: No ataxia or tremors noted  Psych: Normal mood and affect, oriented to person, place and time. Appropriate affect.  Musculoskeletal: Normal, except as noted in detailed exam and in HPI.    Examination    Left    Gait Antalgic   Musculoskeletal Tender to palpation at dorsal midfoot    Skin Normal.      Nails Normal    Range of Motion  20 degrees dorsiflexion, 30 degrees plantarflexion  Subtalar motion: normal    Stability Stable    Muscle Strength 5/5 tibialis anterior  5/5 gastrocnemius-soleus  5/5 posterior tibialis  5/5 peroneal/eversion strength  5/5 EHL  5/5 FHL    Neurologic Normal    Sensation  Intact to light touch throughout sural, saphenous, superficial peroneal, deep peroneal and medial/lateral plantar nerve distributions.  [place] 5.07 filament (10g) testing  deferred.    Cardiovascular Brisk capillary refill < 2 seconds,intact DP and PT pulses    Special Tests None      Imaging Studies:   No new imaging    Scribe Attestation      I,:  Sheryl Lu PA-C am acting as a scribe while in the presence of the attending physician.:       I,:  James R Lachman, MD personally performed the services described in this documentation    as scribed in my presence.:               James R. Lachman, MD  Foot & Ankle Surgery   Department of Orthopaedic Surgery  [facility]      I personally performed the service.    James R. Lachman, MD

## 2024-10-30 NOTE — PROGRESS NOTES
Cardiology Follow-up    Rachelle Narayanan  [id number]  1945  [facility]  [address]  [place] [address]  Phone#  [phone]  Fax#  [phone]      1. Mitral valve prolapse        2. S/P MVR (mitral valve repair)        3. Chronic heart failure with preserved ejection fraction (HCC)  POCT ECG      4. Benign essential hypertension        5. Dyslipidemia          Discussion/Summary:  Mrs. Narayanan is a pleasant 79-year-old female who presents to the office today for routine follow-up.  Her last visit she feels well.  She offers no cardiac complaints.    Her blood pressure is well-controlled in the office today.  No changes were made to her medication regimen.  A low-salt diet was reinforced.    Her most recent lipids do reveal acceptable numbers on her current statin regimen to which no changes were advised.    She had an echocardiogram at our institution earlier this year and then a repeat at [facility] a short while later.  The echocardiogram done here revealed a mitral valve repair with no significant transvalvular gradient, no significant mitral regurgitation with moderate aortic insufficiency.  The repeat done at [facility] a short while reported mild aortic insufficiency.  Nonetheless no intervention is required and this will require ongoing surveillance with echoes.  She continues to follow-up with her cardiothoracic surgeon at [facility] as well.  She was reminded of the need for antibiotics prior to the dentist.    Regarding her upcoming surgery, she can proceed to the operating room at acceptable risk without further testing.    I will see her back in the office in six months or sooner if deemed necessary.    History of Present Illness:  Mrs. Narayanan is a pleasant 79-year-old female who presents to the office today for routine follow-up.    She is also in need of a preoperative  evaluation for an upcoming left foot arthrodesis.  Since her last visit with me she also underwent a parathyroidectomy.  Since that time she has been feeling well.  She reports less brain fog and more energy.  She has not been experiencing any shortness of breath with exertion.  She goes up the steps slowly in the setting of bilateral foot discomfort.  She denies any exertional chest pain with the activity she performs.  She reports lower extremity edema persistent throughout the day.  She will elevate her legs with improvement.  She denies any paroxysmal nocturnal dyspnea, orthopnea, acute weight gain or increasing abdominal girth.  She denies syncope or presyncope.  She denies palpitations or symptoms of claudication.    Patient Active Problem List   Diagnosis    Benign essential hypertension    Vitamin D insufficiency    Dyslipidemia    Other osteoporosis without current pathological fracture    Seasonal allergies    Varicose veins of left lower extremity with pain    Bilateral lower extremity edema    S/P MVR (mitral valve repair)    Mitral valve prolapse    Chronic heart failure with preserved ejection fraction (HCC)    Age-related osteoporosis without current pathological fracture    Cavovarus deformity of foot, acquired, left    Pain of left hip    Chronic left-sided low back pain without sciatica    Hyperparathyroidism (HCC)    History of anesthesia complications    H/O parathyroidectomy    Parathyroid adenoma    Lung nodule seen on imaging study    Arthritis of left midfoot     Past Medical History:   Diagnosis Date    Arthritis     Cellulitis of face 10/16/2019    Bilateral nares    Colon polyp     Family history of colon cancer in mother     Functional murmur     Hypercalcemia 11/21/2016    Hypertension     Dx'd in 2003, controlled     Osteoporosis     Screening for breast cancer 12/05/2018    SOB (shortness of breath) 07/31/2020    Viral upper respiratory tract infection 10/30/2018     Social History      Socioeconomic History    Marital status: /Civil Union     Spouse name: Not on file    Number of children: Not on file    Years of education: Not on file    Highest education level: Not on file   Occupational History    Not on file   Tobacco Use    Smoking status: Never     Passive exposure: Past    Smokeless tobacco: Never   Vaping Use    Vaping status: Never Used   Substance and Sexual Activity    Alcohol use: Not Currently    Drug use: No    Sexual activity: Not Currently     Partners: Male     Birth control/protection: None   Other Topics Concern    Not on file   Social History Narrative    Caffeine use via coffee, 1 serv/day    Exercises regularly, walking      Social Determinants of Health     Financial Resource Strain: Low Risk  (5/11/2023)    Overall Financial Resource Strain (CARDIA)     Difficulty of Paying Living Expenses: Not hard at all   Food Insecurity: No Food Insecurity (10/2/2024)    Nursing - Inadequate Food Risk Classification     Worried About Running Out of Food in the Last Year: Never true     Ran Out of Food in the Last Year: Never true     Ran Out of Food in the Last Year: Not on file   Transportation Needs: No Transportation Needs (10/2/2024)    PRAPARE - Transportation     Lack of Transportation (Medical): No     Lack of Transportation (Non-Medical): No   Physical Activity: Not on file   Stress: Not on file   Social Connections: Not on file   Intimate Partner Violence: Not on file   Housing Stability: Low Risk  (10/2/2024)    Housing Stability Vital Sign     Unable to Pay for Housing in the Last Year: No     Number of Times Moved in the Last Year: 1     Homeless in the Last Year: No      Family History   Problem Relation Age of Onset    Colon cancer Mother 73    Cancer Mother     Aneurysm Father     No Known Problems Sister     No Known Problems Paternal Aunt     No Known Problems Paternal Aunt     No Known Problems Paternal Aunt     No Known Problems Paternal Aunt     No Known  Problems Maternal Grandmother     No Known Problems Maternal Grandfather     No Known Problems Paternal Grandmother     No Known Problems Paternal Grandfather     No Known Problems Daughter     No Known Problems Son     No Known Problems Son      Past Surgical History:   Procedure Laterality Date    BUNIONECTOMY Bilateral     CARDIAC SURGERY       SECTION      x2, 1972 &     CHG ASSAY OF PARATHORMONE N/A 10/01/2024    Procedure: INTRAOPERATIVE PTH AND LARYNGEAL NEVE MONITORING;  Surgeon: Luis Miguel Costa MD;  Location: [facility];  Service: Surgical Oncology    COLONOSCOPY      DENTAL IMPLANT      FL GUIDED NEEDLE PLAC BX/ASP/INJ  2024    FLEXIBLE SIGMOIDOSCOPY      MITRAL VALVE REPAIR          RI COLONOSCOPY FLX DX W/COLLJ SPEC WHEN PFRMD N/A 10/10/2016    Procedure: COLONOSCOPY;  Surgeon: Patsy Escobedo MD;  [facility];  Service: Gastroenterology    RI IONM 1 ON 1 IN OR W/ATTENDANCE EACH 15 MINUTES N/A 10/01/2024    Procedure: INTRAOPERATIVE PTH AND LARYNGEAL NEVE MONITORING;  Surgeon: Luis Miguel Costa MD;  [facility];  Service: Surgical Oncology    RI PARATHYROIDECTOMY/EXPLORATION PARATHYROIDS Bilateral 10/01/2024    Procedure: RESECTION OF RIGHT AND LEFT PARATHYROID GLANDS, POSSIBLE FOUR GLAND PARATHYROID EXPLORATION;  Surgeon: Luis Miguel Costa MD;  Location[facility];  Service: Surgical Oncology    TOE SURGERY Bilateral     hammer toe correction surgery    TONSILLECTOMY      with Adenoidectomy       Current Outpatient Medications:     aspirin (ECOTRIN LOW STRENGTH) 81 mg EC tablet, Take 1 tablet (81 mg total) by mouth daily, Disp: 90 tablet, Rfl: 3    atorvastatin (LIPITOR) 20 mg tablet, Take 1 tablet (20 mg total) by mouth daily, Disp: 100 tablet, Rfl: 3    Coenzyme Q10 10 MG capsule, Take 1 capsule (10 mg total) by mouth daily, Disp: 90 capsule, Rfl: 3    Diclofenac Sodium (VOLTAREN) 1 %, Apply 2 g topically 4 (four) times a day (Patient  "taking differently: Apply 2 g topically if needed), Disp: 200 g, Rfl: 3    losartan (COZAAR) 50 mg tablet, TAKE ONE TABLET BY MOUTH EVERY DAY IN THE MORNING, Disp: 100 tablet, Rfl: 1    metoprolol succinate (TOPROL-XL) 50 mg 24 hr tablet, Take 1 tablet (50 mg total) by mouth daily In the evening daily, Disp: 90 tablet, Rfl: 3    torsemide (DEMADEX) 10 mg tablet, Take 1 tablet (10 mg total) by mouth daily, Disp: 90 tablet, Rfl: 3    ascorbic acid (VITAMIN C) 250 mg tablet, Take 250 mg by mouth daily (Patient not taking: Reported on 10/31/2024), Disp: , Rfl:     calcium carbonate (TUMS ULTRA) 1000 MG chewable tablet, Chew 1 tablet (1,000 mg total) 3 (three) times a day (Patient not taking: Reported on 10/31/2024), Disp: 90 tablet, Rfl: 0    denosumab (PROLIA) 60 mg/mL, Inject 1 mL (60 mg total) under the skin once for 1 dose (Patient taking differently: Inject 60 mg under the skin once Last dose July 2024), Disp: 1 mL, Rfl: 1    vitamin B-12 (VITAMIN B-12) 1,000 mcg tablet, Take 1 tablet (1,000 mcg total) by mouth daily (Patient not taking: Reported on 10/31/2024), Disp: 90 tablet, Rfl: 0  Allergies   Allergen Reactions    Poison Ivy Extract Rash         Imaging: No results found.  Review of Systems:  Review of Systems   Respiratory:  Negative for apnea, chest tightness, shortness of breath, wheezing and stridor.    Cardiovascular:  Positive for leg swelling.   Musculoskeletal:  Positive for arthralgias.   All other systems reviewed and are negative.        Vitals:    10/31/24 1611   BP: 122/78   BP Location: Left arm   Patient Position: Sitting   Cuff Size: Large   Pulse: 73   Weight: 70.1 kg (154 lb 9.6 oz)   Height: 4' 9\" (1.448 m)         Vitals:    10/31/24 1611   Weight: 70.1 kg (154 lb 9.6 oz)         Height: 4' 9\" (144.8 cm)     Physical Exam:  General:  Alert and cooperative, appears stated age  HEENT:  PERRLA, EOMI, no scleral icterus, no conjunctival pallor  Neck:  No lymphadenopathy, no thyromegaly, no " carotid bruits, no elevated JVP  Heart:  Regular rate and rhythm, normal S1/S2, no S3/S4, no murmur  Lungs:  Clear to auscultation bilaterally   Abdomen:  Soft, non-tender, positive bowel sounds, no rebound or guarding,   no organomegaly   Extremities: Positive pedal edema   Vascular:  2+ pedal pulses  Skin:  No rashes or lesions on exposed skin  Neurologic:  Cranial nerves II-XII grossly intact without focal deficits

## 2024-10-30 NOTE — PRE-PROCEDURE INSTRUCTIONS
Pre-Surgery Instructions:   Medication Instructions    ascorbic acid (VITAMIN C) 250 mg tablet Stop taking 7 days prior to surgery.    aspirin (ECOTRIN LOW STRENGTH) 81 mg EC tablet Instructions provided by MD at [facility] appt 10/31    atorvastatin (LIPITOR) 20 mg tablet Take night before surgery    calcium carbonate (TUMS ULTRA) 1000 MG chewable tablet Take night before surgery    Coenzyme Q10 10 MG capsule Stop taking 7 days prior to surgery.    Diclofenac Sodium (VOLTAREN) 1 % Hold day of surgery.    losartan (COZAAR) 50 mg tablet Hold day of surgery.    metoprolol succinate (TOPROL-XL) 50 mg 24 hr tablet Take day of surgery.    torsemide (DEMADEX) 10 mg tablet Hold day of surgery.    vitamin B-12 (VITAMIN B-12) 1,000 mcg tablet Stop taking 7 days prior to surgery.    Medication instructions for day surgery reviewed. Please use only a sip of water to take your instructed medications. Avoid all over the counter vitamins, supplements and NSAIDS for one week prior to surgery per anesthesia guidelines. Tylenol is ok to take as needed.     You will receive a call one business day prior to surgery with an arrival time and hospital directions. If your surgery is scheduled on a Monday, the hospital will be calling you on the Friday prior to your surgery. If you have not heard from anyone by 8pm, please call the hospital supervisor through the hospital  at [phone]. ([place] [phone] or [place] [phone]).  Patient to receive aspirin hold instructions from card dr at appt 10/31/24    Do not eat or drink anything after midnight the night before your surgery, including candy, mints, lifesavers, or chewing gum. Do not drink alcohol 24hrs before your surgery. Try not to smoke at least 24hrs before your surgery.       Follow the pre surgery showering instructions as listed in the “My Surgical Experience Booklet” or otherwise provided by your surgeon's office. Do not use a blade to shave the surgical area 1  week before surgery. It is okay to use a clean electric clippers up to 24 hours before surgery. Do not apply any lotions, creams, including makeup, cologne, deodorant, or perfumes after showering on the day of your surgery. Do not use dry shampoo, hair spray, hair gel, or any type of hair products.     No contact lenses, eye make-up, or artificial eyelashes. Remove nail polish, including gel polish, and any artificial, gel, or acrylic nails if possible. Remove all jewelry including rings and body piercing jewelry.     Wear causal clothing that is easy to take on and off. Consider your type of surgery.    Keep any valuables, jewelry, piercings at home. Please bring any specially ordered equipment (sling, braces) if indicated.    Arrange for a responsible person to drive you to and from the hospital on the day of your surgery. Please confirm the visitor policy for the day of your procedure when you receive your phone call with an arrival time.     Call the surgeon's office with any new illnesses, exposures, or additional questions prior to surgery.    Please reference your “My Surgical Experience Booklet” for additional information to prepare for your upcoming surgery.

## 2024-10-30 NOTE — PATIENT INSTRUCTIONS
James R Lachman, M.D.  Attending, Orthopaedic Surgery  [facility]  [place] Office Phone: [phone] ? Fax: [phone]  [place] Office Phone: [phone] ? Fax:[phone]    : [address] [place]     Surgery Coordinators [place]: Phyllis Toribio, [phone]  Ramila Hickman, [phone]  Surgery Coordinator [place]:  Wilmerdoyle Asael, [phone]                                                       Adele Emanuel, [phone]                                                            [email or web address]   PRE-OPERATIVE AND POST-OPERATIVE INSTRUCTIONS    General Information:  Your surgery is with Dr. Lachman.  Dates can change (although rare) depending on emergencies.  Typical post operative visits are at the following intervals:  3 weeks post surgery(except 1 week for bunions and wound monitoring), 6 weeks post surgery, 3 months post surgery, 6 months post surgery, and then on a yearly basis.  However, this may change based on Dr. Lachmans’ recommendation.  #1 post-operative rule for foot/ankle surgery:  ONCE YOU ARE OUT OF YOUR CAST AND/OR REMOVABLE BOOT, SWELLING MAY PERSIST FOR MANY MONTHS.  YOU MIGHT ALSO EXPERIENCE A BLUISH DISCOLORATION OF YOUR LEG.  THIS IS NORMAL AND PART OF THE USUAL POSTOPERATIVE EXPERIENCE.    SMOKING:  Smoking results in incomplete healing of fractures (broken bones) and joints that my have been fused.  Smoking and nicotine also prevents the growth of bone into ankle replacements and bone healing.  It also slows the healing of muscles and skin (soft tissue).  Therefore, please do not have surgery if you continue to smoke.  We reserve the right to cancel your surgery if we suspect that you are smoking.  DO NOT use nicorette gum or other patches.  Please find an alternative method to quit smoking before your surgery.    Pre-Operative Information:  Surgery date and preoperative visits:  If you have  medical problems, such as an abnormal EKG, history of BLOOD CLOT, ANEURYSM, and any other heart condition, please inform us so that we can get your medical clearance several weeks before the surgery.  Please bring any important medical information, such as an EKG, chest x-ray, or echocardiogram, with you to ensure that your surgery will not be delayed.  If needed, you will receive your preoperative appointments in the mail or by phone from our scheduling office.  The location of the preoperative appointment will be given to you also.  You may not eat after midnight the night before surgery.  If you do, your surgery will be cancelled.   You will receive a phone call from your surgery center the day before your surgery (if your surgery is on a Monday, you will get a call the Friday before).   If you do not hear from someone by 4pm the day before your surgery, please call the Surgical coordinator (number above) to notify us.  Start taking Vitamin D3 4000 units per day and Calcium 1200mg per day immediately. You will continue this until your 3 month post-op visit. These are over the counter and available at all pharmacies and supermarkets.  FOR THOSE HAVING SURGERY AT [facility]- IF YOU WILL NEED CRUTCHES OR A ROLLING WALKER AFTER SURGERY, ASK FOR A PRESCRIPTION FOR THIS FROM OUR OFFICE TODAY.  THIS CANNOT BE HANDLED THE DAY OF SURGERY AS [facility] DOES NOT STOCK THESE.    Because bacterial can often enter any defect in the skin, it is important to avoid any cuts before surgery.  Any breaks in the skin on the leg will often result in your surgery being postponed.  Please avoid going on a very long walk the day prior to surgery, or doing other activities that could lead to irritation of the skin, including yard work, extra athletic activity, or shaving.   This could result in surgery cancellation.  You MUST be fasting the day of your surgery.  Therefore, please do not consume any foot or beverage  after midnight the night before surgery.  The morning of surgery you may take your usual medications with a sip of water.  It is important not to take anti-inflammatory medication like Ibuprofen, Motrin, Naproxen (Aleve), or Aspirin 7-10 days before surgery because they will make you bleed more than usual.  Vitamin, E, Plavix and Coumadin also have the same effect.  Stop Aspirin and Vitamin E two weeks before surgery.  YOUR MEDICAL DOCTOR SHOULD TELL YOU WHEN TO STOP COUMADIN OR PLAVIX.  If your surgery involves any bone healing, please do not take anti-inflammatories for at least 6 weeks after surgery.  This can impede bone healing (ibuprofen, Aleve, Relafen, iodine).  Tylenol is fine to take.    PREOPERATIVE BATHING INSTRUCTIONS:    Before your surgery, bathe with Hibiclens (4% Chlorhexidene) as instructed below.  This skin cleanser will help reduce the bacteria on your skin before surgery.  To avoid irritating your eyes, do not apply Hibiclens above the level of your neck.  On the evening before AND the morning of surgery, bathe your entire body except the face and scalp, then rinse freely.  DO NOT apply to your face or scalp, as Hibiclens can irritate your eyes.  Purchasing information:   Hibiclens is available without a prescription at most retail pharmacies.     ADDITIONAL INSTRUCTIONS:  PATIENTS HAVING FOOT/ANKLE SURGERY     In preparation for your upcoming surgery, we kindly request and advise the following:  Notify our office if you are taking any of the following:  Coumadin (warfarin):  Persantine (dipyridamole); Pletal (cilostazol); Plavix (clopidogrel); Ticlid (ticlopidine); Agrylin (anagrelide); Aggrenox (dipyridamole and aspirin) or other blood thinners,.  In addition, stop taking Vitamin E and herbal supplements.  Do not schedule any elective dental work for at least 6 months after surgery.  If you had an ankle replacement, you will need to take antibiotics before any future dental procedures. Your  dentist or our office can prescribe these for you.  1000mg of Amoxicillin 1 hour prior to any dental procedure is the recommended dosing.      THREE RULES:    After surgery you will most likely be given the instructions “KEEP YOUR TOES ABOVE YOUR NOSE.”  This means that you MUST have your feet elevated higher than your heart.  Keeping your toes above your nose helps to heal the muscles and skin (soft tissues) by reducing swelling in your leg.  This position also helps to prevent infection, and is very important in avoiding deep venous thrombosis (blood clots).    In order to keep the blood circulating in your legs and in order to avoid deep vein   thrombosis (blood clots), we ask patients to GET UP ONCE AN HOUR during the day.  This means you should at least cross the room and come back.  It does not mean you have to be up for long periods of time.  In most cases we will not have people immediately put any weight on their operated part.  This is important to prevent loosening of metal or other devices holding the bones together.  It also prevents irritation of the soft tissues which can lead to prolonged healing.  When we say get up once an hour, please walk, hop or move with an assisted device.  This is important!    Do not do any excessive walking during the first few days after surgery.  Recovering from surgery is a full-time task for the patient.  Postoperative care is important to avoid irritating the skin incision, which can lead to infection.  Please do not plan activities or go out of town for several weeks after surgery.  If you are unsure about your future activities, please schedule surgery only when you know it is acceptable for you.  Scheduling surgery and then canceling the date, prevents other people from having surgery on that date as it takes time to line everything up effectively.  If you cancel your surgery the week of your planned surgery, we reserve the right to cancel all future surgical  procedures.    THE DAY OF SURGERY:    Arrival to the hospital or outpatient surgical center on time is imperative.  If you arrive late, then your surgery will be cancelled.  You MUST have a family member/friend bring you, stay with you throughout the DURATION of your surgery, and drive you home.  You MUST be fasting the day of your surgery.  Therefore, do not consume any food or beverage after midnight the night before surgery.  At your pre-operative visit with the anesthesia staff, or during your phone screen, a nurse will instruct you what medications you will need to take the day of surgery.  MAKE SURE THAT THE PHARMACY LISTED IN THE ELECTRONIC MEDICAL RECORD (EPIC) IS YOUR PREFERRED PHARMACY. For example, if you are staying with family or a friend, and will not be near your preferred pharmacy, YOU MUST, tell the nurses checking you in the day of surgery so that this can be changed in the system.  If your prescriptions are sent to a pharmacy, this cannot be changed.      AFTER YOUR SURGERY:  Bleeding through the bandage almost always occurs.  Do not let this alarm you.  Simply add more gauze or a towel, call us, and come in for a dressing change.   If you think it is excessive, contact us immediately or go to the local emergency room.    Do not get the bandage wet.  Showering is possible with plastic protectors.   Be very careful, as the bathroom can be wet and slippery.  If you do get your dressing wet, it should be changed immediately.  Please contact us.      ONCE YOUR ARE OUT OF YOUR CAST AND/OR REMOVABLE BOOT, SWELLING MAY PERSIST FOR MANY MONTHS.  YOU MIGHT ALSO EXPERIENCE A BLUISH DISCOLORATION OF YOUR LEG.  THIS IS NORMAL AND PART OF THE USUAL POSTOPERATIVE EXPERIENCE.  WEARING COMPRESSION HOSE (ELASTIC STOCKINGS) CAN HELP AVOID SOME OF THIS SWELLING.      DRESSING:   The purpose of the surgical dressing is to keep your wound and the surgical site protected from the environment.  Most dressings contain  splints, which help to hold your foot and ankle in a corrected position, and also allow the surgical site to heal properly. Dressings will remain in place and undisturbed until the first postop visit.   If you have a drain in place, this will need to be removed in 1-3 days after surgery.  The time for the drain to be pulled will be written on your discharge instruction sheet.    CAST  INSTRUCTIONS:  You may or may not get a cast following surgery.  If you do, pay close attention to the following:    After application of a splint or cast, it is very important to elevate your leg for 24 to 72 hours.   The injured area should be elevated well above the heart.   Remember “Toes above your Nose”.  Rest and elevation greatly reduce pain and speed the healing process by minimizing early swelling.    CALL YOUR DOCTORS OFFICE OR VISIT LOCATION EMERGENCY ROOM IF YOU HAVE ANY OF THE FOLLOWING:    Significant increased pain, which may be caused by swelling, and the feeling that the splint or cast is too tight  Numbness and tingling in your hand or foot, which may be caused by too much pressure on the nerves  Burning and stinging, which may be caused by too much pressure on the skin  Excessive swelling below the cast, which may mean the cast is slowing your blood circulation  Loss of active movement of toes, which request an urgent evaluation  Loss of “capillary refill”.  Pinch the tip of toes and maryam the skin.  Release pressure and if the skin does not return pink then call the office immediately.      DO NOT GET YOUR CAST WET.   Bacteria thrive in moist dark areas.  We do not want this.   If your cast becomes wet, return to the office and we will apply another one.    PAIN AFTER SURGERY:  Narcotic pain medication can and will depress your respiratory system if taken in excess.  The goal of pain management with narcotics is to be comfortable not pain free.  If you take enough narcotics to be pain free then you run the risk of  stopping breathing.  If this happens, call 911 immediately!  Pain in the heel is often  caused by pressure from the weight of your foot on the bed.  Make sure your heel is suspended off the bed by keeping a pillow underneath your calf not your knee.    Medications:  You will be given narcotic pain medication. Do NOT drive while taking narcotic medications. Medications such as Darvocet, Percocet, Vicoden or Tylenol #3, also contain acetaminophen (Tylenol). Do not take acetaminophen or Tylenol from home when taking theses medications. When you fill your prescription, you may ask the pharmacist if your pain medication has acetaminophen/Tylenol in it. It is okay to take Tylenol with Oxycontin/Oxycodone. Should you have pain after taking your prescription medication, ibuprophen (Motrin, Advil, and Alleve) is a common over the counter preparation and may often be taken with the prescription pain medication as long as you take them with food. These medications can irritate the stomach lining.   Unless you are allergic to aspirin or currently taking a blood thinner, Dr. Lachmans’ patients are requested to take one 81 mg aspirin every 12 hours until you are back to walking normally after surgery (This can be up to 6 weeks).  Narcotic medications commonly cause nausea. Taking them with food will decrease this side effect. If you are having extreme nausea, please contact us for an alternative medication or for something that can be taken with this medication to decrease the nausea.   Also, narcotic medications frequently cause constipation. An increase of fiber, fruits and vegetables in your diet may alleviate this problem, or if necessary, you may use an over-the-counter medication such as senekot, colace, or Fibercon for constipation problems.   You should resume all medications you were taking prior to the surgery unless otherwise specified.     Activity:   Because of your recent foot surgery, your activity level will  decrease. You will need to elevate your foot ABOVE the level of your heart for a minimum of four days. The length of time necessary for the swelling to go down, and for your wounds to heal properly depends greatly on your efforts here. Elevation is extremely important to avoid compromising the blood supply to your foot. Remember when your foot is down it will swell, which will increase pain and slow healing. Wiggle your toes frequently if possible.   If you go home with a regional block, (a type of anesthesia) the foot and leg will be numb. Think of ways to get into your house and around the house until the block wears off.   Keep in mind that it may be a legal issue if you drive while in a cast or splint, especially when the splint is on the right foot. You may call the [employer] of [employer] to schedule a road test if you have adaptive equipment applied to your car.   The amount of weight you are allowed to bear on your foot will be written on your discharge sheet filled out at the time of surgery. The following is an explanation of the possibilities:       COVID-19 Policies  [facility] has the following policies when it comes to ELECTIVE surgery  No elective surgery requiring anesthesia until 7 weeks after a patient tested positive for COVID-19   No elective surgery requiring anesthesia until 3 months after a patient was hospitalized for COVID-19      Non-weight bearing:   You are to put NO weight whatsoever on your foot. When using crutches or a walker, your foot should not touch the ground, except when you are standing. Then, it may rest on the ground. If you are to be non-weight bearing, and you are not compliant, you could compromise the surgery.     Some of our patients have been requesting prescriptions for a roll-a-bout knee scooter. [employer] and other insurances have been denying these claims, and you may either have to rent one or pay out of pocket to purchase one.  THIS SHOULD BE  PURCHASED PRIOR TO THE SURGERY AND YOU SHOULD BRING IT WITH YOU THE DAY OF THE SURGERY TO AIDE IN GETTING FROM THE CAR INTO THE HOUSE AFTER SURGERY.

## 2024-10-31 ENCOUNTER — RA CDI HCC (OUTPATIENT)
Dept: OTHER | Facility: HOSPITAL | Age: 79
End: 2024-10-31

## 2024-10-31 ENCOUNTER — OFFICE VISIT (OUTPATIENT)
Dept: CARDIOLOGY CLINIC | Facility: CLINIC | Age: 79
End: 2024-10-31
Payer: COMMERCIAL

## 2024-10-31 VITALS
SYSTOLIC BLOOD PRESSURE: 122 MMHG | WEIGHT: 154.6 LBS | HEIGHT: 57 IN | BODY MASS INDEX: 33.36 KG/M2 | HEART RATE: 73 BPM | DIASTOLIC BLOOD PRESSURE: 78 MMHG

## 2024-10-31 DIAGNOSIS — I50.32 CHRONIC HEART FAILURE WITH PRESERVED EJECTION FRACTION (HCC): ICD-10-CM

## 2024-10-31 DIAGNOSIS — Z98.890 S/P MVR (MITRAL VALVE REPAIR): ICD-10-CM

## 2024-10-31 DIAGNOSIS — E78.5 DYSLIPIDEMIA: ICD-10-CM

## 2024-10-31 DIAGNOSIS — I34.1 MITRAL VALVE PROLAPSE: Primary | ICD-10-CM

## 2024-10-31 DIAGNOSIS — I10 BENIGN ESSENTIAL HYPERTENSION: ICD-10-CM

## 2024-10-31 PROCEDURE — 99214 OFFICE O/P EST MOD 30 MIN: CPT | Performed by: INTERNAL MEDICINE

## 2024-10-31 PROCEDURE — 93000 ELECTROCARDIOGRAM COMPLETE: CPT | Performed by: INTERNAL MEDICINE

## 2024-10-31 NOTE — LETTER
Cardiology Pre Operative Clearance      PRE OPERATIVE CARDIAC RISK ASSESSMENT    10/31/24    Rachelle Narayanan  1945  [id number]    Date of Surgery: 11/7/2024    Type of Surgery: left 2nd and 3rd TMT joint arthrodeses    Surgeon:  Dr J. Lachman    No Cardiac Contraindication for Planned Surgical Procedures    Anticoagulation: Aspirin should be continued perioperatively.    Physician Comment: Ok to proceed with surgery at acceptable risk without further testing.    Electronically Signed: Katerine Nieto DO

## 2024-11-01 ENCOUNTER — TELEPHONE (OUTPATIENT)
Age: 79
End: 2024-11-01

## 2024-11-01 NOTE — TELEPHONE ENCOUNTER
Patient stated she just received a call from office but when she answered, person hung up. I advised I did not see that anyone from office called her just now. She stated she is having surgery and if it's important, whoever called will call her back.

## 2024-11-07 ENCOUNTER — TELEPHONE (OUTPATIENT)
Dept: OBGYN CLINIC | Facility: HOSPITAL | Age: 79
End: 2024-11-07

## 2024-11-07 ENCOUNTER — ANESTHESIA (OUTPATIENT)
Dept: PERIOP | Facility: AMBULARY SURGERY CENTER | Age: 79
End: 2024-11-07
Payer: COMMERCIAL

## 2024-11-07 ENCOUNTER — HOSPITAL ENCOUNTER (OUTPATIENT)
Facility: AMBULARY SURGERY CENTER | Age: 79
Setting detail: OUTPATIENT SURGERY
Discharge: HOME | End: 2024-11-07
Attending: ORTHOPAEDIC SURGERY | Admitting: ORTHOPAEDIC SURGERY
Payer: COMMERCIAL

## 2024-11-07 ENCOUNTER — APPOINTMENT (OUTPATIENT)
Dept: RADIOLOGY | Facility: AMBULARY SURGERY CENTER | Age: 79
End: 2024-11-07
Payer: COMMERCIAL

## 2024-11-07 VITALS
OXYGEN SATURATION: 97 % | WEIGHT: 153.6 LBS | BODY MASS INDEX: 33.14 KG/M2 | DIASTOLIC BLOOD PRESSURE: 67 MMHG | TEMPERATURE: 97 F | RESPIRATION RATE: 20 BRPM | HEIGHT: 57 IN | SYSTOLIC BLOOD PRESSURE: 126 MMHG | HEART RATE: 67 BPM

## 2024-11-07 DIAGNOSIS — M19.072 ARTHRITIS OF LEFT MIDFOOT: Primary | ICD-10-CM

## 2024-11-07 PROCEDURE — 73620 X-RAY EXAM OF FOOT: CPT

## 2024-11-07 PROCEDURE — 28730 FUSION OF FOOT BONES: CPT | Performed by: ORTHOPAEDIC SURGERY

## 2024-11-07 PROCEDURE — C1781 MESH (IMPLANTABLE): HCPCS | Performed by: ORTHOPAEDIC SURGERY

## 2024-11-07 PROCEDURE — C1713 ANCHOR/SCREW BN/BN,TIS/BN: HCPCS | Performed by: ORTHOPAEDIC SURGERY

## 2024-11-07 PROCEDURE — C9290 INJ, BUPIVACAINE LIPOSOME: HCPCS | Performed by: INTERNAL MEDICINE

## 2024-11-07 PROCEDURE — C1734 ORTH/DEVIC/DRUG BN/BN,TIS/BN: HCPCS | Performed by: ORTHOPAEDIC SURGERY

## 2024-11-07 DEVICE — HEADLESS COMPRESSION SCREW
Type: IMPLANTABLE DEVICE | Site: FOOT | Status: FUNCTIONAL
Brand: FIXOS

## 2024-11-07 DEVICE — GRAFT BIO4 ORTHO BONE MATRIX 2.5ML-: Type: IMPLANTABLE DEVICE | Site: FOOT | Status: FUNCTIONAL

## 2024-11-07 DEVICE — BONE SCREW, FULLY THREADED, T8
Type: IMPLANTABLE DEVICE | Site: FOOT | Status: FUNCTIONAL
Brand: VARIAX

## 2024-11-07 DEVICE — LOCKING SCREW, FULLY THREADED,T8
Type: IMPLANTABLE DEVICE | Site: FOOT | Status: FUNCTIONAL
Brand: VARIAX

## 2024-11-07 DEVICE — SLIM STRAIGHT PLATE
Type: IMPLANTABLE DEVICE | Site: FOOT | Status: FUNCTIONAL
Brand: VARIAX

## 2024-11-07 RX ORDER — LIDOCAINE HYDROCHLORIDE 10 MG/ML
0.5 INJECTION, SOLUTION EPIDURAL; INFILTRATION; INTRACAUDAL; PERINEURAL ONCE AS NEEDED
Status: DISCONTINUED | OUTPATIENT
Start: 2024-11-07 | End: 2024-11-07 | Stop reason: HOSPADM

## 2024-11-07 RX ORDER — OXYCODONE HYDROCHLORIDE 5 MG/1
5 TABLET ORAL EVERY 4 HOURS PRN
Qty: 30 TABLET | Refills: 0 | Status: SHIPPED | OUTPATIENT
Start: 2024-11-07

## 2024-11-07 RX ORDER — ONDANSETRON 2 MG/ML
INJECTION INTRAMUSCULAR; INTRAVENOUS AS NEEDED
Status: DISCONTINUED | OUTPATIENT
Start: 2024-11-07 | End: 2024-11-07

## 2024-11-07 RX ORDER — SODIUM CHLORIDE, SODIUM LACTATE, POTASSIUM CHLORIDE, CALCIUM CHLORIDE 600; 310; 30; 20 MG/100ML; MG/100ML; MG/100ML; MG/100ML
125 INJECTION, SOLUTION INTRAVENOUS CONTINUOUS
Status: DISCONTINUED | OUTPATIENT
Start: 2024-11-07 | End: 2024-11-07 | Stop reason: HOSPADM

## 2024-11-07 RX ORDER — CEFAZOLIN SODIUM 2 G/50ML
2000 SOLUTION INTRAVENOUS ONCE
Status: COMPLETED | OUTPATIENT
Start: 2024-11-07 | End: 2024-11-07

## 2024-11-07 RX ORDER — PROPOFOL 10 MG/ML
INJECTION, EMULSION INTRAVENOUS AS NEEDED
Status: DISCONTINUED | OUTPATIENT
Start: 2024-11-07 | End: 2024-11-07

## 2024-11-07 RX ORDER — VANCOMYCIN HYDROCHLORIDE 1 G/20ML
INJECTION, POWDER, LYOPHILIZED, FOR SOLUTION INTRAVENOUS AS NEEDED
Status: DISCONTINUED | OUTPATIENT
Start: 2024-11-07 | End: 2024-11-07 | Stop reason: HOSPADM

## 2024-11-07 RX ORDER — BUPIVACAINE HYDROCHLORIDE 5 MG/ML
INJECTION, SOLUTION EPIDURAL; INTRACAUDAL AS NEEDED
Status: DISCONTINUED | OUTPATIENT
Start: 2024-11-07 | End: 2024-11-07

## 2024-11-07 RX ORDER — FENTANYL CITRATE 50 UG/ML
INJECTION, SOLUTION INTRAMUSCULAR; INTRAVENOUS AS NEEDED
Status: DISCONTINUED | OUTPATIENT
Start: 2024-11-07 | End: 2024-11-07

## 2024-11-07 RX ORDER — LIDOCAINE HYDROCHLORIDE 10 MG/ML
INJECTION, SOLUTION EPIDURAL; INFILTRATION; INTRACAUDAL; PERINEURAL AS NEEDED
Status: DISCONTINUED | OUTPATIENT
Start: 2024-11-07 | End: 2024-11-07

## 2024-11-07 RX ORDER — ASPIRIN 81 MG/1
81 TABLET ORAL 2 TIMES DAILY
Qty: 84 TABLET | Refills: 0 | Status: SHIPPED | OUTPATIENT
Start: 2024-11-07 | End: 2024-12-19

## 2024-11-07 RX ORDER — ONDANSETRON 2 MG/ML
4 INJECTION INTRAMUSCULAR; INTRAVENOUS ONCE AS NEEDED
Status: DISCONTINUED | OUTPATIENT
Start: 2024-11-07 | End: 2024-11-07 | Stop reason: HOSPADM

## 2024-11-07 RX ORDER — FENTANYL CITRATE/PF 50 MCG/ML
25 SYRINGE (ML) INJECTION
Status: DISCONTINUED | OUTPATIENT
Start: 2024-11-07 | End: 2024-11-07 | Stop reason: HOSPADM

## 2024-11-07 RX ORDER — CHLORHEXIDINE GLUCONATE ORAL RINSE 1.2 MG/ML
15 SOLUTION DENTAL ONCE
Status: COMPLETED | OUTPATIENT
Start: 2024-11-07 | End: 2024-11-07

## 2024-11-07 RX ORDER — DEXAMETHASONE SODIUM PHOSPHATE 10 MG/ML
INJECTION, SOLUTION INTRAMUSCULAR; INTRAVENOUS AS NEEDED
Status: DISCONTINUED | OUTPATIENT
Start: 2024-11-07 | End: 2024-11-07

## 2024-11-07 RX ORDER — HYDROMORPHONE HCL/PF 1 MG/ML
0.2 SYRINGE (ML) INJECTION
Status: DISCONTINUED | OUTPATIENT
Start: 2024-11-07 | End: 2024-11-07 | Stop reason: HOSPADM

## 2024-11-07 RX ORDER — OXYCODONE HYDROCHLORIDE 5 MG/1
5 TABLET ORAL ONCE AS NEEDED
Status: DISCONTINUED | OUTPATIENT
Start: 2024-11-07 | End: 2024-11-07 | Stop reason: HOSPADM

## 2024-11-07 RX ORDER — CHLORHEXIDINE GLUCONATE 40 MG/ML
SOLUTION TOPICAL DAILY PRN
Status: DISCONTINUED | OUTPATIENT
Start: 2024-11-07 | End: 2024-11-07 | Stop reason: HOSPADM

## 2024-11-07 RX ORDER — ONDANSETRON 4 MG/1
4 TABLET, FILM COATED ORAL EVERY 8 HOURS PRN
Qty: 10 TABLET | Refills: 0 | Status: SHIPPED | OUTPATIENT
Start: 2024-11-07

## 2024-11-07 RX ORDER — CALCIUM CARBONATE 500 MG/1
2 TABLET, CHEWABLE ORAL DAILY
COMMUNITY

## 2024-11-07 RX ADMIN — FENTANYL CITRATE 50 MCG: 50 INJECTION INTRAMUSCULAR; INTRAVENOUS at 10:04

## 2024-11-07 RX ADMIN — ONDANSETRON 4 MG: 2 INJECTION INTRAMUSCULAR; INTRAVENOUS at 10:39

## 2024-11-07 RX ADMIN — FENTANYL CITRATE 50 MCG: 50 INJECTION INTRAMUSCULAR; INTRAVENOUS at 08:31

## 2024-11-07 RX ADMIN — CEFAZOLIN SODIUM 2000 MG: 2 SOLUTION INTRAVENOUS at 09:56

## 2024-11-07 RX ADMIN — DEXAMETHASONE SODIUM PHOSPHATE 4 MG: 10 INJECTION INTRAMUSCULAR; INTRAVENOUS at 10:02

## 2024-11-07 RX ADMIN — BUPIVACAINE HYDROCHLORIDE 10 ML: 5 INJECTION, SOLUTION EPIDURAL; INTRACAUDAL at 08:33

## 2024-11-07 RX ADMIN — PROPOFOL 150 MG: 10 INJECTION, EMULSION INTRAVENOUS at 09:53

## 2024-11-07 RX ADMIN — CHLORHEXIDINE GLUCONATE 15 ML: 1.2 RINSE ORAL at 08:14

## 2024-11-07 RX ADMIN — LIDOCAINE HYDROCHLORIDE 50 MG: 10 INJECTION, SOLUTION EPIDURAL; INFILTRATION; INTRACAUDAL; PERINEURAL at 09:53

## 2024-11-07 RX ADMIN — BUPIVACAINE 10 ML: 13.3 INJECTION, SUSPENSION, LIPOSOMAL INFILTRATION at 08:33

## 2024-11-07 RX ADMIN — SODIUM CHLORIDE, SODIUM LACTATE, POTASSIUM CHLORIDE, AND CALCIUM CHLORIDE: .6; .31; .03; .02 INJECTION, SOLUTION INTRAVENOUS at 09:50

## 2024-11-07 NOTE — OP NOTE
OPERATIVE REPORT  PATIENT NAME: Rachelle Narayanan    :  1945  MRN: [id number]  Pt Location: [facility] OR [facility]OM 06    SURGERY DATE: 2024    Surgeons and Role:     * James R Lachman, MD - Primary  LEONARD Hyatt- assisting  Norberto Aleman MD- assisting resident      Preop Diagnosis:  Arthritis of left midfoot [M19.072]    Post-Op Diagnosis Codes:     * Arthritis of left midfoot [M19.072]    Procedure(s):  Left - Left 2nd and 3rd TMT joint arthrodeses    Specimen(s):  * No specimens in log *    Estimated Blood Loss:   Minimal    Drains:  * No LDAs found *    Anesthesia Type:   Choice    Operative Indications:  Arthritis of left midfoot [M19.072]      Operative Findings:  Consistent with diagnosis      Complications:   None    Procedure and Technique:  Left 2nd TMT arthrodesis  Left 3rd TMT arthrodesis  Allograft bone grafting of arthrodesis sites  Placement into short leg nonweightbearing plaster splint  Fluoroscopy without benefit of radiologist    The patient was met in the preoperative holding area.  Dr. Lachman reviewed the procedure with the patient, reviewed the consent form, and marked the correct extremity.  The patient was then taken to the operating room and the nursing staff reviewed the patient indentification and operative consent form.  The patient was then placed in supine position on the operating room table and a seatbelt was placed.  All bony prominences were well padded.       AA prescrub with chlorhexidine and alcohol was performed. The Left lower extremity was then prepped and draped in sterile fashion.  A timeout was performed by Dr. Lachman that identified the correct patient, correct procedure, correct extremity, the fact that antibiotics were given within one hour prior to the incision and that the correct instruments were available to proceed with the case.  The operative team introduced themselves if any new members were present.     An esmarch was used to exsanguinate the  left leg and used as a tourniquet.  See anesthesia documentation for tourniquet time.   Attention was turned to dorsal midfoot foot and a standard linear incision in line with the lateral border of the 1st TMT was made.  Sharp dissection was taken through the skin and bovie electrocautery was used to control hemostasis.  Scissor dissection was used for the deep dissection using caution to protect the superficial peroneal nerve branches traversing the surgical field.  The interval between EHL and EHB tendons were identified and the deep neurovascular bundle was identified, mobilized and retracted.       We continued dissection over the cuneiforms and identified the 2nd and 3rd TMT joints. Using a rongeur, we removed the osteophytes protruding dorsally.  We used a Hintermann distractor to expose the 2nd TMT joint. We used the wooden handle periosteal elevator to remove the cartilage in these joints. We then irrigated thoroughly and then fenestrated the subchondral plate with a 2.0mm drill bit destroying the subchondral bone to encourage a healing surface. We then placed I-frontdesk Bio4 bone graft into the arthrodesis site.  Next we did the same thing to the 3rd TMT joint first removing the cartilage then drilling to prepare the bony surface for healing.  We placed bone graft into the 3rd TMT joint as well.  Next, we pinned the joints and confirmed reduction on fluoroscopy.  We then drilled a placed a 4.0mm headless compression screw to stabilize the reduction.     We confirmed the reduction on fluoroscopy.  Next we sized a bridging dorsal plate from the middle cuneiform to the 2nd metatarsal and then another bridge plate from the lateral cuneiform to the 3rd metatarsal.  We confirmed plate position and maintained midfoot reduction on fluoroscopy and then filled the plates with appropriate sized screws. We used the remaining bone graft and packed it into the arthrodesis sites.     Fluoroscopy was used to confirm  alignment.  Dr. Lachman independently reviewed and assessed all fluoroscopic images.  The wound was copiously irrigated. Vancomycin powder, 500mg, was used in the wound bed.  The tourniquet was released and all bleeding was controlled using the electrocautery.     All incisions were closed with a combination of 2-0 vicryl for the deep tissues, 4-0 vicryl for subcutaneous tissue, and 3-0 nylon for the skin.  A well padded splint was placed.  All toes were pink and warm at the end of the case.   I was present for the entire procedure     I was present for the entire procedure.    Patient Disposition:  PACU              SIGNATURE: James R Lachman, MD  DATE: November 7, 2024  TIME: 9:34 AM

## 2024-11-07 NOTE — ANESTHESIA PROCEDURE NOTES
Peripheral Block    Patient location during procedure: holding area  Start time: 11/7/2024 8:33 AM  Reason for block: at surgeon's request and post-op pain management  Staffing  Performed by: Lencho Mccann MD  Authorized by: Lencho Mccann MD    Preanesthetic Checklist  Completed: patient identified, IV checked, site marked, risks and benefits discussed, surgical consent, monitors and equipment checked, pre-op evaluation and timeout performed  Peripheral Block  Patient position: right lateral  Prep: ChloraPrep  Patient monitoring: frequent blood pressure checks, continuous pulse oximetry and heart rate  Block type: Popliteal  Laterality: left  Injection technique: single-shot  Procedures: ultrasound guided, Ultrasound guidance required for the procedure to increase accuracy and safety of medication placement and decrease risk of complications.  Ultrasound permanent image saved    Needle  Needle type: Stimuplex   Needle gauge: 20 G  Needle length: 4 in  Needle localization: anatomical landmarks and ultrasound guidance  Assessment  Injection assessment: incremental injection, frequent aspiration, injected with ease, negative aspiration, negative for heart rate change, no paresthesia on injection, no symptoms of intraneural/intravenous injection and needle tip visualized at all times  Paresthesia pain: none  Post-procedure:  site cleaned  patient tolerated the procedure well with no immediate complications

## 2024-11-07 NOTE — ANESTHESIA POSTPROCEDURE EVALUATION
Post-Op Assessment Note    CV Status:  Stable  Pain Score: 2    Pain management: adequate       Mental Status:  Alert and awake   Hydration Status:  Euvolemic   PONV Controlled:  Controlled   Airway Patency:  Patent     Post Op Vitals Reviewed: Yes    No anethesia notable event occurred.    Staff: CRNA           Last Filed PACU Vitals:  Vitals Value Taken Time   Temp     Pulse     BP     Resp     SpO2         Modified Gaby:  No data recorded

## 2024-11-07 NOTE — ANESTHESIA POSTPROCEDURE EVALUATION
Post-Op Assessment Note    Last Filed PACU Vitals:  Vitals Value Taken Time   Temp 96.5 °F (35.8 °C) 11/07/24 1115   Pulse 71 11/07/24 1116   /66 11/07/24 1115   Resp 30 11/07/24 1116   SpO2 98 % 11/07/24 1116   Vitals shown include unfiled device data.    Modified Gaby:  Activity: 2 (11/7/2024 11:15 AM)  Respiration: 2 (11/7/2024 11:15 AM)  Circulation: 2 (11/7/2024 11:15 AM)  Consciousness: 2 (11/7/2024 11:15 AM)  Oxygen Saturation: 2 (11/7/2024 11:15 AM)  Modified Gaby Score: 10 (11/7/2024 11:15 AM)

## 2024-11-07 NOTE — INTERVAL H&P NOTE
H&P reviewed. After examining the patient I find no changes in the patients condition since the H&P had been written.    Vitals:    11/07/24 0811   BP: 130/68   Pulse: 74   Resp: 16   Temp: 98.5 °F (36.9 °C)   SpO2: 96%     Left 2nd and 3rd TMT arthrodesis

## 2024-11-07 NOTE — ANESTHESIA PREPROCEDURE EVALUATION
Procedure:  Left 2nd and 3rd TMT joint arthrodeses (Left: Foot)    Relevant Problems   ANESTHESIA   (-) History of anesthesia complications      CARDIO   (+) Benign essential hypertension   (+) Mitral valve prolapse   (-) Chest pain   (-) MARAVILLA (dyspnea on exertion)      ENDO   (+) Hyperparathyroidism (HCC)   (+) Parathyroid adenoma      GI/HEPATIC   (-) Gastroesophageal reflux disease      MUSCULOSKELETAL   (+) Arthritis of left midfoot      PULMONARY   (-) Shortness of breath   (-) Sleep apnea   (-) URI (upper respiratory infection)      Surgery/Wound/Pain   (+) S/P MVR (mitral valve repair)      •  Left Ventricle: Systolic function is normal with an ejection fraction   of 61-65%. There is grade I (mild) diastolic dysfunction and normal left   atrial pressure.   •  Aortic Valve: There is mild regurgitation.   •  Tricuspid Valve: There is trace regurgitation.   Physical Exam    Airway    Mallampati score: II  TM Distance: >3 FB  Neck ROM: full     Dental        Cardiovascular      Pulmonary      Other Findings  post-pubertal.      Anesthesia Plan  ASA Score- 2     Anesthesia Type- general with ASA Monitors.         Additional Monitors:     Airway Plan: LMA.           Plan Factors-Exercise tolerance (METS): >4 METS.    Chart reviewed. EKG reviewed.  Existing labs reviewed. Patient summary reviewed.                  Induction- intravenous.    Postoperative Plan-         Informed Consent- Anesthetic plan and risks discussed with patient.  I personally reviewed this patient with the CRNA. Discussed and agreed on the Anesthesia Plan with the CRNA..

## 2024-11-07 NOTE — DISCHARGE INSTR - AVS FIRST PAGE
James R Lachman, M.D.  Attending, Orthopaedic Surgery  [facility]  [place] Office Phone: [phone] ? Fax: [phone]  [place] Office Phone: [phone] ? Fax:[phone]    : [address] [place]    Surgery Coordinators [place]: Phyllis Toribio, [phone]  Ramila Vick, [phone]  Surgery Coordinator [place]:  Casey Vyas, [phone]                                                        Adele Emanuel, [phone]                                                                                                                      [email or web address]   POST-OPERATIVE INSTRUCTIONS    General Information:  Typical post operative visits are at the following intervals:  2-3 weeks post surgery, 6 weeks post surgery, 3 months post surgery, 6 months post surgery, and then on a yearly basis.  However, this may change based on Dr. Lachmans’ recommendation.  #1 post-operative rule for foot/ankle surgery:  ONCE YOU ARE OUT OF YOUR CAST AND/OR REMOVABLE BOOT, SWELLING MAY PERSIST FOR MANY MONTHS.  YOU MIGHT ALSO EXPERIENCE A BLUISH DISCOLORATION OF YOUR LEG.  THIS IS NORMAL AND PART OF THE USUAL POSTOPERATIVE EXPERIENCE.  DO NOT WAIT UNTIL YOUR BLOCK WEARS OFF TO TAKE YOUR PAIN MEDICATION.  IT TAKES A FEW DOSES OF THE PAIN MEDICATION TO REACH A THERAPEUTIC LEVEL.  TAKE A TABLET PROACTIVELY BEFORE YOU HAVE ANY PAIN AND AGAIN 4 HOURS LATER SO WHEN THE BLOCK WEARS OFF, YOU ARE NOT CAUGHT OFF GUARD.    SMOKING:  Smoking results in incomplete healing of fractures (broken bones) and joints that my have been fused.  Smoking and nicotine also prevents the growth of bone into ankle replacements and bone healing.  It also slows the healing of muscles and skin (soft tissue).  Therefore, please do not have surgery if you continue to smoke.  We reserve the right to cancel your surgery if we suspect that you are smoking.  DO NOT use  nicorette gum or other patches.  Please find an alternative method to quit smoking before your surgery and do not restart after surgery to allow for healing.      THREE RULES:    After surgery you will most likely be given the instructions “KEEP YOUR TOES ABOVE YOUR NOSE.”  This means that you MUST have your feet elevated higher than your heart.  Keeping your toes above your nose helps to heal the muscles and skin (soft tissues) by reducing swelling in your leg.  This position also helps to prevent infection, and is very important in avoiding deep venous thrombosis (blood clots).    In order to keep the blood circulating in your legs and in order to avoid deep vein   thrombosis (blood clots), we ask patients to GET UP ONCE AN HOUR during the day.  This means you should at least cross the room and come back.  It does not mean you have to be up for long periods of time.  In most cases we will not have people immediately put any weight on their operated part.  This is important to prevent loosening of metal or other devices holding the bones together.  It also prevents irritation of the soft tissues which can lead to prolonged healing.  When we say get up once an hour, please walk, hop or move with an assisted device.  This is important!    Do not do any excessive walking during the first few days after surgery.  Recovering from surgery is a full-time task for the patient.  Postoperative care is important to avoid irritating the skin incision, which can lead to infection.  Please do not plan activities or go out of town for several weeks after surgery.        AFTER YOUR SURGERY:  Bleeding through the bandage almost always occurs.  Do not let this alarm you.  Simply overwrap with an ABD pad and Ace bandage (The nurses discharging your from the day of surgery will provide this.)   If you think it is excessive, you can come in early for a dressing change (at around 1 week instead of 3 weeks postop.)    Do not get the  bandage wet.  Showering is possible with plastic protectors.   Be very careful, as the bathroom can be wet and slippery.  If you do get your dressing wet, it should be changed immediately.  Please contact us.      ONCE YOUR ARE OUT OF YOUR CAST AND/OR REMOVABLE BOOT, SWELLING MAY PERSIST FOR MANY MONTHS.  THERE WILL ALSO BE A BLUISH DISCOLORATION OF YOUR LEG FOR MONTHS.  THIS IS NORMAL AND PART OF THE USUAL POSTOPERATIVE EXPERIENCE.  WEARING COMPRESSION HOSE (ELASTIC STOCKINGS) CAN HELP AVOID SOME OF THIS SWELLING.    Ice the area 20 minutes every hour once the nerve block wears off. If you are in a cast or a splint, you may need to leave the ice on longer than 20 minutes in order to feel any benefits.       DRESSING:   The purpose of the surgical dressing is to keep your wound and the surgical site protected from the environment.  Most dressings contain splints, which help to hold your foot and ankle in a corrected position, and also allow the surgical site to heal properly. IF YOU GO TO THE EMERGENCY ROOM FOR ANY REASON, AND THEY REMOVE YOUR DRESSING OR SPLINT, YOU MUST BE SEEN IN DR. LACHMANS CLINIC TO HAVE IT REPLACED) AS SOON AS POSSIBLE (IDEALLY THE NEXT DAY).   If you have a drain in place, this will need to be removed in 1 day after surgery.  The time for the drain to be pulled will be written on your discharge instruction sheet.    CAST  INSTRUCTIONS:  You may or may not get a cast following surgery.  If you do, pay close attention to the following:    After application of a splint or cast, it is very important to elevate your leg for 24 to 72 hours.   The injured area should be elevated well above the heart.   Remember “Toes above your Nose”.  Rest and elevation greatly reduce pain and speed the healing process by minimizing early swelling.    CALL YOUR DOCTORS OFFICE OR VISIT LOCATION EMERGENCY ROOM IF YOU HAVE ANY OF THE FOLLOWING:    Significant increased pain, which may be caused by swelling (Strict  elevation will alleviate this)  Numbness and tingling in your hand or foot, which may be caused by too much pressure on the nerves (There is always numbness after surgery due to nerve blocks)  Burning and stinging, which may be caused by too much pressure on the skin  Excessive swelling below the cast, which may mean the cast is slowing your blood circulation  Loss of active movement of toes, which request an urgent evaluation (if you have had a nerve block, this is an expected thing and totally normal)  Loss of “capillary refill”.  Pinch the tip of toes and maryam the skin.  Release pressure and if the skin does not return pink then call the office immediately.      DO NOT GET YOUR CAST WET.   Bacteria thrive in moist dark areas.  We do not want this.   If your cast becomes wet, return to the office and we will apply another one.    PAIN AFTER SURGERY:  Narcotic pain medication can and will depress your respiratory system if taken in excess.  The goal of pain management with narcotics is to be comfortable not pain free.  If you take enough narcotics to be pain free then you run the risk of stopping breathing.  If this happens, call 911 immediately!  Pain in the heel is often  caused by pressure from the weight of your foot on the bed.  Make sure your heel is suspended off the bed by keeping a pillow underneath your calf not your knee.    Medications:  You will be given narcotic pain medication. Do NOT drive while taking narcotic medications. Medications such as Darvocet, Percocet, Vicoden or Tylenol #3, also contain acetaminophen (Tylenol). Do not take acetaminophen or Tylenol from home when taking theses medications. When you fill your prescription, you may ask the pharmacist if your pain medication has acetaminophen/Tylenol in it. It is okay to take Tylenol with Oxycontin/Oxycodone.   Unless you are allergic to aspirin or currently taking a blood thinner, Dr. Lachmans’ patients are requested to take one 81 mg  aspirin every 12 hours until you are back to walking normally after surgery (This can be up to 6 weeks). Ecotrin (Enteric-coated aspirin) is more sensitive to the stomach and we recommend purchasing this instead of regular aspirin to minimize the risk of stomach irritation.  Narcotic medications commonly cause nausea. Taking them with food will decrease this side effect. If you are having extreme nausea, please contact us for an alternative medication or for something that can be taken with this medication to decrease the nausea.   Also, narcotic medications frequently cause constipation. An increase of fiber, fruits and vegetables in your diet may alleviate this problem, or if necessary, you may use an over-the-counter medication such as senekot, colace, or Fibercon for constipation problems.   You should resume all medications you were taking prior to the surgery unless otherwise specified.   If you had fracture surgery, bony surgery like an osteotomy or fusion, or a surgery that requires bone healing, you are advised to take Vitamin D and Calcium to improve healing potential.  Vitamin D3 4000 units/day and Calcium 1200mg/day. These are over the counter medications so please pick them up at the pharmacy when you are picking up your prescriptions.    Activity:   Because of your recent foot surgery, your activity level will decrease. You will need to elevate your foot ABOVE the level of your heart for a minimum of four days. The length of time necessary for the swelling to go down, and for your wounds to heal properly depends greatly on your efforts here. Elevation is extremely important to avoid compromising the blood supply to your foot. Remember when your foot is down it will swell, which will increase pain and slow healing. Wiggle your toes frequently if possible.   If you go home with a regional block, (a type of anesthesia) the foot and leg will be numb. Think of ways to get into your house and around the  house until the block wears off.   Keep in mind that it may be a legal issue if you drive while in a cast or splint, especially when the splint is on the right foot. You may call the Department of Motor Vehicles to schedule a road test if you have adaptive equipment applied to your car.   The amount of weight you are allowed to bear on your foot will be written on your discharge sheet filled out at the time of surgery. The following is an explanation of the possibilities:     Non-weight bearing:   You are to put NO weight whatsoever on your foot. When using crutches or a walker, your foot should not touch the ground, except when you are standing. Then, it may rest on the ground. If you are to be non-weight bearing, and you are not compliant, you could compromise the surgery.     Some of our patients have been requesting prescriptions for a roll-a-bout knee scooter. [employer] and other insurances have been denying these claims, and you may either have to rent one or pay out of pocket to purchase one.

## 2024-11-07 NOTE — TELEPHONE ENCOUNTER
Caller: Pre op nurse     Doctor: Lachman     Reason for call: States pt's daughter Angela mentioned her mother should have gotten a walker at her last appt with Dr. Lachman but did not. Would like to know if she can  a walker in the ortho office.     Please advise     Call back#: Angela [phone]

## 2024-11-27 ENCOUNTER — OFFICE VISIT (OUTPATIENT)
Dept: OBGYN CLINIC | Facility: CLINIC | Age: 79
End: 2024-11-27

## 2024-11-27 VITALS — BODY MASS INDEX: 33.01 KG/M2 | HEIGHT: 57 IN | WEIGHT: 153 LBS

## 2024-11-27 DIAGNOSIS — M19.072 ARTHRITIS OF LEFT MIDFOOT: Primary | ICD-10-CM

## 2024-11-27 PROCEDURE — 99024 POSTOP FOLLOW-UP VISIT: CPT | Performed by: ORTHOPAEDIC SURGERY

## 2024-11-27 NOTE — PROGRESS NOTES
"      James R Lachman, M.D.  Attending, Orthopaedic Surgery  Foot and Ankle  [facility]      ORTHOPAEDIC FOOT AND ANKLE POST-OP VISIT     Procedure:      Left 2nd and 3rd TMT joint arthrodesis        Date of surgery:   11/7/2024      PLAN  1. Weightbearing Status - NWB operative extremity  2. DVT prophylaxis - ASA 81 mg BID  3. Begin PT/HEP as directed  4. Pain control - OTC pain medication  5. RTC in 3 week(s)  6. Xrays needed next visit - Yes weightbearing foot    History of Present Illness:   Chief Complaint:   Chief Complaint   Patient presents with    Post-op     Post op 3 weeks. Splint off and stiches out.   Left 2nd and 3rd TMT joint arthrodeses - Left       Rachelle Narayanan is a 79 y.o. female who is being seen for 3 week post-operative visit for the above procedure. Pain is well controlled and the patient has successfully transitioned to OTC pain medicines.  she is taking ASA 81 mg BID for DVT prophylaxis. Patient has been NWB in a Splint      Review of Systems:  General- denies fever/chills  Respiratory- denies cough or SOB  Cardio- denies chest pain or palpitations  GI- denies abdominal pain  Musculoskeletal- Negative except noted above  Skin- denies rashes or wounds    Physical Exam:   Ht 4' 9\" (1.448 m)   Wt 69.4 kg (153 lb)   BMI 33.11 kg/m²   General/Constitutional: No apparent distress: well-nourished and well developed.  Eyes: normal ocular motion  Lymphatic: No appreciable lymphadenopathy  Respiratory: Non-labored breathing  Vascular: No edema, swelling or tenderness, except as noted in detailed exam.  Integumentary: No impressive skin lesions present, except as noted in detailed exam.  Neuro: No ataxia or tremors noted  Psych: Normal mood and affect, oriented to person, place and time. Appropriate affect.  Musculoskeletal: Normal, except as noted in detailed exam and in HPI.    Examination    left        Incision Clean, dry, intact  Sutures Removed this visit  "   Ecchymosis mild    Swelling mild    Sensation Intact to light touch throughout sural, saphenous, superficial peroneal, deep peroneal and medial/lateral plantar nerve distributions.  Vass-Elizabeth 5.07 filament (10g) testing deferred.    Cardiovascular Brisk capillary refill < 2 seconds,intact DP and PT pulses    Special Tests None      Imaging Studies:   No new images      Scribe Attestation      I,:  Jaydon Devynla nena am acting as a scribe while in the presence of the attending physician.:       I,:  James R Lachman, MD personally performed the services described in this documentation    as scribed in my presence.:              James R. Lachman, MD  Foot & Ankle Surgery   Department of Orthopaedic Surgery  [facility]      I personally performed the service.    James R. Lachman, MD

## 2024-11-27 NOTE — PATIENT INSTRUCTIONS
"Nonweightbearing     Continue aspirin/lovenox to prevent blood clots  Purchase a compression stocking (Knee high, 20-30mm Hg) to be worn at all times while awake for your next visit when the cast comes off.  Recommend taking the following supplements: Vitamin D3- 4000 units per day and Calcium 1200 mg per day. This will help with bone healing.    Care of Casts and Splints    Casts and splints support and protect injured bones and soft tissue. When you break a bone, your doctor will put the pieces back together in the right position. Casts and splints hold the bones in place while they heal. They also reduce pain, swelling, and muscle spasm.    In some cases, splints and casts are applied following surgery.    Splints or \"half-casts\" provide less support than casts. However, splints can be adjusted to accommodate swelling from injuries easier than enclosed casts. Your doctor will decide which type of support is best for you.    Types of Splints and Casts  Casts are custom-made. They must fit the shape of your injured limb correctly to provide the best support. Casts can be made of plaster or fiberglass -- a plastic that can be shaped.  Splints or half-casts can also be custom-made, especially if an exact fit is necessary. Other times, a ready-made splint will be used. These off-the-shelf splints are made in a variety of shapes and sizes, and are much easier and faster to use. They have Velcro straps which make the splints easy to put on, take off, and adjust.    Materials  Fiberglass or plaster materials form the hard supportive layer in splints and casts.  Fiberglass is lighter in weight and stronger than plaster. In addition, x-rays can \"see through\" fiberglass better than through plaster. This is important because your doctor will probably schedule additional x-rays after your splint or cast has been applied. X-rays can show whether the bones are healing well or have moved out of place.  Plaster is less expensive " "than fiberglass and shapes better than fiberglass for some uses.    Application  Both fiberglass and plaster splints and casts use padding, usually cotton, as a protective layer next to the skin. Both materials come in strips or rolls which are dipped in water and applied over the padding covering the injured area.  The splint or cast must fit the shape of the injured arm or leg correctly to provide the best possible support. Generally, the splint or cast also covers the joint above and below the broken bone.  In many cases, a splint is applied to a fresh injury first. As swelling subsides, a full cast may replace the splint.  Sometimes, it may be necessary to replace a cast as swelling goes down and the cast gets \"too big.\" As a fracture heals, the cast may be replaced by a splint to make it easier to perform physical therapy exercises.        Apply ice to the splint or cast and elevate your leg to reduce swelling.  Warning Signs  Swelling can create a lot of pressure under your cast. This can lead to problems. If you experience any of the following symptoms, contact your doctor's office immediately for advice.  Increased pain and the feeling that the splint or cast is too tight. This may be caused by swelling.   Numbness and tingling in your hand or foot. This may be caused by too much pressure on the nerves.   Burning and stinging. This may be caused by too much pressure on the skin.   Excessive swelling below the cast. This may mean the cast is slowing your blood circulation.   Loss of active movement of toes or fingers. This requires an urgent evaluation by your doctor.      Getting Used to a Splint or Cast  Swelling due to your injury may cause pressure in your splint or cast for the first 48 to 72 hours. This may cause your injured arm or leg to feel snug or tight in the splint or cast. If you have a splint, your doctor will show you how to adjust it to accommodate the swelling.  It is very important to keep " "the swelling down. This will lessen pain and help your injury heal. To help reduce swelling:  Elevate. It is very important to elevate your injured arm or leg for the first 24 to 72 hours. Prop your injured arm or leg up above your heart by putting it on pillows or some other support. You will have to recline if the splint or cast is on your leg. Elevation allows clear fluid and blood to drain \"downhill\" to your heart.   Exercise. Move your uninjured, but swollen fingers or toes gently and often. Moving them often will prevent stiffness.   Ice. Apply ice to the splint or cast. Place the ice in a dry plastic bag or ice pack and loosely wrap it around the splint or cast at the level of the injury. Ice that is packed in a rigid container and touches the cast at only one point will not be effective.  Taking Care of Your Splint or Cast  Your doctor will explain any restrictions on using your injured arm or leg while it is healing. You must follow your doctor's instructions carefully to make sure your bone heals properly. The following information provides general guidelines only, and is not a substitute for your doctor's advice.  After you have adjusted to your splint or cast for a few days, it is important to keep it in good condition. This will help your recovery.  Keep your splint or cast dry. Moisture weakens plaster and damp padding next to the skin can cause irritation. Use two layers of plastic or purchase waterproof shields to keep your splint or cast dry while you shower or bathe. Even if the cast is covered, do not submerge it or hold it under running water. A small pinhole in the cast cover can cause the injury to get soaked.   Walking casts. Do not walk on a \"walking cast\" until it is completely dry and hard. It takes about one hour for fiberglass, and two to three days for plaster to become hard enough to walk on.   Avoid dirt. Keep dirt, sand, and powder away from the inside of your splint or cast. " "  Padding. Do not pull out the padding from your splint or cast.   Itching. Do not stick objects such as coat hangers inside the splint or cast to scratch itching skin. Do not apply powders or deodorants to itching skin. If itching persists, contact your doctor.   Trimming. Do not break off rough edges of the cast or trim the cast before asking your doctor.   Skin. Inspect the skin around the cast. If your skin becomes red or raw around the cast, contact your doctor.   Inspect the cast regularly. If it becomes cracked or develops soft spots, contact your doctor's office.  Use common sense. You have a serious injury and you must protect your cast from damage so it can protect your injury while it heals.  After the initial swelling has subsided, proper splint or cast support will usually allow you to continue your daily activities with a minimum of inconvenience.  Cast Removal  Never remove the cast yourself. You may cut your skin or prevent proper healing of your injury.  Your doctor will use a cast saw to remove your cast. The saw vibrates, but does not rotate. If the blade of the saw touches the padding inside the hard shell of the cast, the padding will vibrate with the blade and will protect your skin. Cast saws make noise and may feel \"hot\" from friction, but will not harm you -- \"their bark is worse than their bite.\"  If you do feel pain while the cast is being removed, let your doctor or an assistant know and they will be able to make adjustments.  The saw vibrates but does not rotate. Cast saws make noise but will not harm you.      "

## 2024-12-18 ENCOUNTER — OFFICE VISIT (OUTPATIENT)
Dept: OBGYN CLINIC | Facility: CLINIC | Age: 79
End: 2024-12-18

## 2024-12-18 ENCOUNTER — APPOINTMENT (OUTPATIENT)
Dept: RADIOLOGY | Facility: AMBULARY SURGERY CENTER | Age: 79
End: 2024-12-18
Attending: ORTHOPAEDIC SURGERY
Payer: COMMERCIAL

## 2024-12-18 VITALS
DIASTOLIC BLOOD PRESSURE: 67 MMHG | HEART RATE: 67 BPM | BODY MASS INDEX: 33.01 KG/M2 | WEIGHT: 153 LBS | SYSTOLIC BLOOD PRESSURE: 126 MMHG | HEIGHT: 57 IN

## 2024-12-18 DIAGNOSIS — M19.072 ARTHRITIS OF LEFT MIDFOOT: Primary | ICD-10-CM

## 2024-12-18 DIAGNOSIS — Z01.89 ENCOUNTER FOR LOWER EXTREMITY COMPARISON IMAGING STUDY: ICD-10-CM

## 2024-12-18 DIAGNOSIS — M19.072 ARTHRITIS OF LEFT MIDFOOT: ICD-10-CM

## 2024-12-18 PROCEDURE — 73630 X-RAY EXAM OF FOOT: CPT

## 2024-12-18 PROCEDURE — 99024 POSTOP FOLLOW-UP VISIT: CPT | Performed by: ORTHOPAEDIC SURGERY

## 2024-12-18 PROCEDURE — 73620 X-RAY EXAM OF FOOT: CPT

## 2024-12-18 NOTE — PATIENT INSTRUCTIONS
Then 4 weeks of weightbearing as tolerated in the CAM boot.    You may begin weaning your boot and transitioning to a sneaker (1/13). It is important to do this gradually to avoid aggravating the healing process.    1/13, you may come out of the boot into a sneaker for 2 hours.  2. 1/14, you may come out of the boot into a sneaker for 4 hours,  3. The next day, you may come out of the boot into a sneaker for 6 hours.  4. Continue this (adding 2 hours per day) as you tolerate. For example, if you do 6 hours out of the boot into a sneaker and your foot swells more than usual at night and it is difficult to control the discomfort, do not advance to 8 hours the next day, stay at 6 hours until you are able to tolerate it.    It is essential to follow this protocol and not modify this.  No matter when you begin weaning the boot, it will be difficult and there will be swelling and soreness.  If you spend more time than is recommended in the boot, this process becomes longer and more painful.    Elevation, Ice and tylenol and staying off of it at night will be important to aide in this transition out of the boot. Swelling and soreness are normal as you begin to do more with the injured leg.    May DC aspirin/lovenox, no longer needed.  May shower  Begin PT  Scar massage- pea sized amount of lotion, massage into scar for 5 minutes each day.  Compression stocking (Knee high, 20-30mm Hg) to be worn at all times while awake.  Recommend taking the following supplements: Vitamin D3- 4000 units per day and Calcium 1200 mg per day. This will help with bone healing.

## 2024-12-18 NOTE — PROGRESS NOTES
"      James R Lachman, M.D.  Attending, Orthopaedic Surgery  Foot and Ankle  [facility]      ORTHOPAEDIC FOOT AND ANKLE POST-OP VISIT     Procedure:      Left 2nd and 3rd TMT joint arthrodesis       Date of surgery:   11/7/24      PLAN  1. Weightbearing Status - PWB operative extremity with progression to WBAT  2. DVT prophylaxis - Completed  3. Begin PT/HEP as directed  4. Pain control - OTC pain medication  5. RTC in 6 weeks  6. Xrays needed next visit - Yes weightbearing foot    History of Present Illness:   Chief Complaint:   Chief Complaint   Patient presents with    Post-op     Post op 6 weeks. Cast off.   Left 2nd and 3rd TMT joint arthrodeses - Left       Rachelle Narayanan is a 79 y.o. female who is being seen for 6 week post-operative visit for the above procedure. Pain is well controlled and the patient has successfully transitioned to OTC pain medicines.  she is taking ASA 325mg BID for DVT prophylaxis. Patient has been NWB in a Short leg cast      Review of Systems:  General- denies fever/chills  Respiratory- denies cough or SOB  Cardio- denies chest pain or palpitations  GI- denies abdominal pain  Musculoskeletal- Negative except noted above  Skin- denies rashes or wounds    Physical Exam:   /67   Pulse 67   Ht 4' 9\" (1.448 m)   Wt 69.4 kg (153 lb)   BMI 33.11 kg/m²   General/Constitutional: No apparent distress: well-nourished and well developed.  Eyes: normal ocular motion  Lymphatic: No appreciable lymphadenopathy  Respiratory: Non-labored breathing  Vascular: No edema, swelling or tenderness, except as noted in detailed exam.  Integumentary: No impressive skin lesions present, except as noted in detailed exam.  Neuro: No ataxia or tremors noted  Psych: Normal mood and affect, oriented to person, place and time. Appropriate affect.  Musculoskeletal: Normal, except as noted in detailed exam and in HPI.    Examination    left        Incision Clean, dry, intact  Sutures " Previously removed.    Ecchymosis none    Swelling mild    Sensation Intact to light touch throughout sural, saphenous, superficial peroneal, deep peroneal and medial/lateral plantar nerve distributions.  [place] 5.07 filament (10g) testing deferred.    Cardiovascular Brisk capillary refill < 2 seconds,intact DP and PT pulses    Special Tests None      Imaging Studies:   3 views of the left foot were taken, reviewed and interpreted independently that demonstrate hardware in expected position without signs of failure or loosening. Reviewed by me personally.      Scribe Attestation      I,:  Jaydon Corado am acting as a scribe while in the presence of the attending physician.:       I,:  James R Lachman, MD personally performed the services described in this documentation    as scribed in my presence.:                James R. Lachman, MD  Foot & Ankle Surgery   Department of Orthopaedic Surgery  [facility]      I personally performed the service.    James R. Lachman, MD

## 2024-12-24 ENCOUNTER — EVALUATION (OUTPATIENT)
Dept: PHYSICAL THERAPY | Facility: CLINIC | Age: 79
End: 2024-12-24
Payer: COMMERCIAL

## 2024-12-24 DIAGNOSIS — Z51.89 AFTERCARE: ICD-10-CM

## 2024-12-24 DIAGNOSIS — M19.072 ARTHRITIS OF LEFT MIDFOOT: Primary | ICD-10-CM

## 2024-12-24 PROCEDURE — 97112 NEUROMUSCULAR REEDUCATION: CPT | Performed by: PHYSICAL THERAPIST

## 2024-12-24 PROCEDURE — 97161 PT EVAL LOW COMPLEX 20 MIN: CPT | Performed by: PHYSICAL THERAPIST

## 2024-12-24 PROCEDURE — 97110 THERAPEUTIC EXERCISES: CPT | Performed by: PHYSICAL THERAPIST

## 2024-12-24 NOTE — PROGRESS NOTES
PT Evaluation     Today's date: 2024  Patient name: Rachelle Narayanan  : 1945  MRN: [id number]  Referring provider: Lachman, James R, MD  Dx:   Encounter Diagnosis     ICD-10-CM    1. Arthritis of left midfoot  M19.072 Ambulatory Referral to Physical Therapy      2. Aftercare  Z51.89                      Assessment  Impairments: abnormal gait, abnormal or restricted ROM, activity intolerance, impaired balance, impaired physical strength, lacks appropriate home exercise program, pain with function, weight-bearing intolerance, poor body mechanics, activity limitations and endurance    Assessment details: Pt presents with signs and symptoms synonymous of admitting diagnosis of post 7 week 2nd and 3rd TMT arthrodesis.  Pt presents with pain that is well controlled, decreased strength, decreased range, flexibility, as well as tolerance to activity and gait dysfunction.  She is a fall risk per objective and subjective reporting.  Pt would benefit skilled PT intervention in order to address these impairments in order to be able to perform all desired activities with minimal to nil symptom exacerbation.  Thank you very much for this kind and motivated referral.     Understanding of Dx/Px/POC: good     Prognosis: good    Goals  STG 4 Weeks:  Decrease pain at worst to 4  Improve range to improve DF and Gtoe by 5  Improve strength to 4-/5 LE   Independent with HEP  MTG 8 Weeks:  Decrease pain at worst to 2  Improve range to DF and gtoe to equal that of R LE.    Improve strength to 4+ LE.    Independent with HEP  MTG 12 Weeks:  Decrease pain at worst to 1  Improve range to sustain in CKC based interventions  Improve strength to LE 5/5.  Independent with HEP  LTG 16 Weeks:  Assess as needed.       Plan  Patient would benefit from: skilled physical therapy  Planned modality interventions: cryotherapy and thermotherapy: hydrocollator packs    Planned therapy interventions: abdominal trunk stabilization, activity  modification, manual therapy, joint mobilization, neuromuscular re-education, patient/caregiver education, postural training, strengthening, stretching, therapeutic activities, therapeutic exercise, therapeutic training, transfer training, IADL retraining, home exercise program, graded motor, graded exercise, graded activity, gait training, functional ROM exercises, flexibility, behavior modification, body mechanics training and balance    Frequency: 2x week  Duration in weeks: 10  Treatment plan discussed with: patient        Subjective Evaluation    History of Present Illness  Date of onset: 2024  Mechanism of injury: Pt is a 79 yofemale who presents today s/p L arthrodesis of 2nd and 3rd TMT performed by Dr. Lachman on 24 secondary to developmental lumps in the foot, she needed to buy bigger shoes to accommodate it, she reports that she changed the way she walked was having hip and back pain due to this and she eventually was referred to Dr. Lachman and they both were in accord to have the surgical procedure.  Pt reports that she is now almost 7 weeks out from surgery, she has begun WB in the CAM walker.  Pt reports that she is doing okay, there is a base line annoyance 1-2/10 and then with standing, walking and mobilizing the symptoms can get up to a 6/10.  Pt reports she has noticed that her endurance has improved and standing is getting easier.  Pt reports that swelling is still as restricting her sensation.  Pt reports that she is very active and desires to get back to being mobile, active with her family and capable to take care of her home and yard.  Pt reports back to Dr. Lachman on 25.  Denies any recent change in bowel or bladder.  Has RW but not currently using at this time.    Quality of life: good    Patient Goals  Patient goals for therapy: increased strength, return to sport/leisure activities, increased motion, improved balance and decreased pain    Pain  Current pain ratin  At  "best pain ratin  At worst pain ratin  Quality: dull ache, sharp and tight  Relieving factors: ice, change in position, rest and medications  Aggravating factors: standing, walking, stair climbing and lifting  Progression: improved    Social Support  Steps to enter house: yes  Stairs in house: yes   Lives in: multiple-level home and one-story house  Lives with: spouse (Carlos is .)      Diagnostic Tests  X-ray: normal        Objective     Active Range of Motion   Left Ankle/Foot   Dorsiflexion (ke): -5 degrees   Plantar flexion: 45 degrees   Inversion: 20 degrees   Eversion: 5 degrees   Great toe extension: 50 degrees     Right Ankle/Foot   Dorsiflexion (ke): 0 degrees   Plantar flexion: 60 degrees   Inversion: 35 degrees   Eversion: 25 degrees   Great toe extension: 70 degrees     Additional Active Range of Motion Details  Forward head, rounded shoulders, Lordosis  Antalgia with L > R.    B/L Sensation intact to L3,4,5,S1,S2  LE Strength  Hip   L Flex 4 Ext 5 Abd 5 Add 5  R Flex 5 Ext 5 Abd 5 Add 5  LE Screen  Knee strong and painless b/l.   Ankle   L DF 3 Inv/ Ever 3 Gtoe 3  R DF Ever Inv Gtoe all 5  Joint Mobility  Palpation  Sts  Incision clean dry and intact  TUG: 15.85\"  no AD        Flowsheet Rows      Flowsheet Row Most Recent Value   PT/OT G-Codes    Current Score 40   Projected Score 61               Precautions: Falls Hx, HTN, CHF with OHS  Osteoporosis, PWB L LE in CAM with eventual progression to WBAT,  2nd and 3rd TMT Joint Arthrodesis performed by Dr. Lachman on 24.    Note from 24  Then 4 weeks of weightbearing as tolerated in the CAM boot.     You may begin weaning your boot and transitioning to a sneaker (). It is important to do this gradually to avoid aggravating the healing process.     , you may come out of the boot into a sneaker for 2 hours.  2. , you may come out of the boot into a sneaker for 4 hours,  3. The next day, you may come out of the boot into " "a sneaker for 6 hours.  4. Continue this (adding 2 hours per day) as you tolerate. For example, if you do 6 hours out of the boot into a sneaker and your foot swells more than usual at night and it is difficult to control the discomfort, do not advance to 8 hours the next day, stay at 6 hours until you are able to tolerate it.            Manuals 12/24            Gentle PROM L foot                                                    Neuro Re-Ed 5 min education and progression            NBOS EC             NBOS EO Foam             WS AP/ML 3\" x 15            AP ML Walking             Hip Abd + Ext                                       Ther Ex             APs 30x            LAQ  2 x 10            SLR Flex 2 x 10            Gastroc ST with Strap 20\"x 4             Towel scrunch 30 reps                                                   Ther Activity             Eventual sit<>Stand             Eventual Squat             Gait training Nil                                       Modalities             CP prn.                                "

## 2024-12-31 ENCOUNTER — OFFICE VISIT (OUTPATIENT)
Dept: PHYSICAL THERAPY | Facility: CLINIC | Age: 79
End: 2024-12-31
Payer: COMMERCIAL

## 2024-12-31 DIAGNOSIS — Z51.89 AFTERCARE: ICD-10-CM

## 2024-12-31 DIAGNOSIS — M19.072 ARTHRITIS OF LEFT MIDFOOT: Primary | ICD-10-CM

## 2024-12-31 PROCEDURE — 97112 NEUROMUSCULAR REEDUCATION: CPT

## 2024-12-31 PROCEDURE — 97110 THERAPEUTIC EXERCISES: CPT

## 2024-12-31 NOTE — PROGRESS NOTES
"Daily Note     Today's date: 2024  Patient name: Rachelle Narayanan  : 1945  MRN: [id number]  Referring provider: Lachman, James R, MD  Dx:   Encounter Diagnosis     ICD-10-CM    1. Arthritis of left midfoot  M19.072       2. Aftercare  Z51.89           Start Time: 0900  Stop Time: 09  Total time in clinic (min): 33 minutes    Subjective: Pt reports that she has been performing HEP and her foot is gradually improving.      Objective: See treatment diary below      Assessment: Treatment today was focused on LLE strength, lengthening L hamstrings and gastroc, and improving balance. Added HS + gastroc stretch to HEP. All treatments tolerated well, no adverse reaction.      Plan: Continue per plan of care.      Precautions: Falls Hx, HTN, CHF with OHS  Osteoporosis, PWB L LE in CAM with eventual progression to WBAT,  2nd and 3rd TMT Joint Arthrodesis performed by Dr. Lachman on 24.    Note from 24  Then 4 weeks of weightbearing as tolerated in the CAM boot.     You may begin weaning your boot and transitioning to a sneaker (). It is important to do this gradually to avoid aggravating the healing process.     , you may come out of the boot into a sneaker for 2 hours.  2. , you may come out of the boot into a sneaker for 4 hours,  3. The next day, you may come out of the boot into a sneaker for 6 hours.  4. Continue this (adding 2 hours per day) as you tolerate. For example, if you do 6 hours out of the boot into a sneaker and your foot swells more than usual at night and it is difficult to control the discomfort, do not advance to 8 hours the next day, stay at 6 hours until you are able to tolerate it.            Manuals            Gentle PROM L foot  MBP                                                  Neuro Re-Ed 5 min education and progression            NBOS EC             NBOS EO Foam  x5min           WS AP/ML 3\" x 15 3\" x20           AP ML Walking             Hip " "Abd + Ext             SL hip abd  2x10 L                        Ther Ex             APs 30x            LAQ  2 x 10            SLR Flex 2 x 10 2 x 10           Gastroc ST with Strap 20\"x 4             Towel scrunch 30 reps            Seated HS + gastroc stretch w/ strap  [id number]                                     Ther Activity             Eventual sit<>Stand             Eventual Squat             Gait training Nil                                       Modalities             CP prn.                                "

## 2025-01-06 ENCOUNTER — APPOINTMENT (OUTPATIENT)
Dept: PHYSICAL THERAPY | Facility: CLINIC | Age: 80
End: 2025-01-06
Payer: COMMERCIAL

## 2025-01-13 ENCOUNTER — OFFICE VISIT (OUTPATIENT)
Dept: PHYSICAL THERAPY | Facility: CLINIC | Age: 80
End: 2025-01-13
Payer: COMMERCIAL

## 2025-01-13 DIAGNOSIS — M19.072 ARTHRITIS OF LEFT MIDFOOT: Primary | ICD-10-CM

## 2025-01-13 DIAGNOSIS — Z51.89 AFTERCARE: ICD-10-CM

## 2025-01-13 PROCEDURE — 97112 NEUROMUSCULAR REEDUCATION: CPT

## 2025-01-13 PROCEDURE — 97110 THERAPEUTIC EXERCISES: CPT

## 2025-01-13 NOTE — PROGRESS NOTES
Daily Note     Today's date: 2025  Patient name: Rachelle Narayanan  : 1945  MRN: [id number]  Referring provider: Lachman, James R, MD  Dx:   Encounter Diagnosis     ICD-10-CM    1. Arthritis of left midfoot  M19.072       2. Aftercare  Z51.89           Start Time: 1100  Stop Time: 1135  Total time in clinic (min): 35 minutes    Subjective: Pt reports that she did not bring a sneaker today and wanted to spend 2 hours in a sneaker at home today. Denies any pain or swelling to start.       Objective: See treatment diary below    Requested pt bring a sneaker to her next visit, and reviewed weaning protocol.     Assessment: Tolerated treatment well. CAM boot worn during today's session. No issues with WB in CAM.       Plan: Continue per plan of care.      Precautions: Falls Hx, HTN, CHF with OHS  Osteoporosis, PWB L LE in CAM with eventual progression to WBAT,  2nd and 3rd TMT Joint Arthrodesis performed by Dr. Lachman on 24.    Note from 24  Then 4 weeks of weightbearing as tolerated in the CAM boot.     You may begin weaning your boot and transitioning to a sneaker (). It is important to do this gradually to avoid aggravating the healing process.     , you may come out of the boot into a sneaker for 2 hours.  2. , you may come out of the boot into a sneaker for 4 hours,  3. The next day, you may come out of the boot into a sneaker for 6 hours.  4. Continue this (adding 2 hours per day) as you tolerate. For example, if you do 6 hours out of the boot into a sneaker and your foot swells more than usual at night and it is difficult to control the discomfort, do not advance to 8 hours the next day, stay at 6 hours until you are able to tolerate it.            Manuals           Gentle PROM L foot  MBP LNK                                                 Neuro Re-Ed 5 min education and progression            NBOS EC   1 min           NBOS EO Foam  x5min           WS  "AP/ML 3\" x 15 3\" x20 5\"x20           AP ML Walking   5 laps           Hip Abd + Ext   2x10           SL hip abd  2x10 L                        Ther Ex             APs 30x  20          LAQ  2 x 10  2x10 3#          SLR Flex 2 x 10 2 x 10 2x10           Gastroc ST with Strap 20\"x 4   20\"x4           Towel scrunch 30 reps  20 reps           Seated HS + gastroc stretch w/ strap  [id number]                                     Ther Activity             Eventual sit<>Stand             Eventual Squat   2x10           Gait training Nil                                       Modalities             CP prn.                                "

## 2025-01-14 ENCOUNTER — RA CDI HCC (OUTPATIENT)
Dept: OTHER | Facility: HOSPITAL | Age: 80
End: 2025-01-14

## 2025-01-14 NOTE — PROGRESS NOTES
I13.0 , N18.30  HCC coding opportunities          Chart Reviewed number of suggestions sent to Provider: 2     Patients Insurance     Medicare Insurance: [employer]vantage

## 2025-01-16 ENCOUNTER — APPOINTMENT (OUTPATIENT)
Dept: PHYSICAL THERAPY | Facility: CLINIC | Age: 80
End: 2025-01-16
Payer: COMMERCIAL

## 2025-01-20 ENCOUNTER — APPOINTMENT (OUTPATIENT)
Dept: PHYSICAL THERAPY | Facility: CLINIC | Age: 80
End: 2025-01-20
Payer: COMMERCIAL

## 2025-01-22 ENCOUNTER — OFFICE VISIT (OUTPATIENT)
Dept: FAMILY MEDICINE CLINIC | Facility: CLINIC | Age: 80
End: 2025-01-22
Payer: COMMERCIAL

## 2025-01-22 VITALS
DIASTOLIC BLOOD PRESSURE: 70 MMHG | SYSTOLIC BLOOD PRESSURE: 116 MMHG | RESPIRATION RATE: 14 BRPM | TEMPERATURE: 98 F | HEIGHT: 57 IN | BODY MASS INDEX: 28.05 KG/M2 | OXYGEN SATURATION: 99 % | HEART RATE: 74 BPM | WEIGHT: 130 LBS

## 2025-01-22 DIAGNOSIS — I34.1 MITRAL VALVE PROLAPSE: ICD-10-CM

## 2025-01-22 DIAGNOSIS — Z98.890 S/P MVR (MITRAL VALVE REPAIR): ICD-10-CM

## 2025-01-22 DIAGNOSIS — E04.1 THYROID NODULE: Primary | ICD-10-CM

## 2025-01-22 DIAGNOSIS — M81.8 IDIOPATHIC OSTEOPOROSIS: ICD-10-CM

## 2025-01-22 DIAGNOSIS — R79.89 ELEVATED PARATHYROID HORMONE: ICD-10-CM

## 2025-01-22 DIAGNOSIS — E78.5 DYSLIPIDEMIA: ICD-10-CM

## 2025-01-22 DIAGNOSIS — I10 PRIMARY HYPERTENSION: ICD-10-CM

## 2025-01-22 PROBLEM — E21.3 HYPERPARATHYROIDISM (HCC): Status: RESOLVED | Noted: 2024-08-09 | Resolved: 2025-01-22

## 2025-01-22 PROBLEM — I50.32 CHRONIC HEART FAILURE WITH PRESERVED EJECTION FRACTION (HCC): Status: RESOLVED | Noted: 2023-07-14 | Resolved: 2025-01-22

## 2025-01-22 LAB
ANION GAP SERPL CALCULATED.3IONS-SCNC: 8 MMOL/L (ref 4–13)
BUN SERPL-MCNC: 28 MG/DL (ref 5–25)
CALCIUM SERPL-MCNC: 9.3 MG/DL (ref 8.4–10.2)
CHLORIDE SERPL-SCNC: 110 MMOL/L (ref 96–108)
CO2 SERPL-SCNC: 24 MMOL/L (ref 21–32)
CREAT SERPL-MCNC: 1.28 MG/DL (ref 0.6–1.3)
GFR SERPL CREATININE-BSD FRML MDRD: 40 ML/MIN/1.73SQ M
GLUCOSE P FAST SERPL-MCNC: 105 MG/DL (ref 65–99)
POTASSIUM SERPL-SCNC: 4.4 MMOL/L (ref 3.5–5.3)
SL AMB POCT HEMOGLOBIN AIC: 5.8 (ref ?–6.5)
SODIUM SERPL-SCNC: 142 MMOL/L (ref 135–147)

## 2025-01-22 PROCEDURE — 36415 COLL VENOUS BLD VENIPUNCTURE: CPT | Performed by: INTERNAL MEDICINE

## 2025-01-22 PROCEDURE — 83036 HEMOGLOBIN GLYCOSYLATED A1C: CPT | Performed by: INTERNAL MEDICINE

## 2025-01-22 PROCEDURE — 99214 OFFICE O/P EST MOD 30 MIN: CPT | Performed by: INTERNAL MEDICINE

## 2025-01-22 PROCEDURE — 80048 BASIC METABOLIC PNL TOTAL CA: CPT | Performed by: INTERNAL MEDICINE

## 2025-01-22 RX ORDER — TORSEMIDE 10 MG/1
10 TABLET ORAL DAILY
Qty: 90 TABLET | Refills: 3 | Status: SHIPPED | OUTPATIENT
Start: 2025-01-22

## 2025-01-22 RX ORDER — LOSARTAN POTASSIUM 50 MG/1
50 TABLET ORAL EVERY MORNING
Qty: 100 TABLET | Refills: 1 | Status: SHIPPED | OUTPATIENT
Start: 2025-01-22

## 2025-01-22 RX ORDER — ATORVASTATIN CALCIUM 20 MG/1
20 TABLET, FILM COATED ORAL DAILY
Qty: 100 TABLET | Refills: 3 | Status: SHIPPED | OUTPATIENT
Start: 2025-01-22

## 2025-01-22 RX ORDER — METOPROLOL SUCCINATE 50 MG/1
50 TABLET, EXTENDED RELEASE ORAL DAILY
Qty: 90 TABLET | Refills: 3 | Status: SHIPPED | OUTPATIENT
Start: 2025-01-22

## 2025-01-22 NOTE — ASSESSMENT & PLAN NOTE
Orders:    metoprolol succinate (TOPROL-XL) 50 mg 24 hr tablet; Take 1 tablet (50 mg total) by mouth daily In the evening daily    torsemide (DEMADEX) 10 mg tablet; Take 1 tablet (10 mg total) by mouth daily    POCT hemoglobin A1c    UA (URINE) with reflex to Scope; Future    Magnesium; Future    PTH, Intact and Calcium; Future    Vitamin B12; Future    Vitamin D 25 hydroxy; Future    CBC and differential; Future    TSH, 3rd generation; Future    T4, free; Future    Comprehensive metabolic panel; Future    Lipid panel; Future    Basic metabolic panel; Future

## 2025-01-22 NOTE — PROGRESS NOTES
Name: Rachelle Narayanan      : 1945      MRN: [id number]  Encounter Provider: Evangelist Shankar MD  Encounter Date: 2025   Encounter department: [facility] CARE [facility]  :  Assessment & Plan  Dyslipidemia    Orders:    atorvastatin (LIPITOR) 20 mg tablet; Take 1 tablet (20 mg total) by mouth daily    Basic metabolic panel; Future    Idiopathic osteoporosis    Orders:    denosumab (PROLIA) 60 mg/mL; Inject 1 mL (60 mg total) under the skin once for 1 dose    POCT hemoglobin A1c    UA (URINE) with reflex to Scope; Future    Magnesium; Future    PTH, Intact and Calcium; Future    Vitamin B12; Future    Vitamin D 25 hydroxy; Future    CBC and differential; Future    TSH, 3rd generation; Future    T4, free; Future    Comprehensive metabolic panel; Future    Lipid panel; Future    Basic metabolic panel; Future    Primary hypertension    Orders:    losartan (COZAAR) 50 mg tablet; Take 1 tablet (50 mg total) by mouth every morning    S/P MVR (mitral valve repair)    Orders:    metoprolol succinate (TOPROL-XL) 50 mg 24 hr tablet; Take 1 tablet (50 mg total) by mouth daily In the evening daily    torsemide (DEMADEX) 10 mg tablet; Take 1 tablet (10 mg total) by mouth daily    POCT hemoglobin A1c    UA (URINE) with reflex to Scope; Future    Magnesium; Future    PTH, Intact and Calcium; Future    Vitamin B12; Future    Vitamin D 25 hydroxy; Future    CBC and differential; Future    TSH, 3rd generation; Future    T4, free; Future    Comprehensive metabolic panel; Future    Lipid panel; Future    Basic metabolic panel; Future    Mitral valve prolapse    Orders:    torsemide (DEMADEX) 10 mg tablet; Take 1 tablet (10 mg total) by mouth daily    POCT hemoglobin A1c    UA (URINE) with reflex to Scope; Future    Magnesium; Future    PTH, Intact and Calcium; Future    Vitamin B12; Future    Vitamin D 25 hydroxy; Future    CBC and differential; Future    TSH, 3rd generation; Future    T4, free; Future    Pt's insurance doesn't cover Pantoprazole 40 mg  could you do new Rx for Omeprazole to [facility]   Topic Date Due    HIV screen  05/14/1986    Cervical cancer screen  05/14/1992    Flu vaccine (1) 09/01/2019    Lipid screen  10/26/2023    DTaP/Tdap/Td vaccine (3 - Td) 05/07/2029    Pneumococcal 0-64 years Vaccine  Completed             (applicable per patient's age: Cancer Screenings, Depression Screening, Fall Risk Screening, Immunizations)    LDL Cholesterol (mg/dL)   Date Value   10/26/2018 136 (H)     AST (U/L)   Date Value   10/26/2018 20     ALT (U/L)   Date Value   10/26/2018 15     BUN (mg/dL)   Date Value   10/26/2018 13      (goal A1C is < 7)   (goal LDL is <100) need 30-50% reduction from baseline     BP Readings from Last 3 Encounters:   07/29/19 93/68   06/28/19 120/78   05/28/19 127/60    (goal /80)      All Future Testing planned in [facility]:  Lab Frequency Next Occurrence   Comprehensive Metabolic Panel Once 01/41/1534   Lipid Panel Once 08/30/2019       Next Visit Date:  Future Appointments   Date Time Provider Gustavo Pierson   10/25/2019  3:15 PM Marie Mejia MD [facility] [address] [facility]            Patient Active Problem List:     Depression with anxiety     GERD (gastroesophageal reflux disease)     Insomnia     Mixed hypercholesterolemia and hypertriglyceridemia     Smoker  Comprehensive metabolic panel; Future    Lipid panel; Future    Thyroid nodule    Orders:    POCT hemoglobin A1c    UA (URINE) with reflex to Scope; Future    Magnesium; Future    PTH, Intact and Calcium; Future    Vitamin B12; Future    Vitamin D 25 hydroxy; Future    CBC and differential; Future    TSH, 3rd generation; Future    T4, free; Future    Comprehensive metabolic panel; Future    Lipid panel; Future    Elevated parathyroid hormone    Orders:    POCT hemoglobin A1c    UA (URINE) with reflex to Scope; Future    Magnesium; Future    PTH, Intact and Calcium; Future    Vitamin B12; Future    Vitamin D 25 hydroxy; Future    CBC and differential; Future    TSH, 3rd generation; Future    T4, free; Future    Comprehensive metabolic panel; Future    Lipid panel; Future    BMI 28.0-28.9,adult    Orders:    POCT hemoglobin A1c    UA (URINE) with reflex to Scope; Future    Magnesium; Future    PTH, Intact and Calcium; Future    Vitamin B12; Future    Vitamin D 25 hydroxy; Future    CBC and differential; Future    TSH, 3rd generation; Future    T4, free; Future    Comprehensive metabolic panel; Future    Lipid panel; Future    Life style Mod  FU w P.T./O.T.  Juan w Orthopedic  RTC in 3 mos w  Blood work       History of Present Illness   79 Y O lady is here for Regular check Up, she feels a Lot better, recent blood work and med list reviewed..      Review of Systems   Constitutional:  Negative for chills, fatigue and fever.   HENT:  Negative for congestion, ear pain, facial swelling, sore throat, trouble swallowing and voice change.    Eyes:  Negative for pain, discharge and visual disturbance.   Respiratory:  Negative for cough, shortness of breath and wheezing.    Cardiovascular:  Negative for chest pain, palpitations and leg swelling.   Gastrointestinal:  Negative for abdominal pain, blood in stool, constipation, diarrhea, nausea and vomiting.   Endocrine: Negative for polydipsia, polyphagia and polyuria.  "  Genitourinary:  Negative for difficulty urinating, dysuria, hematuria and urgency.   Musculoskeletal:  Negative for arthralgias, back pain and myalgias.   Skin:  Negative for color change and rash.   Neurological:  Negative for dizziness, tremors, seizures, syncope, weakness and headaches.   Hematological:  Negative for adenopathy. Does not bruise/bleed easily.   Psychiatric/Behavioral:  Negative for dysphoric mood, sleep disturbance and suicidal ideas.    All other systems reviewed and are negative.      Objective   /70 (BP Location: Left arm, Patient Position: Sitting, Cuff Size: Standard)   Pulse 74   Temp 98 °F (36.7 °C) (Tympanic Core)   Resp 14   Ht 4' 9\" (1.448 m)   Wt 59 kg (130 lb)   SpO2 99%   BMI 28.13 kg/m²      Physical Exam  Vitals and nursing note reviewed.   Constitutional:       General: She is not in acute distress.     Appearance: She is well-developed. She is not diaphoretic.   HENT:      Head: Normocephalic and atraumatic.      Right Ear: External ear normal.      Left Ear: External ear normal.      Nose: Nose normal.   Eyes:      General:         Right eye: No discharge.         Left eye: No discharge.      Conjunctiva/sclera: Conjunctivae normal.      Pupils: Pupils are equal, round, and reactive to light.   Neck:      Thyroid: No thyromegaly.      Trachea: No tracheal deviation.   Cardiovascular:      Rate and Rhythm: Normal rate and regular rhythm.      Heart sounds: Murmur heard.      No friction rub.   Pulmonary:      Effort: Pulmonary effort is normal. No respiratory distress.      Breath sounds: Normal breath sounds. No stridor. No wheezing or rales.   Abdominal:      General: Bowel sounds are normal. There is no distension.      Palpations: Abdomen is soft.      Tenderness: There is no abdominal tenderness. There is no guarding.   Musculoskeletal:         General: Tenderness present. No swelling or deformity. Normal range of motion.      Cervical back: Normal range of " motion and neck supple.   Lymphadenopathy:      Cervical: No cervical adenopathy.   Skin:     General: Skin is warm and dry.      Capillary Refill: Capillary refill takes less than 2 seconds.      Coloration: Skin is not pale.      Findings: No erythema or rash.   Neurological:      Mental Status: She is alert and oriented to person, place, and time.      Cranial Nerves: No cranial nerve deficit.      Coordination: Coordination normal.   Psychiatric:         Mood and Affect: Mood normal.         Behavior: Behavior normal.

## 2025-01-22 NOTE — ASSESSMENT & PLAN NOTE
Orders:    atorvastatin (LIPITOR) 20 mg tablet; Take 1 tablet (20 mg total) by mouth daily    Basic metabolic panel; Future

## 2025-01-22 NOTE — PATIENT INSTRUCTIONS

## 2025-01-22 NOTE — ASSESSMENT & PLAN NOTE
Orders:    torsemide (DEMADEX) 10 mg tablet; Take 1 tablet (10 mg total) by mouth daily    POCT hemoglobin A1c    UA (URINE) with reflex to Scope; Future    Magnesium; Future    PTH, Intact and Calcium; Future    Vitamin B12; Future    Vitamin D 25 hydroxy; Future    CBC and differential; Future    TSH, 3rd generation; Future    T4, free; Future    Comprehensive metabolic panel; Future    Lipid panel; Future

## 2025-01-23 ENCOUNTER — EVALUATION (OUTPATIENT)
Dept: PHYSICAL THERAPY | Facility: CLINIC | Age: 80
End: 2025-01-23
Payer: COMMERCIAL

## 2025-01-23 DIAGNOSIS — M19.072 ARTHRITIS OF LEFT MIDFOOT: Primary | ICD-10-CM

## 2025-01-23 DIAGNOSIS — Z51.89 AFTERCARE: ICD-10-CM

## 2025-01-23 PROCEDURE — 97110 THERAPEUTIC EXERCISES: CPT | Performed by: PHYSICAL THERAPIST

## 2025-01-23 PROCEDURE — 97112 NEUROMUSCULAR REEDUCATION: CPT | Performed by: PHYSICAL THERAPIST

## 2025-01-23 PROCEDURE — 97140 MANUAL THERAPY 1/> REGIONS: CPT | Performed by: PHYSICAL THERAPIST

## 2025-01-23 NOTE — PROGRESS NOTES
PT Evaluation     Today's date: 2025  Patient name: Rachelle Narayanan  : 1945  MRN: [id number]  Referring provider: Lachman, James R, MD  Dx:   Encounter Diagnosis     ICD-10-CM    1. Arthritis of left midfoot  M19.072       2. Aftercare  Z51.89                        Assessment  Impairments: abnormal gait, abnormal or restricted ROM, activity intolerance, impaired balance, impaired physical strength, lacks appropriate home exercise program, pain with function, weight-bearing intolerance, poor body mechanics, activity limitations and endurance    Assessment details: Pt is progressing well at this time.  They have demonstrated improvement in pain levels, strength, mobility and endurance.  They have achieved all STGs sought out for them as well as by them.  They will benefit continued Skilled PT intervention in order to achieve all LTGs sought out for them as well as by them in order to perform all desired activities with minimal to nil symptom exacerbation.  Thank you very much for this kind and motivated referral.        Understanding of Dx/Px/POC: good     Prognosis: good    Goals  STG 4 Weeks:  Decrease pain at worst to 4 -met  Improve range to improve DF and Gtoe by 5 -met  Improve strength to 4-/5 LE  -met  Independent with HEP -met  MTG 8 Weeks:  Decrease pain at worst to 2  Improve range to DF and gtoe to equal that of R LE.    Improve strength to 4+ LE.    Independent with HEP  MTG 12 Weeks:  Decrease pain at worst to 1  Improve range to sustain in CKC based interventions  Improve strength to LE 5/5.  Independent with HEP  LTG 16 Weeks:  Assess as needed.       Plan  Patient would benefit from: skilled physical therapy  Planned modality interventions: cryotherapy and thermotherapy: hydrocollator packs    Planned therapy interventions: abdominal trunk stabilization, activity modification, manual therapy, joint mobilization, neuromuscular re-education, patient/caregiver education, postural  training, strengthening, stretching, therapeutic activities, therapeutic exercise, therapeutic training, transfer training, IADL retraining, home exercise program, graded motor, graded exercise, graded activity, gait training, functional ROM exercises, flexibility, behavior modification, body mechanics training and balance    Frequency: 2x week  Duration in weeks: 10  Treatment plan discussed with: patient        Subjective Evaluation    History of Present Illness  Date of onset: 2024  Mechanism of injury: Pt presents today stating that she is officially out of CAM walker, she has been feeling quite well as a whole.  Pain levels fluctuate nil to 3-4, at worst is first thing in the AM and then right at the end of the day.  There is some swelling at the end of the day as well and is almost entirely gone when she wakes up.  Intermittent numbness in the AM, but gone once being mobile.   Quite content with overall progression.  Pt reports not utilizing AD at this time.  Follows up with Dr. Lachman on 25.  Stairs are also improving.  Goals at this time are to further reduce pain, improve quality of walking as well as endurance.   Quality of life: good    Patient Goals  Patient goals for therapy: increased strength, return to sport/leisure activities, increased motion, improved balance and decreased pain    Pain  Current pain ratin  At best pain ratin  At worst pain ratin  Quality: dull ache, sharp and tight  Relieving factors: ice, change in position, rest and medications  Aggravating factors: standing, walking, stair climbing and lifting  Progression: improved    Social Support  Steps to enter house: yes  Stairs in house: yes   Lives in: multiple-level home and one-story house  Lives with: spouse (Carlos is .)      Diagnostic Tests  X-ray: normal        Objective     Active Range of Motion   Left Ankle/Foot   Dorsiflexion (ke): 0 degrees   Plantar flexion: 45 degrees   Inversion: 20 degrees  "  Eversion: 10 degrees   Great toe extension: 55 degrees     Right Ankle/Foot   Dorsiflexion (ke): 0 degrees   Plantar flexion: 60 degrees   Inversion: 35 degrees   Eversion: 25 degrees   Great toe extension: 70 degrees     Additional Active Range of Motion Details  Forward head, rounded shoulders, Lordosis  Antalgia with L > R.    B/L Sensation intact to L3,4,5,S1,S2  LE Strength  Hip   L Flex 4+  Ext 5 Abd 5 Add 5  R Flex 5 Ext 5 Abd 5 Add 5  LE Screen  Knee strong and painless b/l.   Ankle   L DF 4- Inv/ Ever 4- Gtoe 4-  R DF Ever Inv Gtoe all 5  Joint Mobility  Palpation  Sts  Incision clean dry and intact  TUG: 15.85\"  no AD // 14\"   Elevations one step up and down at a time R UE support with Rail.                   Precautions: Falls Hx, HTN, CHF with OHS 2021 Osteoporosis, PWB L LE in CAM with eventual progression to WBAT,  2nd and 3rd TMT Joint Arthrodesis performed by Dr. Lachman on 11/7/24.    Note from 12/18/24  Then 4 weeks of weightbearing as tolerated in the CAM boot.     You may begin weaning your boot and transitioning to a sneaker (1/13). It is important to do this gradually to avoid aggravating the healing process.     1/13, you may come out of the boot into a sneaker for 2 hours.  2. 1/14, you may come out of the boot into a sneaker for 4 hours,  3. The next day, you may come out of the boot into a sneaker for 6 hours.  4. Continue this (adding 2 hours per day) as you tolerate. For example, if you do 6 hours out of the boot into a sneaker and your foot swells more than usual at night and it is difficult to control the discomfort, do not advance to 8 hours the next day, stay at 6 hours until you are able to tolerate it.            Manuals 12/24 12/31 01/13 1/23         Gentle PROM L foot  MBP LNK TAS                                                Neuro Re-Ed 5 min education and progression            NBOS EC   1 min  1 min         NBOS EO Foam  x5min           WS AP/ML 3\" x 15 3\" x20 5\"x20  SLS nv " "        AP ML Walking   5 laps  5 laps         Hip Abd + Ext   2x10  nv         SL hip abd  2x10 L                        Ther Ex             APs 30x  20 review         LAQ  2 x 10  2x10 3# nv         SLR Flex 2 x 10 2 x 10 2x10  2 x 10         Gastroc ST with Strap 20\"x 4   20\"x4  20\" x 4         Towel scrunch 30 reps  20 reps  nv         Seated HS + gastroc stretch w/ strap  [id number]                                     Ther Activity             Eventual sit<>Stand             Eventual Squat   2x10  nv         Gait training Nil             Elevations    FF                       Modalities             CP prn.                                "

## 2025-01-27 ENCOUNTER — HOSPITAL ENCOUNTER (OUTPATIENT)
Dept: CT IMAGING | Facility: HOSPITAL | Age: 80
Discharge: HOME | End: 2025-01-27
Attending: SURGERY
Payer: COMMERCIAL

## 2025-01-27 DIAGNOSIS — R91.1 LUNG NODULE SEEN ON IMAGING STUDY: ICD-10-CM

## 2025-01-27 PROCEDURE — 71260 CT THORAX DX C+: CPT

## 2025-01-27 RX ADMIN — IOHEXOL 80 ML: 350 INJECTION, SOLUTION INTRAVENOUS at 08:52

## 2025-01-29 ENCOUNTER — OFFICE VISIT (OUTPATIENT)
Dept: OBGYN CLINIC | Facility: CLINIC | Age: 80
End: 2025-01-29

## 2025-01-29 ENCOUNTER — APPOINTMENT (OUTPATIENT)
Dept: RADIOLOGY | Facility: AMBULARY SURGERY CENTER | Age: 80
End: 2025-01-29
Attending: ORTHOPAEDIC SURGERY
Payer: COMMERCIAL

## 2025-01-29 VITALS — WEIGHT: 130 LBS | BODY MASS INDEX: 28.05 KG/M2 | HEIGHT: 57 IN

## 2025-01-29 DIAGNOSIS — M19.072 ARTHRITIS OF LEFT MIDFOOT: ICD-10-CM

## 2025-01-29 DIAGNOSIS — M19.072 ARTHRITIS OF LEFT MIDFOOT: Primary | ICD-10-CM

## 2025-01-29 DIAGNOSIS — Z01.89 ENCOUNTER FOR LOWER EXTREMITY COMPARISON IMAGING STUDY: ICD-10-CM

## 2025-01-29 PROCEDURE — 73620 X-RAY EXAM OF FOOT: CPT

## 2025-01-29 PROCEDURE — 99024 POSTOP FOLLOW-UP VISIT: CPT | Performed by: ORTHOPAEDIC SURGERY

## 2025-01-29 PROCEDURE — 73630 X-RAY EXAM OF FOOT: CPT

## 2025-01-29 NOTE — PROGRESS NOTES
"      James R Lachman, M.D.  Attending, Orthopaedic Surgery  Foot and Ankle  [facility]      ORTHOPAEDIC FOOT AND ANKLE POST-OP VISIT     Procedure:      Left 2nd and 3rd TMT joint arthrodesis       Date of surgery:   11/7/24      PLAN  1. Weightbearing Status - WBAT operative extremity  2. DVT prophylaxis - Completed  3. Continue PT/HEP as directed  4. Pain control - OTC pain medication  5. RTC in 3 month(s)  6. Xrays needed next visit - Yes weightbearing foot    History of Present Illness:   Chief Complaint:   Chief Complaint   Patient presents with    Post-op     Post op 3 months. Everything going ok. She is still having discomfort.  Getting better every day.   Left 2nd and 3rd TMT joint arthrodeses - Left       Rachelle Narayanan is a 79 y.o. female who is being seen for 3 month post-operative visit for the above procedure. Pain is well controlled and the patient has successfully transitioned to OTC pain medicines.  she has completed ASA 325mg BID for DVT prophylaxis. Patient has been WBAT in a Supportive sneaker      Review of Systems:  General- denies fever/chills  Respiratory- denies cough or SOB  Cardio- denies chest pain or palpitations  GI- denies abdominal pain  Musculoskeletal- Negative except noted above  Skin- denies rashes or wounds    Physical Exam:   Ht 4' 9\" (1.448 m)   Wt 59 kg (130 lb)   BMI 28.13 kg/m²   General/Constitutional: No apparent distress: well-nourished and well developed.  Eyes: normal ocular motion  Lymphatic: No appreciable lymphadenopathy  Respiratory: Non-labored breathing  Vascular: No edema, swelling or tenderness, except as noted in detailed exam.  Integumentary: No impressive skin lesions present, except as noted in detailed exam.  Neuro: No ataxia or tremors noted  Psych: Normal mood and affect, oriented to person, place and time. Appropriate affect.  Musculoskeletal: Normal, except as noted in detailed exam and in HPI.    Examination    left      "   Incision Clean, dry, intact  Sutures Previously removed.    Ecchymosis none    Swelling mild    Sensation Intact to light touch throughout sural, saphenous, superficial peroneal, deep peroneal and medial/lateral plantar nerve distributions.  Casselberry-Elizabeth 5.07 filament (10g) testing deferred.    Cardiovascular Brisk capillary refill < 2 seconds,intact DP and PT pulses    Special Tests None      Imaging Studies:   3 views of the left foot were taken, reviewed and interpreted independently that demonstrate hardware in expected position without signs of failure or loosening. Reviewed by me personally.      Scribe Attestation      I,:  Jaydon Corado am acting as a scribe while in the presence of the attending physician.:       I,:  James R Lachman, MD personally performed the services described in this documentation    as scribed in my presence.:                James R. Lachman, MD  Foot & Ankle Surgery   Department of Orthopaedic Surgery  [facility]      I personally performed the service.    James R. Lachman, MD

## 2025-01-31 ENCOUNTER — OFFICE VISIT (OUTPATIENT)
Dept: PHYSICAL THERAPY | Facility: CLINIC | Age: 80
End: 2025-01-31
Payer: COMMERCIAL

## 2025-01-31 DIAGNOSIS — Z51.89 AFTERCARE: ICD-10-CM

## 2025-01-31 DIAGNOSIS — M19.072 ARTHRITIS OF LEFT MIDFOOT: Primary | ICD-10-CM

## 2025-01-31 PROCEDURE — 97112 NEUROMUSCULAR REEDUCATION: CPT | Performed by: PHYSICAL THERAPIST

## 2025-01-31 PROCEDURE — 97140 MANUAL THERAPY 1/> REGIONS: CPT | Performed by: PHYSICAL THERAPIST

## 2025-01-31 PROCEDURE — 97110 THERAPEUTIC EXERCISES: CPT | Performed by: PHYSICAL THERAPIST

## 2025-01-31 NOTE — PROGRESS NOTES
"Daily Note     Today's date: 2025  Patient name: Rachelle Narayanan  : 1945  MRN: [id number]  Referring provider: Lachman, James R, MD  Dx:   Encounter Diagnosis     ICD-10-CM    1. Arthritis of left midfoot  M19.072       2. Aftercare  Z51.89                      Subjective: Pt presents today stating that she is feeling a bit sore.  Very busy and active over the last few days.  Had follow up with Dr. Lachman and imagery indicated appropriate.        Objective: See treatment diary below      Assessment:  Asymptomatic t/o entire session.  Educated about soreness.  Alternate up and down with decreased power on elevations.  One step at a time, requires railing at this time.       Precautions: Falls Hx, HTN, CHF with OHS  Osteoporosis, PWB L LE in CAM with eventual progression to WBAT,  2nd and 3rd TMT Joint Arthrodesis performed by Dr. Lachman on 24.    Note from 24  Then 4 weeks of weightbearing as tolerated in the CAM boot.     You may begin weaning your boot and transitioning to a sneaker (). It is important to do this gradually to avoid aggravating the healing process.     , you may come out of the boot into a sneaker for 2 hours.  2. , you may come out of the boot into a sneaker for 4 hours,  3. The next day, you may come out of the boot into a sneaker for 6 hours.  4. Continue this (adding 2 hours per day) as you tolerate. For example, if you do 6 hours out of the boot into a sneaker and your foot swells more than usual at night and it is difficult to control the discomfort, do not advance to 8 hours the next day, stay at 6 hours until you are able to tolerate it.            Manuals         Gentle PROM L foot  MBP LNK TAS TAS                                               Neuro Re-Ed 5 min education and progression            NBOS EC   1 min  1 min 1 min Foam        NBOS EO Foam  x5min           WS AP/ML 3\" x 15 3\" x20 5\"x20  SLS nv 15\" x 5      " "  AP ML Walking   5 laps  5 laps 5 laps         Hip Abd + Ext   2x10  nv 2 x 10         SL hip abd  2x10 L                        Ther Ex             APs 30x  20 review         LAQ  2 x 10  2x10 3# nv 2 x 10 3#        SLR Flex 2 x 10 2 x 10 2x10  2 x 10 2 x 10        Gastroc ST with Strap 20\"x 4   20\"x4  20\" x 4 20\" x 4        Towel scrunch 30 reps  20 reps  nv 20 reps        Seated HS + gastroc stretch w/ strap  [id number]                                     Ther Activity             Eventual sit<>Stand             Eventual Squat   2x10  nv 2 x 10        Gait training Nil             Elevations    FF  FF                     Modalities             CP prn.                                     "

## 2025-02-03 ENCOUNTER — OFFICE VISIT (OUTPATIENT)
Dept: PHYSICAL THERAPY | Facility: CLINIC | Age: 80
End: 2025-02-03
Payer: COMMERCIAL

## 2025-02-03 DIAGNOSIS — M19.072 ARTHRITIS OF LEFT MIDFOOT: Primary | ICD-10-CM

## 2025-02-03 DIAGNOSIS — Z51.89 AFTERCARE: ICD-10-CM

## 2025-02-03 PROCEDURE — 97140 MANUAL THERAPY 1/> REGIONS: CPT | Performed by: PHYSICAL THERAPIST

## 2025-02-03 PROCEDURE — 97110 THERAPEUTIC EXERCISES: CPT | Performed by: PHYSICAL THERAPIST

## 2025-02-03 NOTE — PROGRESS NOTES
"Daily Note     Today's date: 2/3/2025  Patient name: Rachelle Narayanan  : 1945  MRN: [id number]  Referring provider: Lachman, James R, MD  Dx:   Encounter Diagnosis     ICD-10-CM    1. Arthritis of left midfoot  M19.072       2. Aftercare  Z51.89                      Subjective: Pt presents today stating that she is mildly sore from working on her home over the weekend.        Objective: See treatment diary below      Assessment:   Further progression with CKC based activity.  Mild fatigue with elevations, only able to proceed with one step at a time going down with b/l UE support.       Precautions: Falls Hx, HTN, CHF with OHS  Osteoporosis, PWB L LE in CAM with eventual progression to WBAT,  2nd and 3rd TMT Joint Arthrodesis performed by Dr. Lachman on 24.    Note from 24  Then 4 weeks of weightbearing as tolerated in the CAM boot.     You may begin weaning your boot and transitioning to a sneaker (). It is important to do this gradually to avoid aggravating the healing process.     , you may come out of the boot into a sneaker for 2 hours.  2. , you may come out of the boot into a sneaker for 4 hours,  3. The next day, you may come out of the boot into a sneaker for 6 hours.  4. Continue this (adding 2 hours per day) as you tolerate. For example, if you do 6 hours out of the boot into a sneaker and your foot swells more than usual at night and it is difficult to control the discomfort, do not advance to 8 hours the next day, stay at 6 hours until you are able to tolerate it.            Manuals 12/24 12/31 01/13 1/23 1/31 2/3       Gentle PROM L foot  MBP LNK TAS TAS TAS                                              Neuro Re-Ed 5 min education and progression            NBOS EC   1 min  1 min 1 min Foam 1 min foam       NBOS EO Foam  x5min           WS AP/ML 3\" x 15 3\" x20 5\"x20  SLS nv 15\" x 5 15\" x 5       AP ML Walking   5 laps  5 laps 5 laps  5 laps       Hip Abd + Ext   2x10  " "nv 2 x 10  2 x 10       SL hip abd  2x10 L                        Ther Ex             APs 30x  20 review  Stand HR/TR 2 x 10       LAQ  2 x 10  2x10 3# nv 2 x 10 3# 2 x 10 3#       SLR Flex 2 x 10 2 x 10 2x10  2 x 10 2 x 10 2 x 10       Gastroc ST with Strap 20\"x 4   20\"x4  20\" x 4 20\" x 4 Wedge 20\" x 4       Towel scrunch 30 reps  20 reps  nv 20 reps 20 reps       Seated HS + gastroc stretch w/ strap  [id number]                                     Ther Activity             Eventual sit<>Stand             Eventual Squat   2x10  nv 2 x 10 2 x 10       Gait training Nil             Elevations    FF  FF FF                    Modalities             CP prn.                                     "

## 2025-02-05 ENCOUNTER — OFFICE VISIT (OUTPATIENT)
Dept: PHYSICAL THERAPY | Facility: CLINIC | Age: 80
End: 2025-02-05
Payer: COMMERCIAL

## 2025-02-05 DIAGNOSIS — M19.072 ARTHRITIS OF LEFT MIDFOOT: Primary | ICD-10-CM

## 2025-02-05 DIAGNOSIS — Z51.89 AFTERCARE: ICD-10-CM

## 2025-02-05 PROCEDURE — 97140 MANUAL THERAPY 1/> REGIONS: CPT | Performed by: PHYSICAL THERAPIST

## 2025-02-05 PROCEDURE — 97110 THERAPEUTIC EXERCISES: CPT | Performed by: PHYSICAL THERAPIST

## 2025-02-05 NOTE — PROGRESS NOTES
"Daily Note     Today's date: 2025  Patient name: Rachelle Narayanan  : 1945  MRN: [id number]  Referring provider: Lachman, James R, MD  Dx:   Encounter Diagnosis     ICD-10-CM    1. Arthritis of left midfoot  M19.072       2. Aftercare  Z51.89                      Subjective: Pt presents today stating that she is feeling well.  Offers no new complaints has recovered from soreness.        Objective: See treatment diary below      Assessment:   Further progression with CKC based activity.  Mild fatigue with elevations, encouraged alternating factor, pt in accord, fair eccentrics with b/l UE.  Continue to progress as able.       Precautions: Falls Hx, HTN, CHF with OHS  Osteoporosis, PWB L LE in CAM with eventual progression to WBAT,  2nd and 3rd TMT Joint Arthrodesis performed by Dr. Lachman on 24.    Note from 24  Then 4 weeks of weightbearing as tolerated in the CAM boot.     You may begin weaning your boot and transitioning to a sneaker (). It is important to do this gradually to avoid aggravating the healing process.     , you may come out of the boot into a sneaker for 2 hours.  2. , you may come out of the boot into a sneaker for 4 hours,  3. The next day, you may come out of the boot into a sneaker for 6 hours.  4. Continue this (adding 2 hours per day) as you tolerate. For example, if you do 6 hours out of the boot into a sneaker and your foot swells more than usual at night and it is difficult to control the discomfort, do not advance to 8 hours the next day, stay at 6 hours until you are able to tolerate it.            Manuals 12/24 12/31 01/13 1/23 1/31 2/3 2      Gentle PROM L foot  MBP LNK TAS TAS TAS TAS                                             Neuro Re-Ed 5 min education and progression            NBOS EC   1 min  1 min 1 min Foam 1 min foam 1 min      NBOS EO Foam  x5min           WS AP/ML 3\" x 15 3\" x20 5\"x20  SLS nv 15\" x 5 15\" x 5 15\" x 5      AP ML Walking " "  5 laps  5 laps 5 laps  5 laps 5 laps      Hip Abd + Ext   2x10  nv 2 x 10  2 x 10 2 x 10       SL hip abd  2x10 L                        Ther Ex             APs 30x  20 review  Stand HR/TR 2 x 10 Stand HR/TR 2 x 10      LAQ  2 x 10  2x10 3# nv 2 x 10 3# 2 x 10 3# 2 x 10 3#      SLR Flex 2 x 10 2 x 10 2x10  2 x 10 2 x 10 2 x 10 2 x 10      Gastroc ST with Strap 20\"x 4   20\"x4  20\" x 4 20\" x 4 Wedge 20\" x 4 Wedge 20\" x 4      Towel scrunch 30 reps  20 reps  nv 20 reps 20 reps 20 reps      Seated HS + gastroc stretch w/ strap  [id number]                                     Ther Activity             Eventual sit<>Stand             Eventual Squat   2x10  nv 2 x 10 2 x 10 2 x 10      Gait training Nil             Elevations    FF  FF FF FF                   Modalities             CP prn.                                     "

## 2025-02-10 ENCOUNTER — APPOINTMENT (OUTPATIENT)
Dept: PHYSICAL THERAPY | Facility: CLINIC | Age: 80
End: 2025-02-10
Payer: COMMERCIAL

## 2025-02-12 ENCOUNTER — OFFICE VISIT (OUTPATIENT)
Dept: PHYSICAL THERAPY | Facility: CLINIC | Age: 80
End: 2025-02-12
Payer: COMMERCIAL

## 2025-02-12 DIAGNOSIS — M19.072 ARTHRITIS OF LEFT MIDFOOT: Primary | ICD-10-CM

## 2025-02-12 DIAGNOSIS — Z51.89 AFTERCARE: ICD-10-CM

## 2025-02-12 PROCEDURE — 97140 MANUAL THERAPY 1/> REGIONS: CPT | Performed by: PHYSICAL THERAPIST

## 2025-02-12 PROCEDURE — 97110 THERAPEUTIC EXERCISES: CPT | Performed by: PHYSICAL THERAPIST

## 2025-02-12 NOTE — PROGRESS NOTES
"Daily Note     Today's date: 2025  Patient name: Rachelle Narayanan  : 1945  MRN: [id number]  Referring provider: Lachman, James R, MD  Dx:   Encounter Diagnosis     ICD-10-CM    1. Arthritis of left midfoot  M19.072       2. Aftercare  Z51.89                      Subjective: Pt presents today stating that she is feeling well, practice elevations and exercises at home.        Objective: See treatment diary below      Assessment:   Improving endurance, improving tolerance to activity, enhanced CKC based activities, able to alternate up and down.  Continue to progress as able.       Precautions: Falls Hx, HTN, CHF with OHS  Osteoporosis, PWB L LE in CAM with eventual progression to WBAT,  2nd and 3rd TMT Joint Arthrodesis performed by Dr. Lachman on 24.    Note from 24  Then 4 weeks of weightbearing as tolerated in the CAM boot.     You may begin weaning your boot and transitioning to a sneaker (). It is important to do this gradually to avoid aggravating the healing process.     , you may come out of the boot into a sneaker for 2 hours.  2. , you may come out of the boot into a sneaker for 4 hours,  3. The next day, you may come out of the boot into a sneaker for 6 hours.  4. Continue this (adding 2 hours per day) as you tolerate. For example, if you do 6 hours out of the boot into a sneaker and your foot swells more than usual at night and it is difficult to control the discomfort, do not advance to 8 hours the next day, stay at 6 hours until you are able to tolerate it.            Manuals 12/24 12/31 01/13 1/23 1/31 2/3 2/5 2/12     Gentle PROM L foot  MBP LNK TAS TAS TAS TAS TAS                                            Neuro Re-Ed 5 min education and progression            NBOS EC   1 min  1 min 1 min Foam 1 min foam 1 min 1 min Foam     NBOS EO Foam  x5min           WS AP/ML 3\" x 15 3\" x20 5\"x20  SLS nv 15\" x 5 15\" x 5 15\" x 5 15\" x 5     AP ML Walking   5 laps  5 laps 5 " "laps  5 laps 5 laps 5 laps      Hip Abd + Ext   2x10  nv 2 x 10  2 x 10 2 x 10  2 x 10 RTB     SL hip abd  2x10 L                        Ther Ex             APs 30x  20 review  Stand HR/TR 2 x 10 Stand HR/TR 2 x 10 Stand HR/TR 2 x 10     LAQ  2 x 10  2x10 3# nv 2 x 10 3# 2 x 10 3# 2 x 10 3# 2 x 10 3#     SLR Flex 2 x 10 2 x 10 2x10  2 x 10 2 x 10 2 x 10 2 x 10 2 x 10     Gastroc ST with Strap 20\"x 4   20\"x4  20\" x 4 20\" x 4 Wedge 20\" x 4 Wedge 20\" x 4 Wedge 20\" x 4     Towel scrunch 30 reps  20 reps  nv 20 reps 20 reps 20 reps 20x     Seated HS + gastroc stretch w/ strap  [id number]                                     Ther Activity             Eventual sit<>Stand             Eventual Squat   2x10  nv 2 x 10 2 x 10 2 x 10 2 x 10     Gait training Nil             Elevations    FF  FF FF FF FF                  Modalities             CP prn.                                     "

## 2025-02-14 ENCOUNTER — OFFICE VISIT (OUTPATIENT)
Dept: SURGICAL ONCOLOGY | Facility: CLINIC | Age: 80
End: 2025-02-14
Payer: COMMERCIAL

## 2025-02-14 ENCOUNTER — PATIENT MESSAGE (OUTPATIENT)
Dept: FAMILY MEDICINE CLINIC | Facility: CLINIC | Age: 80
End: 2025-02-14

## 2025-02-14 VITALS
OXYGEN SATURATION: 99 % | DIASTOLIC BLOOD PRESSURE: 88 MMHG | SYSTOLIC BLOOD PRESSURE: 152 MMHG | HEART RATE: 77 BPM | BODY MASS INDEX: 32.58 KG/M2 | WEIGHT: 151 LBS | HEIGHT: 57 IN

## 2025-02-14 DIAGNOSIS — D35.1 PARATHYROID ADENOMA: Primary | ICD-10-CM

## 2025-02-14 DIAGNOSIS — Z98.890 HISTORY OF PARATHYROIDECTOMY: ICD-10-CM

## 2025-02-14 DIAGNOSIS — Z90.89 HISTORY OF PARATHYROIDECTOMY: ICD-10-CM

## 2025-02-14 DIAGNOSIS — R91.1 LUNG NODULE SEEN ON IMAGING STUDY: ICD-10-CM

## 2025-02-14 PROCEDURE — 99215 OFFICE O/P EST HI 40 MIN: CPT | Performed by: SURGERY

## 2025-02-14 NOTE — PROGRESS NOTES
Surgical Oncology Follow Up  [address] [facility]  [facility] [place]  [address] [facility] [address]  [place] [address]    Rachelle GARDNER Toddmumtazching  1945  [id number]      Chief Complaint   Patient presents with    Follow-up     3 mo fu parathyroidectomy        Assessment & Plan:   80-year-old female with post parathyroid surgery she is doing well.  Her hypercalcemia symptoms have resolved.  Most recent labs were reviewed.  She is here after CT chest which persistent with the lung nodule/groundglass appearance.  Radiology has recommended us to repeat the CT chest which we will order and see her after 6 months.  She understand if she has any shortness of breath, chronic cough unexpected weight loss or any other major symptoms to communicate with us or her primary care physician for further workup.  At this point will follow her after CT chest.    Cancer History:     Oncology History    No history exists.         Interval History:   Follow-up after CT chest    Review of Systems:   Review of Systems   Constitutional:  Negative for chills and fever.   HENT:  Negative for ear pain and sore throat.    Eyes:  Negative for pain and visual disturbance.   Respiratory:  Negative for cough and shortness of breath.    Cardiovascular:  Negative for chest pain and palpitations.   Gastrointestinal:  Negative for abdominal pain and vomiting.   Genitourinary:  Negative for dysuria and hematuria.   Musculoskeletal:  Negative for arthralgias and back pain.   Skin:  Negative for color change and rash.   Neurological:  Negative for seizures and syncope.   All other systems reviewed and are negative.      Past Medical History     Patient Active Problem List   Diagnosis    Benign essential hypertension    Vitamin D insufficiency    Dyslipidemia    Other osteoporosis without current pathological fracture    Seasonal allergies    Varicose veins of left lower extremity with pain    Bilateral lower extremity edema    S/P  MVR (mitral valve repair)    Mitral valve prolapse    Age-related osteoporosis without current pathological fracture    Cavovarus deformity of foot, acquired, left    Pain of left hip    Chronic left-sided low back pain without sciatica    History of parathyroidectomy    Parathyroid adenoma    Lung nodule seen on imaging study    Arthritis of left midfoot     Past Medical History:   Diagnosis Date    Arthritis     Cellulitis of face 10/16/2019    Bilateral nares    Colon polyp     Family history of colon cancer in mother     Functional murmur     Hypercalcemia 2016    Hypertension     Dx'd in , controlled     Osteoporosis     Screening for breast cancer 2018    SOB (shortness of breath) 2020    Viral upper respiratory tract infection 10/30/2018     Past Surgical History:   Procedure Laterality Date    BUNIONECTOMY Bilateral     CARDIAC SURGERY       SECTION      x2, 1972 &     CHG ASSAY OF PARATHORMONE N/A 10/01/2024    Procedure: INTRAOPERATIVE PTH AND LARYNGEAL NEVE MONITORING;  Surgeon: Luis Miguel Costa MD;  Location: [facility];  Service: Surgical Oncology    COLONOSCOPY      DENTAL IMPLANT      FL GUIDED NEEDLE PLAC BX/ASP/INJ  2024    FLEXIBLE SIGMOIDOSCOPY      MITRAL VALVE REPAIR          MO ARTHRD MIDTARSL/TARSOMETATARSAL MULT/TRANSVRS Left 2024    Procedure: Left 2nd and 3rd TMT joint arthrodeses;  Surgeon: James R Lachman, MD;  [facility];  Service: Orthopedics    MO COLONOSCOPY FLX DX W/COLLJ SPEC WHEN PFRMD N/A 10/10/2016    Procedure: COLONOSCOPY;  Surgeon: Patsy Escobedo MD;  Location: [facility];  Service: Gastroenterology    MO IONM 1 ON 1 IN OR W/ATTENDANCE EACH 15 MINUTES N/A 10/01/2024    Procedure: INTRAOPERATIVE PTH AND LARYNGEAL NEVE MONITORING;  Surgeon: Luis Miguel Costa MD;  [facility];  Service: Surgical Oncology    MO PARATHYROIDECTOMY/EXPLORATION PARATHYROIDS Bilateral 10/01/2024    Procedure:  RESECTION OF RIGHT AND LEFT PARATHYROID GLANDS, POSSIBLE FOUR GLAND PARATHYROID EXPLORATION;  Surgeon: Luis Miguel Costa MD;  [facility];  Service: Surgical Oncology    TOE SURGERY Bilateral     hammer toe correction surgery    TONSILLECTOMY      with Adenoidectomy     Family History   Problem Relation Age of Onset    Colon cancer Mother 73    Cancer Mother     Aneurysm Father     No Known Problems Sister     No Known Problems Paternal Aunt     No Known Problems Paternal Aunt     No Known Problems Paternal Aunt     No Known Problems Paternal Aunt     No Known Problems Maternal Grandmother     No Known Problems Maternal Grandfather     No Known Problems Paternal Grandmother     No Known Problems Paternal Grandfather     No Known Problems Daughter     No Known Problems Son     No Known Problems Son      Social History     Socioeconomic History    Marital status: /Civil Union     Spouse name: Not on file    Number of children: Not on file    Years of education: Not on file    Highest education level: Not on file   Occupational History    Not on file   Tobacco Use    Smoking status: Never     Passive exposure: Past    Smokeless tobacco: Never   Vaping Use    Vaping status: Never Used   Substance and Sexual Activity    Alcohol use: Not Currently    Drug use: No    Sexual activity: Not Currently     Partners: Male     Birth control/protection: None   Other Topics Concern    Not on file   Social History Narrative    Caffeine use via coffee, 1 serv/day    Exercises regularly, walking      Social Drivers of Health     Financial Resource Strain: Low Risk  (5/11/2023)    Overall Financial Resource Strain (CARDIA)     Difficulty of Paying Living Expenses: Not hard at all   Food Insecurity: No Food Insecurity (10/2/2024)    Nursing - Inadequate Food Risk Classification     Worried About Running Out of Food in the Last Year: Never true     Ran Out of Food in the Last Year: Never true     Ran Out of Food  in the Last Year: Not on file   Transportation Needs: No Transportation Needs (10/2/2024)    PRAPARE - Transportation     Lack of Transportation (Medical): No     Lack of Transportation (Non-Medical): No   Physical Activity: Not on file   Stress: Not on file   Social Connections: Not on file   Intimate Partner Violence: Not on file   Housing Stability: Low Risk  (10/2/2024)    Housing Stability Vital Sign     Unable to Pay for Housing in the Last Year: No     Number of Times Moved in the Last Year: 1     Homeless in the Last Year: No       Current Outpatient Medications:     ascorbic acid (VITAMIN C) 250 mg tablet, Take 250 mg by mouth daily, Disp: , Rfl:     aspirin (ECOTRIN LOW STRENGTH) 81 mg EC tablet, Take 1 tablet (81 mg total) by mouth 2 (two) times a day, Disp: 84 tablet, Rfl: 0    atorvastatin (LIPITOR) 20 mg tablet, Take 1 tablet (20 mg total) by mouth daily, Disp: 100 tablet, Rfl: 3    Coenzyme Q10 10 MG capsule, Take 1 capsule (10 mg total) by mouth daily, Disp: 90 capsule, Rfl: 3    denosumab (PROLIA) 60 mg/mL, Inject 1 mL (60 mg total) under the skin once for 1 dose, Disp: 1 mL, Rfl: 1    Diclofenac Sodium (VOLTAREN) 1 %, Apply 2 g topically 4 (four) times a day, Disp: 200 g, Rfl: 3    losartan (COZAAR) 50 mg tablet, Take 1 tablet (50 mg total) by mouth every morning, Disp: 100 tablet, Rfl: 1    metoprolol succinate (TOPROL-XL) 50 mg 24 hr tablet, Take 1 tablet (50 mg total) by mouth daily In the evening daily, Disp: 90 tablet, Rfl: 3    torsemide (DEMADEX) 10 mg tablet, Take 1 tablet (10 mg total) by mouth daily, Disp: 90 tablet, Rfl: 3    vitamin B-12 (VITAMIN B-12) 1,000 mcg tablet, Take 1 tablet (1,000 mcg total) by mouth daily, Disp: 90 tablet, Rfl: 0  Allergies   Allergen Reactions    Poison Ivy Extract Rash       Physical Exam:     Vitals:    02/14/25 0847   BP: 152/88   Pulse: 77   SpO2: 99%     Physical Exam  Constitutional:       Appearance: Normal appearance.   HENT:      Head: Normocephalic  and atraumatic.      Nose: Nose normal.      Mouth/Throat:      Mouth: Mucous membranes are moist.   Eyes:      Pupils: Pupils are equal, round, and reactive to light.   Neck:        Comments: Well-healed neck incision trachea is midline no JVD no palpable adenopathy no swelling.  Cardiovascular:      Rate and Rhythm: Normal rate.      Pulses: Normal pulses.      Heart sounds: Normal heart sounds.   Pulmonary:      Effort: Pulmonary effort is normal.      Breath sounds: Normal breath sounds.   Abdominal:      General: Bowel sounds are normal.      Palpations: Abdomen is soft.   Musculoskeletal:         General: Normal range of motion.      Cervical back: Normal range of motion and neck supple.   Skin:     General: Skin is warm.   Neurological:      General: No focal deficit present.      Mental Status: She is alert and oriented to person, place, and time.   Psychiatric:         Mood and Affect: Mood normal.         Behavior: Behavior normal.         Thought Content: Thought content normal.         Judgment: Judgment normal.           Results & Discussion:   CT CHEST WITH IV CONTRAST     INDICATION: R91.1: Solitary pulmonary nodule.     COMPARISON: CT parathyroid study 8/13/2024     TECHNIQUE: CT examination of the chest was performed. Multiplanar 2D reformatted images were created from the source data.     This examination, like all CT scans performed in the [facility], was performed utilizing techniques to minimize radiation dose exposure, including the use of iterative reconstruction and automated exposure control. Radiation dose length   product (DLP) for this visit: 235 mGy-cm     IV Contrast: 80 mL of iohexol (OMNIPAQUE)     FINDINGS:     LUNGS: No tracheal or endobronchial lesion.     Pulmonary nodules, as follows:  Unchanged 4 mm groundglass left upper lobe nodule with 2 mm solid component (3:86)        Focal 1.4 cm groundglass opacity in the right lower lobe, not imaged on any prior study.    "  PLEURA: Unremarkable.     HEART/GREAT VESSELS: Mild cardiomegaly. Post mitral valve placement. No thoracic aortic aneurysm.     MEDIASTINUM AND HANANE: Unremarkable.     CHEST WALL AND LOWER NECK: Multinodular thyroid, with largest nodule measuring up to 1.9 cm on the right. Incidental discovery of one or more thyroid nodule(s) measuring more than 1.5 cm and without suspicious features is noted in this patient who is   above 35 years old; according to guidelines published in the February 2015 white paper on incidental thyroid nodules in the Journal of the American College of Radiology (JACR), further characterization with thyroid ultrasound may be considered.  However,   as the majority of incidental thyroid nodules are benign and because small incidental thyroid malignancies typically demonstrate indolent behavior, consideration of the patient's life expectancy and possible comorbidities should be taken into account   prior to a decision to pursue further imaging.     VISUALIZED STRUCTURES IN THE UPPER ABDOMEN: 3.8 cm posterior right hepatic lobe cyst. Cholelithiasis. Bilateral upper pole scarring. Parapelvic renal cysts, incompletely visualized.     OSSEOUS STRUCTURES: Spinal degenerative changes are noted. No acute fracture or destructive osseous lesion.     IMPRESSION:     Newly discovered groundglass nodule in the right lower lobe, 1.4 cm. This finding could be infectious/inflammatory, but adenocarcinoma spectrum lesion is also possible. Recommendation is for follow-up noncontrast chest CT in 6 months.     Unchanged mixed groundglass and solid 4 mm left upper lobe pulmonary nodule, probably benign, but should be given attention at the time of follow-up examination.     Thyroid nodule as described above.     This examination was marked \"significant notification\" in Epic in order to begin the standard process by which the radiology reading room liaison alerts the referring practitioner.        I did review CT " chest report and discussed in detail.  Recommendation is to repeat CT chest in 6 months by radiologist.  We will order that and see her.  She was told if she has any cough unexpected weight loss shortness of breath to call us in the interim.  I did discussed in detail nature of  lung nodules and follow-up. .  She also would like to fix her teeth I said go ahead and teeth fixed at this point.  she understands and  agrees . All patient questions were answered.       Advance Care Planning/Advance Directives:  I Luis Miguel Costa MD discussed the disease status with Rachelle Narayanan  today 02/14/25  treatment plans and follow-up with the patient.

## 2025-02-17 ENCOUNTER — OFFICE VISIT (OUTPATIENT)
Dept: PHYSICAL THERAPY | Facility: CLINIC | Age: 80
End: 2025-02-17
Payer: COMMERCIAL

## 2025-02-17 DIAGNOSIS — M19.072 ARTHRITIS OF LEFT MIDFOOT: Primary | ICD-10-CM

## 2025-02-17 DIAGNOSIS — Z51.89 AFTERCARE: ICD-10-CM

## 2025-02-17 PROCEDURE — 97110 THERAPEUTIC EXERCISES: CPT | Performed by: PHYSICAL THERAPIST

## 2025-02-17 PROCEDURE — 97140 MANUAL THERAPY 1/> REGIONS: CPT | Performed by: PHYSICAL THERAPIST

## 2025-02-17 NOTE — PROGRESS NOTES
"Daily Note     Today's date: 2025  Patient name: Rachelle Narayanan  : 1945  MRN: [id number]  Referring provider: Lachman, James R, MD  Dx:   Encounter Diagnosis     ICD-10-CM    1. Arthritis of left midfoot  M19.072       2. Aftercare  Z51.89                      Subjective: Pt presents today stating largest issue is stiffness when waking up in the AM. Otherwise feeling well.        Objective: See treatment diary below      Assessment:   Largest challenge is still elevations improving as a whole alternate up and down, mild difficulty with descent fair eccentrics.  Pt enhanced tolerance to activity with CKC based intervention.  RE n.v.      Precautions: Falls Hx, HTN, CHF with OHS  Osteoporosis, PWB L LE in CAM with eventual progression to WBAT,  2nd and 3rd TMT Joint Arthrodesis performed by Dr. Lachman on 24.    Note from 24  Then 4 weeks of weightbearing as tolerated in the CAM boot.     You may begin weaning your boot and transitioning to a sneaker (). It is important to do this gradually to avoid aggravating the healing process.     , you may come out of the boot into a sneaker for 2 hours.  2. , you may come out of the boot into a sneaker for 4 hours,  3. The next day, you may come out of the boot into a sneaker for 6 hours.  4. Continue this (adding 2 hours per day) as you tolerate. For example, if you do 6 hours out of the boot into a sneaker and your foot swells more than usual at night and it is difficult to control the discomfort, do not advance to 8 hours the next day, stay at 6 hours until you are able to tolerate it.            Manuals 12/24 12/31 01/13 1/23 1/31 2/3 2/5 2/12 2/17    Gentle PROM L foot  MBP LNK TAS TAS TAS TAS TAS TAS                                           Neuro Re-Ed 5 min education and progression            NBOS EC   1 min  1 min 1 min Foam 1 min foam 1 min 1 min Foam 1 min foam    NBOS EO Foam  x5min           WS AP/ML 3\" x 15 3\" x20 5\"x20  " "SLS nv 15\" x 5 15\" x 5 15\" x 5 15\" x 5 15\" x 5    AP ML Walking   5 laps  5 laps 5 laps  5 laps 5 laps 5 laps  5 laps    Hip Abd + Ext   2x10  nv 2 x 10  2 x 10 2 x 10  2 x 10 RTB 2 x 10 RTB    SL hip abd  2x10 L                        Ther Ex             APs 30x  20 review  Stand HR/TR 2 x 10 Stand HR/TR 2 x 10 Stand HR/TR 2 x 10 HR/TR 30x    LAQ  2 x 10  2x10 3# nv 2 x 10 3# 2 x 10 3# 2 x 10 3# 2 x 10 3# 2 x 10 3#    SLR Flex 2 x 10 2 x 10 2x10  2 x 10 2 x 10 2 x 10 2 x 10 2 x 10 2 x 10    Gastroc ST with Strap 20\"x 4   20\"x4  20\" x 4 20\" x 4 Wedge 20\" x 4 Wedge 20\" x 4 Wedge 20\" x 4 Wedge 20\" x 4    Towel scrunch 30 reps  20 reps  nv 20 reps 20 reps 20 reps 20x 20x    Seated HS + gastroc stretch w/ strap  [id number]           LP + HR         60# 2 x 10                 Ther Activity             Eventual sit<>Stand             Eventual Squat   2x10  nv 2 x 10 2 x 10 2 x 10 2 x 10 2 x 10    Gait training Nil             Elevations    FF  FF FF FF FF FF                 Modalities             CP prn.                                     "

## 2025-02-19 ENCOUNTER — APPOINTMENT (OUTPATIENT)
Dept: PHYSICAL THERAPY | Facility: CLINIC | Age: 80
End: 2025-02-19
Payer: COMMERCIAL

## 2025-02-24 ENCOUNTER — EVALUATION (OUTPATIENT)
Dept: PHYSICAL THERAPY | Facility: CLINIC | Age: 80
End: 2025-02-24
Payer: COMMERCIAL

## 2025-02-24 DIAGNOSIS — Z51.89 AFTERCARE: ICD-10-CM

## 2025-02-24 DIAGNOSIS — M19.072 ARTHRITIS OF LEFT MIDFOOT: Primary | ICD-10-CM

## 2025-02-24 PROCEDURE — 97112 NEUROMUSCULAR REEDUCATION: CPT | Performed by: PHYSICAL THERAPIST

## 2025-02-24 PROCEDURE — 97110 THERAPEUTIC EXERCISES: CPT | Performed by: PHYSICAL THERAPIST

## 2025-02-24 NOTE — PROGRESS NOTES
PT Evaluation     Today's date: 2025  Patient name: Rachelle Narayanan  : 1945  MRN: [id number]  Referring provider: Lachman, James R, MD  Dx:   Encounter Diagnosis     ICD-10-CM    1. Arthritis of left midfoot  M19.072       2. Aftercare  Z51.89                          Assessment  Impairments: abnormal gait, abnormal or restricted ROM, activity intolerance, impaired balance, impaired physical strength, lacks appropriate home exercise program, pain with function, weight-bearing intolerance, poor body mechanics, activity limitations and endurance    Assessment details: Pt continues to make gains in her mobility, strength, and range.  Still has mild loss of strength that is likely associated with performance of elevations.  Pt at this time will benefit continued intervention to focus on strengthening and endurance of L LE to achieve alternating ability of elevations.  Thank you very much for this kind and motivated referral.        Understanding of Dx/Px/POC: good     Prognosis: good    Goals  STG 4 Weeks:  Decrease pain at worst to 4 -met  Improve range to improve DF and Gtoe by 5 -met  Improve strength to 4-/5 LE  -met  Independent with HEP -met  MTG 8 Weeks:  Decrease pain at worst to 2  Improve range to DF and gtoe to equal that of R LE.    Improve strength to 4+ LE.    Independent with HEP  MTG 12 Weeks:  Decrease pain at worst to 1  Improve range to sustain in CKC based interventions  Improve strength to LE 5/5.  Independent with HEP  LTG 16 Weeks:  Assess as needed.       Plan  Patient would benefit from: skilled physical therapy  Planned modality interventions: cryotherapy and thermotherapy: hydrocollator packs    Planned therapy interventions: abdominal trunk stabilization, activity modification, manual therapy, joint mobilization, neuromuscular re-education, patient/caregiver education, postural training, strengthening, stretching, therapeutic activities, therapeutic exercise, therapeutic  training, transfer training, IADL retraining, home exercise program, graded motor, graded exercise, graded activity, gait training, functional ROM exercises, flexibility, behavior modification, body mechanics training and balance    Frequency: 2x week  Duration in weeks: 10  Treatment plan discussed with: patient        Subjective Evaluation    History of Present Illness  Date of onset: 2024  Mechanism of injury: Pt presents today stating that she is feeling really well.  Reports that pain levels are minimal to nil, stiffness more present than any other presentation.  Reports that she has been able to perform stairs but not in alternating form.  Desires ability to alternate as she was able prior to onset of pain that lead to surgery.  Standing and walking are going well, desires a bit more endurance.  She is feeling well, content with her progression.    Quality of life: good    Patient Goals  Patient goals for therapy: increased strength, return to sport/leisure activities, increased motion, improved balance and decreased pain    Pain  Current pain ratin  At best pain ratin  At worst pain ratin  Quality: dull ache, sharp and tight  Relieving factors: ice, change in position, rest and medications  Aggravating factors: standing, walking, stair climbing and lifting  Progression: improved    Social Support  Steps to enter house: yes  Stairs in house: yes   Lives in: multiple-level home and one-story house  Lives with: spouse (Carlos is .)      Diagnostic Tests  X-ray: normal        Objective     Active Range of Motion   Left Ankle/Foot   Dorsiflexion (ke): 0 degrees   Plantar flexion: 60 degrees   Inversion: 35 degrees   Eversion: 25 degrees   Great toe extension: 70 degrees     Right Ankle/Foot   Dorsiflexion (ke): 0 degrees   Plantar flexion: 60 degrees   Inversion: 35 degrees   Eversion: 25 degrees   Great toe extension: 70 degrees     Additional Active Range of Motion Details  Forward head,  "rounded shoulders, Lordosis  Antalgia with L > R.  ( Minimal)  B/L Sensation intact to L3,4,5,S1,S2  LE Strength  Hip   L Flex 5  Ext 5 Abd 5 Add 5  R Flex 5 Ext 5 Abd 5 Add 5  LE Screen  Knee strong and painless b/l.   Ankle   L DF 4 Inv/ Ever 4 Gtoe 5  R DF Ever Inv Gtoe all 5  Joint Mobility  Palpation  Sts  Incision clean dry and intact  TUG: 15.85\"  no AD //  14\"  // 11\" no AD  Elevations one step up and down at a time R UE support with Rail.  // R UE rail alternate up, one foot at a time down with L on descent.                   Precautions: Falls Hx, HTN, CHF with OHS 2021 Osteoporosis, PWB L LE in CAM with eventual progression to WBAT,  2nd and 3rd TMT Joint Arthrodesis performed by Dr. Lachman on 11/7/24.    Note from 12/18/24  Then 4 weeks of weightbearing as tolerated in the CAM boot.     You may begin weaning your boot and transitioning to a sneaker (1/13). It is important to do this gradually to avoid aggravating the healing process.     1/13, you may come out of the boot into a sneaker for 2 hours.  2. 1/14, you may come out of the boot into a sneaker for 4 hours,  3. The next day, you may come out of the boot into a sneaker for 6 hours.  4. Continue this (adding 2 hours per day) as you tolerate. For example, if you do 6 hours out of the boot into a sneaker and your foot swells more than usual at night and it is difficult to control the discomfort, do not advance to 8 hours the next day, stay at 6 hours until you are able to tolerate it.          Manuals 12/24 12/31 01/13 1/23 1/31 2/3 2/5 2/12 2/17 2/24   Gentle PROM L foot  MBP LNK TAS TAS TAS TAS TAS TAS Full                                          Neuro Re-Ed 5 min education and progression            NBOS EC   1 min  1 min 1 min Foam 1 min foam 1 min 1 min Foam 1 min foam 1 min   NBOS EO Foam  x5min           WS AP/ML 3\" x 15 3\" x20 5\"x20  SLS nv 15\" x 5 15\" x 5 15\" x 5 15\" x 5 15\" x 5 SLS 15\" x 5   AP ML Walking   5 laps  5 laps 5 laps  5 laps 5 " "laps 5 laps  5 laps 5 laps   Hip Abd + Ext   2x10  nv 2 x 10  2 x 10 2 x 10  2 x 10 RTB 2 x 10 RTB 2 x 10 RTB   SL hip abd  2x10 L                        Ther Ex             APs 30x  20 review  Stand HR/TR 2 x 10 Stand HR/TR 2 x 10 Stand HR/TR 2 x 10 HR/TR 30x HR/TR 30x   LAQ  2 x 10  2x10 3# nv 2 x 10 3# 2 x 10 3# 2 x 10 3# 2 x 10 3# 2 x 10 3#    SLR Flex 2 x 10 2 x 10 2x10  2 x 10 2 x 10 2 x 10 2 x 10 2 x 10 2 x 10    Gastroc ST with Strap 20\"x 4   20\"x4  20\" x 4 20\" x 4 Wedge 20\" x 4 Wedge 20\" x 4 Wedge 20\" x 4 Wedge 20\" x 4 Wedge 20\" x4    Towel scrunch 30 reps  20 reps  nv 20 reps 20 reps 20 reps 20x 20x    Seated HS + gastroc stretch w/ strap  [id number]           LP + HR         60# 2 x 10 60# nv                Ther Activity             Eventual sit<>Stand             Eventual Squat   2x10  nv 2 x 10 2 x 10 2 x 10 2 x 10 2 x 10 2 x 10   Gait training Nil             Elevations    FF  FF FF FF FF FF FF                Modalities             CP prn.                                     "

## 2025-02-26 ENCOUNTER — APPOINTMENT (OUTPATIENT)
Dept: PHYSICAL THERAPY | Facility: CLINIC | Age: 80
End: 2025-02-26
Payer: COMMERCIAL

## 2025-02-28 DIAGNOSIS — Z98.890 S/P MVR (MITRAL VALVE REPAIR): ICD-10-CM

## 2025-02-28 RX ORDER — METOPROLOL SUCCINATE 50 MG/1
50 TABLET, EXTENDED RELEASE ORAL EVERY EVENING
Qty: 90 TABLET | Refills: 1 | Status: SHIPPED | OUTPATIENT
Start: 2025-02-28

## 2025-04-14 DIAGNOSIS — I34.1 MITRAL VALVE PROLAPSE: ICD-10-CM

## 2025-04-14 DIAGNOSIS — Z98.890 S/P MVR (MITRAL VALVE REPAIR): ICD-10-CM

## 2025-04-15 RX ORDER — TORSEMIDE 10 MG/1
10 TABLET ORAL DAILY
Qty: 90 TABLET | Refills: 1 | Status: SHIPPED | OUTPATIENT
Start: 2025-04-15

## 2025-04-22 ENCOUNTER — RA CDI HCC (OUTPATIENT)
Dept: OTHER | Facility: HOSPITAL | Age: 80
End: 2025-04-22

## 2025-04-22 NOTE — PROGRESS NOTES
N18.30, I13.0  HCC coding opportunities          Chart Reviewed number of suggestions sent to Provider: 2     Patients Insurance     Medicare Insurance: [employer]vantage

## 2025-04-25 ENCOUNTER — APPOINTMENT (OUTPATIENT)
Dept: LAB | Facility: AMBULARY SURGERY CENTER | Age: 80
End: 2025-04-25
Payer: COMMERCIAL

## 2025-04-25 DIAGNOSIS — M81.8 IDIOPATHIC OSTEOPOROSIS: ICD-10-CM

## 2025-04-25 DIAGNOSIS — R79.89 ELEVATED PARATHYROID HORMONE: ICD-10-CM

## 2025-04-25 DIAGNOSIS — I34.1 MITRAL VALVE PROLAPSE: ICD-10-CM

## 2025-04-25 DIAGNOSIS — Z98.890 S/P MVR (MITRAL VALVE REPAIR): ICD-10-CM

## 2025-04-25 DIAGNOSIS — E04.1 THYROID NODULE: ICD-10-CM

## 2025-04-25 LAB
25(OH)D3 SERPL-MCNC: 30.9 NG/ML (ref 30–100)
ALBUMIN SERPL BCG-MCNC: 3.7 G/DL (ref 3.5–5)
ALP SERPL-CCNC: 86 U/L (ref 34–104)
ALT SERPL W P-5'-P-CCNC: 15 U/L (ref 7–52)
ANION GAP SERPL CALCULATED.3IONS-SCNC: 10 MMOL/L (ref 4–13)
AST SERPL W P-5'-P-CCNC: 15 U/L (ref 13–39)
BACTERIA UR QL AUTO: ABNORMAL /HPF
BILIRUB SERPL-MCNC: 0.6 MG/DL (ref 0.2–1)
BILIRUB UR QL STRIP: NEGATIVE
BUN SERPL-MCNC: 29 MG/DL (ref 5–25)
CALCIUM SERPL-MCNC: 9.6 MG/DL (ref 8.4–10.2)
CHLORIDE SERPL-SCNC: 109 MMOL/L (ref 96–108)
CLARITY UR: CLEAR
CO2 SERPL-SCNC: 24 MMOL/L (ref 21–32)
COLOR UR: ABNORMAL
CREAT SERPL-MCNC: 1.27 MG/DL (ref 0.6–1.3)
GFR SERPL CREATININE-BSD FRML MDRD: 40 ML/MIN/1.73SQ M
GLUCOSE P FAST SERPL-MCNC: 99 MG/DL (ref 65–99)
GLUCOSE UR STRIP-MCNC: NEGATIVE MG/DL
HGB UR QL STRIP.AUTO: NEGATIVE
KETONES UR STRIP-MCNC: NEGATIVE MG/DL
LEUKOCYTE ESTERASE UR QL STRIP: ABNORMAL
MAGNESIUM SERPL-MCNC: 2 MG/DL (ref 1.9–2.7)
NITRITE UR QL STRIP: NEGATIVE
NON-SQ EPI CELLS URNS QL MICRO: ABNORMAL /HPF
PH UR STRIP.AUTO: 6 [PH]
POTASSIUM SERPL-SCNC: 4.8 MMOL/L (ref 3.5–5.3)
PROT SERPL-MCNC: 6.8 G/DL (ref 6.4–8.4)
PROT UR STRIP-MCNC: ABNORMAL MG/DL
PTH-INTACT SERPL-MCNC: 74.5 PG/ML (ref 12–88)
RBC #/AREA URNS AUTO: ABNORMAL /HPF
RENAL EPI CELLS #/AREA URNS HPF: PRESENT /[HPF]
SODIUM SERPL-SCNC: 143 MMOL/L (ref 135–147)
SP GR UR STRIP.AUTO: 1.02 (ref 1–1.03)
T4 FREE SERPL-MCNC: 0.85 NG/DL (ref 0.61–1.12)
TRANS CELLS #/AREA URNS HPF: PRESENT /[HPF]
TSH SERPL DL<=0.05 MIU/L-ACNC: 0.6 UIU/ML (ref 0.45–4.5)
UROBILINOGEN UR STRIP-ACNC: <2 MG/DL
VIT B12 SERPL-MCNC: 545 PG/ML (ref 180–914)
WBC #/AREA URNS AUTO: ABNORMAL /HPF

## 2025-04-25 PROCEDURE — 84443 ASSAY THYROID STIM HORMONE: CPT

## 2025-04-25 PROCEDURE — 82306 VITAMIN D 25 HYDROXY: CPT

## 2025-04-25 PROCEDURE — 82607 VITAMIN B-12: CPT

## 2025-04-25 PROCEDURE — 81001 URINALYSIS AUTO W/SCOPE: CPT

## 2025-04-25 PROCEDURE — 83735 ASSAY OF MAGNESIUM: CPT

## 2025-04-25 PROCEDURE — 83970 ASSAY OF PARATHORMONE: CPT

## 2025-04-25 PROCEDURE — 84439 ASSAY OF FREE THYROXINE: CPT

## 2025-04-25 PROCEDURE — 80053 COMPREHEN METABOLIC PANEL: CPT

## 2025-04-25 PROCEDURE — 36415 COLL VENOUS BLD VENIPUNCTURE: CPT

## 2025-04-29 ENCOUNTER — OFFICE VISIT (OUTPATIENT)
Dept: FAMILY MEDICINE CLINIC | Facility: CLINIC | Age: 80
End: 2025-04-29
Payer: COMMERCIAL

## 2025-04-29 VITALS
HEIGHT: 57 IN | HEART RATE: 68 BPM | DIASTOLIC BLOOD PRESSURE: 76 MMHG | OXYGEN SATURATION: 97 % | SYSTOLIC BLOOD PRESSURE: 130 MMHG | BODY MASS INDEX: 32.68 KG/M2 | TEMPERATURE: 97.8 F

## 2025-04-29 DIAGNOSIS — Z98.890 S/P MVR (MITRAL VALVE REPAIR): ICD-10-CM

## 2025-04-29 DIAGNOSIS — M17.4 OTHER SECONDARY OSTEOARTHRITIS OF BOTH KNEES: ICD-10-CM

## 2025-04-29 DIAGNOSIS — E55.9 VITAMIN D DEFICIENCY: ICD-10-CM

## 2025-04-29 DIAGNOSIS — R79.89 ELEVATED PARATHYROID HORMONE: ICD-10-CM

## 2025-04-29 DIAGNOSIS — L98.9 SKIN LESIONS: ICD-10-CM

## 2025-04-29 DIAGNOSIS — I34.1 MITRAL VALVE PROLAPSE: Primary | ICD-10-CM

## 2025-04-29 LAB
SL AMB  POCT GLUCOSE, UA: NORMAL
SL AMB LEUKOCYTE ESTERASE,UA: NORMAL
SL AMB POCT BILIRUBIN,UA: NORMAL
SL AMB POCT BLOOD,UA: NORMAL
SL AMB POCT CLARITY,UA: CLEAR
SL AMB POCT COLOR,UA: YELLOW
SL AMB POCT KETONES,UA: NORMAL
SL AMB POCT NITRITE,UA: NORMAL
SL AMB POCT PH,UA: 6
SL AMB POCT SPECIFIC GRAVITY,UA: 1.02
SL AMB POCT URINE PROTEIN: NORMAL
SL AMB POCT UROBILINOGEN: 0.2

## 2025-04-29 PROCEDURE — 81002 URINALYSIS NONAUTO W/O SCOPE: CPT | Performed by: INTERNAL MEDICINE

## 2025-04-29 PROCEDURE — 99214 OFFICE O/P EST MOD 30 MIN: CPT | Performed by: INTERNAL MEDICINE

## 2025-04-29 RX ORDER — AMOXICILLIN 500 MG/1
CAPSULE ORAL
Qty: 20 CAPSULE | Refills: 0 | Status: SHIPPED | OUTPATIENT
Start: 2025-04-29 | End: 2025-05-06

## 2025-04-29 RX ORDER — HYDROCORTISONE ACETATE 0.5 %
1 CREAM (GRAM) TOPICAL
Qty: 90 CAPSULE | Refills: 1 | Status: SHIPPED | OUTPATIENT
Start: 2025-04-29

## 2025-04-29 RX ORDER — ERGOCALCIFEROL 1.25 MG/1
50000 CAPSULE, LIQUID FILLED ORAL WEEKLY
Qty: 12 CAPSULE | Refills: 3 | Status: SHIPPED | OUTPATIENT
Start: 2025-04-29

## 2025-04-29 NOTE — PATIENT INSTRUCTIONS

## 2025-04-29 NOTE — ASSESSMENT & PLAN NOTE
Orders:    POCT urine dip    ergocalciferol (VITAMIN D2) 50,000 units; Take 1 capsule (50,000 Units total) by mouth once a week    CBC and differential; Future    Lipid panel; Future    Quantiferon TB Gold Plus Assay; Future    H. pylori antigen, stool; Future    amoxicillin (AMOXIL) 500 mg capsule; Take 4 caps (2000 mg ) two hours before Procedure    Glucosamine-Chondroit-Vit C-Mn (Glucosamine Chondr 1500 Complx) CAPS; Take 1 capsule by mouth daily after lunch

## 2025-04-29 NOTE — PROGRESS NOTES
Name: Rachelle Narayanan      : 1945      MRN: [id number]  Encounter Provider: Evangelist Shankar MD  Encounter Date: 2025   Encounter department: [facility]  :  Assessment & Plan  Mitral valve prolapse    Orders:    POCT urine dip    ergocalciferol (VITAMIN D2) 50,000 units; Take 1 capsule (50,000 Units total) by mouth once a week    CBC and differential; Future    Lipid panel; Future    Quantiferon TB Gold Plus Assay; Future    H. pylori antigen, stool; Future    amoxicillin (AMOXIL) 500 mg capsule; Take 4 caps (2000 mg ) two hours before Procedure    Glucosamine-Chondroit-Vit C-Mn (Glucosamine Chondr 1500 Complx) CAPS; Take 1 capsule by mouth daily after lunch    S/P MVR (mitral valve repair)    Orders:    POCT urine dip    ergocalciferol (VITAMIN D2) 50,000 units; Take 1 capsule (50,000 Units total) by mouth once a week    CBC and differential; Future    Lipid panel; Future    Quantiferon TB Gold Plus Assay; Future    H. pylori antigen, stool; Future    amoxicillin (AMOXIL) 500 mg capsule; Take 4 caps (2000 mg ) two hours before Procedure    Glucosamine-Chondroit-Vit C-Mn (Glucosamine Chondr 1500 Complx) CAPS; Take 1 capsule by mouth daily after lunch    Elevated parathyroid hormone    Orders:    POCT urine dip    ergocalciferol (VITAMIN D2) 50,000 units; Take 1 capsule (50,000 Units total) by mouth once a week    CBC and differential; Future    Lipid panel; Future    Quantiferon TB Gold Plus Assay; Future    H. pylori antigen, stool; Future    amoxicillin (AMOXIL) 500 mg capsule; Take 4 caps (2000 mg ) two hours before Procedure    Glucosamine-Chondroit-Vit C-Mn (Glucosamine Chondr 1500 Complx) CAPS; Take 1 capsule by mouth daily after lunch    Vitamin D deficiency    Orders:    ergocalciferol (VITAMIN D2) 50,000 units; Take 1 capsule (50,000 Units total) by mouth once a week    Other secondary osteoarthritis of both knees    Orders:    Glucosamine-Chondroit-Vit C-Mn  "(Glucosamine Chondr 1500 Complx) CAPS; Take 1 capsule by mouth daily after lunch    Skin lesions    Orders:    Ambulatory Referral to Dermatology; Future    Life style mod  Continue same  FU w cardiology  RTC in 3 mos w Blood work       History of Present Illness   80 Y O lady is here for Regular check Up, she feels a Lot better, recent blood work and med list reviewed....      Review of Systems   Constitutional:  Negative for chills, fatigue and fever.   HENT:  Positive for postnasal drip. Negative for congestion, ear pain, facial swelling, sore throat, trouble swallowing and voice change.    Eyes:  Negative for pain, discharge and visual disturbance.   Respiratory:  Negative for cough, shortness of breath and wheezing.    Cardiovascular:  Negative for chest pain, palpitations and leg swelling.   Gastrointestinal:  Negative for abdominal pain, blood in stool, constipation, diarrhea, nausea and vomiting.   Endocrine: Negative for polydipsia, polyphagia and polyuria.   Genitourinary:  Negative for difficulty urinating, dysuria, hematuria and urgency.   Musculoskeletal:  Positive for arthralgias. Negative for back pain and myalgias.   Skin:  Negative for color change and rash.   Neurological:  Negative for dizziness, tremors, seizures, syncope, weakness and headaches.   Hematological:  Negative for adenopathy. Does not bruise/bleed easily.   Psychiatric/Behavioral:  Negative for dysphoric mood, sleep disturbance and suicidal ideas.    All other systems reviewed and are negative.      Objective   /76 (BP Location: Right arm, Patient Position: Sitting, Cuff Size: Standard)   Pulse 68   Temp 97.8 °F (36.6 °C) (Temporal)   Ht 4' 9\" (1.448 m)   SpO2 97%   BMI 32.68 kg/m²      Physical Exam  Vitals and nursing note reviewed.   Constitutional:       General: She is not in acute distress.     Appearance: She is well-developed. She is not diaphoretic.   HENT:      Head: Normocephalic and atraumatic.      Right Ear: " External ear normal.      Left Ear: External ear normal.      Nose: Nose normal.   Eyes:      General:         Right eye: No discharge.         Left eye: No discharge.      Conjunctiva/sclera: Conjunctivae normal.      Pupils: Pupils are equal, round, and reactive to light.   Neck:      Thyroid: No thyromegaly.      Trachea: No tracheal deviation.   Cardiovascular:      Rate and Rhythm: Normal rate and regular rhythm.      Heart sounds: Murmur heard.      No friction rub.   Pulmonary:      Effort: Pulmonary effort is normal. No respiratory distress.      Breath sounds: Normal breath sounds. No stridor. No wheezing or rales.   Abdominal:      General: Bowel sounds are normal. There is no distension.      Palpations: Abdomen is soft.      Tenderness: There is no abdominal tenderness. There is no guarding.   Musculoskeletal:         General: Tenderness present. No swelling or deformity. Normal range of motion.      Cervical back: Normal range of motion and neck supple.   Lymphadenopathy:      Cervical: No cervical adenopathy.   Skin:     General: Skin is warm and dry.      Capillary Refill: Capillary refill takes less than 2 seconds.      Coloration: Skin is not pale.      Findings: No erythema or rash.   Neurological:      Mental Status: She is alert and oriented to person, place, and time.      Cranial Nerves: No cranial nerve deficit.      Coordination: Coordination normal.   Psychiatric:         Mood and Affect: Mood normal.         Behavior: Behavior normal.

## 2025-04-30 ENCOUNTER — OFFICE VISIT (OUTPATIENT)
Dept: OBGYN CLINIC | Facility: CLINIC | Age: 80
End: 2025-04-30
Payer: COMMERCIAL

## 2025-04-30 ENCOUNTER — APPOINTMENT (OUTPATIENT)
Dept: RADIOLOGY | Facility: AMBULARY SURGERY CENTER | Age: 80
End: 2025-04-30
Attending: ORTHOPAEDIC SURGERY
Payer: COMMERCIAL

## 2025-04-30 VITALS — WEIGHT: 151 LBS | HEIGHT: 57 IN | BODY MASS INDEX: 32.58 KG/M2

## 2025-04-30 DIAGNOSIS — Z01.89 ENCOUNTER FOR LOWER EXTREMITY COMPARISON IMAGING STUDY: ICD-10-CM

## 2025-04-30 DIAGNOSIS — M19.071 ARTHRITIS OF RIGHT MIDFOOT: ICD-10-CM

## 2025-04-30 DIAGNOSIS — M19.072 ARTHRITIS OF LEFT MIDFOOT: ICD-10-CM

## 2025-04-30 DIAGNOSIS — M19.072 ARTHRITIS OF LEFT MIDFOOT: Primary | ICD-10-CM

## 2025-04-30 PROCEDURE — 73620 X-RAY EXAM OF FOOT: CPT

## 2025-04-30 PROCEDURE — 99213 OFFICE O/P EST LOW 20 MIN: CPT | Performed by: ORTHOPAEDIC SURGERY

## 2025-04-30 PROCEDURE — 73630 X-RAY EXAM OF FOOT: CPT

## 2025-04-30 NOTE — PATIENT INSTRUCTIONS
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 This is a wearing away of the cartilage in your foot leading to bone against bone which causes inflammation which causes pain.    The nonoperative treatment for this involves supportive, a carbon fiber foot plate and injections under fluoroscopic guidance. You may schedule your injection with our musculoskeletal radiology team at your earliest convenience.    Go to [email or web address] and type in \"Full length carbon fiber foot plate.\" Pick an item that looks like the one below.  Make sure it is the appropriate size for your foot (match your shoe size).  This insert goes under the orthotic in your shoe to stiffen the shoe.      If the injections and carbon fiber foot plate are not effective, you will be a candidate for a midfoot fusion surgery.  Midfoot Fusion     What is the midfoot?  The midfoot refers to the bones and joints that make up the arch and connect the forefoot (which includes the bones of the toes) to the hindfoot (which includes the ankle bone and the heel bone).     What is a midfoot fusion?    Midfoot fusion is a procedure in which the separate bones that make up the arch of the foot are fused into a single mass of bone. Fusion is also referred to as arthrodesis. Fusion eliminates the normal motion that occurs between two bones.      At left, this standing X-ray view of a right foot shows a gap between the first and second metatarsals (arrow). At right, this side X-ray view of the same foot shows loss of alignment. In a healthy foot, the two yellow lines would overlap one another.           Midfoot fusion can involve all of the midfoot joints. More commonly just one or a few of the joints are fused. The joints of the midfoot do not bend and move like your knee or elbow. They are designed to be relatively stiff to give your foot strength and support your body. Midfoot fusion does not generally produce much noticeable loss of motion because there is " fairly little motion to begin with.        What are the goals of a midfoot fusion?  The primary goal of midfoot fusion is to decrease pain and improve function. Eliminating the painful motion between arthritic joint surfaces and restoring the bones to their normal positions achieves this. Other goals include the correction of deformity and the return of stability to the arch of the foot. A successful midfoot fusion can achieve these goals and restore more normal walking ability.      What signs indicate a midfoot fusion may be needed?  The most common reason for midfoot fusion is painful arthritis in the midfoot joints that has not improved with nonsurgical treatment. Other common reasons to do a midfoot fusion include too much motion of one or more of the midfoot joints or deformity of the midfoot. Examples of conditions that may result in midfoot deformity include severe bunions and flatfoot deformity. Midfoot fusion is also indicated for certain acute fractures and joint displacement involving the midfoot.       When should I avoid surgery?  Midfoot fusion should not be performed if there is active infection or if the patient’s health is poor enough that the risk of surgery is too high. Conditions such as uncontrolled diabetes and blood flow problems may make a patient a poor candidate for surgery. Other reasons to not perform midfoot fusion include osteoporosis and poor skin quality. Smoking significantly increases the risk that bones will not fuse.      General Details of Procedure  Successful midfoot fusion depends on complete removal of all joint surfaces (cartilage) and stable fixation of the joints being fused. Residual cartilage can prevent the bones from fusing together. Failure to achieve adequate stability may allow too           Post-surgery X-rays showing correction. The arrow shows that the gap has been eliminated, and a single line can be drawn through the middle of the foot bones, which means the  foot is now in the proper position. Screws and plates were used for the repair.     much motion for fusion to occur.       Specific Techniques  Midfoot fusion is generally accomplished using one or two incisions on the top of the foot. The length of the incision and how many incisions are necessary is determined by the number of joints to be fused. Careful attention is paid to protecting tendons and nerves.     Stability is achieved during midfoot fusion using metal implants such as screws and plates. These are designed to immobilize the joints and allow for the formation of bone across the joint space. This process may involve the addition of bone graft material to fill any gaps that might exist between the bones after the cartilage has been removed. This bone graft material may be taken from another location in the patient’s body (autograft). It may also come from donated bone (allograft) or from a synthetic material. A combination of these materials may be used.      What happens after surgery?  After surgery a period of protection and immobilization is required for successful fusion to occur. A cast is typically placed for the first six to 10 weeks. Weightbearing is not allowed on the affected foot for eight to 12 weeks after surgery. X-rays are usually obtained every four weeks to assess progress of the fusion.     Gradually increased weightbearing is allowed as healing progresses. Initial weightbearing is protected in a prefabricated boot with gradual transition to supportive shoes. Physical therapy may be prescribed on a case-by-case basis to help the patient’s walking and balance.       Potential Complications  There are complications that relate to surgery in general. These include risks associated with anesthesia, infection, damage to nerves and blood vessels, and bleeding or blood clots.     A major potential complication after midfoot fusion is failure of the bones to fuse (nonunion). Other complications  can include over-correction or under-correction of deformity (malunion). There can be problems with wound healing. Prominent plates and screws can be painful and may require removal of the hardware. Injury to nerves on the top of the foot can occur.      Smoking is one of the leading risks for nonunion. Premature weightbearing can also result in failure of the bones to fuse.     Frequently Asked Questions  How much motion in my foot will I lose after midfoot fusion?  Motion of the midfoot joints is normally somewhat limited. Loss of that motion after fusion surgery tends to be well tolerated by patients. The more mobile joints of the ankle, hindfoot and forefoot are unaffected by midfoot fusion and thus continue to provide motion to the foot.     Will I set off an airport metal detector after midfoot fusion?  The strength of the metal detector and the amount of metal implants used determine whether hardware from a midfoot fusion will be detected. It is uncommon for the metal implants to be detectable by airport screening methods.      How will I get around after surgery before I am allowed to put any weight on the foot?  A combination of devices can be used, including crutches, walkers, knee-rollers, scooters and wheelchairs. Physical therapy is also used to help assess patient needs and improve mobility and safety. Certain patients may benefit from the assistance provided by a skilled nursing facility or post-operative rehabilitation unit.     Will the plates and screws have to be removed after midfoot fusion?  Metal implants used for midfoot fusion are not routinely removed. Hardware may need to be removed if there is a failure of the fusion or if infection develops. Painful hardware can be removed once the fusion is healed.

## 2025-04-30 NOTE — ASSESSMENT & PLAN NOTE
Patient is 6 months s/p left 2nd and 3rd TMT joint arthrodesis. DOS: 11/7/2024  WBAT in supportive sneaker  Continue PT/HEP as directed  OTC pain medication as needed, rest, elevation, compression stocking for pain and swelling control   Patient  has no restrictions and is stable from an orthopedic standpoint   Orders:    XR foot 3+ vw left; Future

## 2025-04-30 NOTE — PROGRESS NOTES
James R Lachman, M.D.  Attending, Orthopaedic Surgery  Foot and Ankle  [facility]      ORTHOPAEDIC FOOT AND ANKLE CLINIC VISIT     Assessment and Plan     Assessment & Plan  Arthritis of left midfoot  Patient is 6 months s/p left 2nd and 3rd TMT joint arthrodesis. 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 Feeling so/so. Getting better. Still has numbness in the toes.   Left 2nd and 3rd TMT joint arthrodeses - Left     Rachelle Narayanan is a 80 y.o. female who is being seen in follow-up for left foot post-op visit. When we last saw she we recommended WBAT.  Pain has  improved. Residual pain is localized at midfoot with minimal radiating and described as mild and dull. Also notes some soreness of right midfoot but not as severe as left foot prior to surgery.      Pain/symptom timing:  Worse during the day when active  Pain/symptom context:  Worse with activites and work  Pain/symptom modifying factors:  Rest makes better, activities make worse  Pain/symptom associated signs/symptoms:  none    Prior treatment   NSAIDsYes   Injections No   Bracing/Orthotics Yes    Physical Therapy No     Orthopedic Surgical History:   See below     Past Medical, Surgical and Social History:  Past Medical History:  has a past medical history of Arthritis, Cellulitis of face (10/16/2019), Colon polyp, Family history of colon cancer in mother, Functional murmur, Hypercalcemia (2016), Hypertension, Mitral valve prolapse (2021), Osteoporosis, Screening for breast cancer (2018), SOB (shortness of breath) (2020), and Viral upper respiratory tract infection (10/30/2018).  Problem List: does not have any pertinent problems on file.  Past Surgical History:  has a past surgical history that includes  section; Toe Surgery (Bilateral); Flexible sigmoidoscopy; Tonsillectomy; pr colonoscopy flx dx w/collj spec when pfrmd (N/A, 10/10/2016); Mitral valve repair; Cardiac surgery; FL guided needle plac bx/asp/inj (2024); Bunionectomy (Bilateral); Implant; pr parathyroidectomy/exploration parathyroids (Bilateral, 10/01/2024); chg assay of parathormone (N/A, 10/01/2024); pr ionm 1 on 1 in or w/attendance each 15 minutes (N/A, 10/01/2024); Colonoscopy; and pr arthrd midtarsl/tarsometatarsal mult/transvrs (Left, 2024).  Family History: family history includes Aneurysm in her father; Cancer in her mother; Colon cancer (age of onset: 73) in her mother; No Known Problems in her daughter, maternal grandfather, maternal grandmother, paternal aunt, paternal aunt, paternal aunt, paternal aunt, paternal grandfather, paternal grandmother, sister, son, and son.  Social History:  reports that she has never smoked. She has been exposed to tobacco smoke. She has never used smokeless tobacco. She reports that she does not currently use alcohol. She reports that she does not use drugs.  Current Medications: has a current medication list which includes the following prescription(s): amoxicillin, ascorbic acid,  "atorvastatin, coenzyme q10, ergocalciferol, glucosamine chondr 1500 complx, losartan, metoprolol succinate, torsemide, vitamin b-12, aspirin, denosumab, and diclofenac sodium.  Allergies: is allergic to poison ivy extract.     Review of Systems:  General- denies fever/chills  HEENT- denies hearing loss or sore throat  Eyes- denies eye pain or visual disturbances, denies red eyes  Respiratory- denies cough or SOB  Cardio- denies chest pain or palpitations  GI- denies abdominal pain  Endocrine- denies urinary frequency  Urinary- denies pain with urination  Musculoskeletal- Negative except noted above  Skin- denies rashes or wounds  Neurological- denies dizziness or headache  Psychiatric- denies anxiety or difficulty concentrating    Physical Exam:   Ht 4' 9\" (1.448 m)   Wt 68.5 kg (151 lb)   BMI 32.68 kg/m²   General/Constitutional: No apparent distress: well-nourished and well developed.  Eyes: normal ocular motion  Lymphatic: No appreciable lymphadenopathy  Respiratory: Non-labored breathing  Vascular: No edema, swelling or tenderness, except as noted in detailed exam.  Integumentary: No impressive skin lesions present, except as noted in detailed exam.  Neuro: No ataxia or tremors noted  Psych: Normal mood and affect, oriented to person, place and time. Appropriate affect.  Musculoskeletal: Normal, except as noted in detailed exam and in HPI.    Examination    Left    Gait Normal   Musculoskeletal No ttp     Skin Normal.  Well-healed incisions.    Nails Normal    Range of Motion  20 degrees dorsiflexion, 30 degrees plantarflexion  Subtalar motion: normal    Stability Stable    Muscle Strength 5/5 tibialis anterior  5/5 gastrocnemius-soleus  5/5 posterior tibialis  5/5 peroneal/eversion strength  5/5 EHL  5/5 FHL    Neurologic Normal    Sensation  Intact to light touch throughout sural, saphenous, superficial peroneal, deep peroneal and medial/lateral plantar nerve distributions.  Denver-Elizabeth 5.07 filament " (10g) testing  deferred.    Cardiovascular Brisk capillary refill < 2 seconds,intact DP and PT pulses    Special Tests None      Imaging Studies:   3 views of the Left foot were obtained, reviewed and interpreted independently which demonstrate hardware in expected position without evidence of failure or loosening; adequate fusion of 2nd and 3rd TMT joints.. Reviewed by me personally.    2 views of right foot were obtained, reviewed and interpreted independently which demonstrate 2nd and 3rd TMT joint osteoarthritis      Scribe Attestation      I,:  Sheryl Lu PA-C am acting as a scribe while in the presence of the attending physician.:       I,:  James R Lachman, MD personally performed the services described in this documentation    as scribed in my presence.:               James R. Lachman, MD  Foot & Ankle Surgery   Department of Orthopaedic Surgery  [facility]      I personally performed the service.    James R. Lachman, MD

## 2025-05-14 ENCOUNTER — HOSPITAL ENCOUNTER (OUTPATIENT)
Dept: RADIOLOGY | Facility: HOSPITAL | Age: 80
Discharge: HOME | End: 2025-05-14
Attending: PHYSICIAN ASSISTANT
Payer: COMMERCIAL

## 2025-05-14 DIAGNOSIS — M19.071 ARTHRITIS OF RIGHT MIDFOOT: ICD-10-CM

## 2025-05-14 PROCEDURE — 77002 NEEDLE LOCALIZATION BY XRAY: CPT

## 2025-05-14 PROCEDURE — 20610 DRAIN/INJ JOINT/BURSA W/O US: CPT

## 2025-05-14 PROCEDURE — 20605 DRAIN/INJ JOINT/BURSA W/O US: CPT

## 2025-05-14 RX ORDER — BETAMETHASONE SODIUM PHOSPHATE AND BETAMETHASONE ACETATE 3; 3 MG/ML; MG/ML
30 INJECTION, SUSPENSION INTRA-ARTICULAR; INTRALESIONAL; INTRAMUSCULAR; SOFT TISSUE ONCE
Status: COMPLETED | OUTPATIENT
Start: 2025-05-14 | End: 2025-05-14

## 2025-05-14 RX ORDER — ROPIVACAINE HYDROCHLORIDE 2 MG/ML
10 INJECTION, SOLUTION EPIDURAL; INFILTRATION; PERINEURAL ONCE
Status: COMPLETED | OUTPATIENT
Start: 2025-05-14 | End: 2025-05-14

## 2025-05-14 RX ORDER — LIDOCAINE HYDROCHLORIDE 10 MG/ML
5 INJECTION, SOLUTION EPIDURAL; INFILTRATION; INTRACAUDAL; PERINEURAL
Status: COMPLETED | OUTPATIENT
Start: 2025-05-14 | End: 2025-05-14

## 2025-05-14 RX ADMIN — LIDOCAINE HYDROCHLORIDE 2 ML: 10 INJECTION, SOLUTION EPIDURAL; INFILTRATION; INTRACAUDAL at 13:46

## 2025-05-14 RX ADMIN — ROPIVACAINE HYDROCHLORIDE 1 ML: 2 INJECTION, SOLUTION EPIDURAL; INFILTRATION at 13:46

## 2025-05-14 RX ADMIN — IOHEXOL 0.5 ML: 300 INJECTION, SOLUTION INTRAVENOUS at 13:46

## 2025-05-14 RX ADMIN — BETAMETHASONE SODIUM PHOSPHATE AND BETAMETHASONE ACETATE 6 MG: 3; 3 INJECTION, SUSPENSION INTRA-ARTICULAR; INTRALESIONAL; INTRAMUSCULAR at 13:46

## 2025-05-19 PROBLEM — I35.1 NONRHEUMATIC AORTIC VALVE INSUFFICIENCY: Status: ACTIVE | Noted: 2025-05-19

## 2025-05-19 NOTE — PROGRESS NOTES
Cardiology Follow-up    Rachelle Narayanan  [id number]  1945  [facility]  [address]  [place] [address]  Phone#  [phone]  Fax#  [phone]      1. MARAVILLA (dyspnea on exertion)  NM myocardial perfusion spect (rx stress and/or rest)      2. Mitral valve prolapse        3. S/P MVR (mitral valve repair)        4. Nonrheumatic aortic valve insufficiency        5. Benign essential hypertension        6. Dyslipidemia            Discussion/Summary:  Mrs. Narayanan is a pleasant 80-year-old female who presents to the office today for routine follow-up.      Since her last visit she has noted some recurrent shortness of breath with exertion.  I have asked that she undergo a pharmacologic nuclear stress test for further evaluation.  She will not be able to exercise on a treadmill due to recent foot surgery.    Her blood pressure is well-controlled in the office today.  No changes were made to her medication regimen.  A low-salt diet was reinforced.    Her most recent lipids do reveal acceptable numbers on her current statin regimen to which no changes were advised.    She had an echocardiogram at our institution last year and then a repeat at [facility] a short while later.  The echocardiogram done here revealed a mitral valve repair with no significant transvalvular gradient, no significant mitral regurgitation with moderate aortic insufficiency.  The repeat done at [facility] a short while reported mild aortic insufficiency.  Nonetheless no intervention is required and this will require ongoing surveillance with echoes.  She continues to follow-up with her cardiothoracic surgeon at [facility] as well.  She was reminded of the need for antibiotics prior to the dentist.    I will see her back in the office in six months or sooner if deemed necessary.    History of Present Illness:  Mrs. Narayanan is a  maame 80-year-old female who presents to the office today for routine follow-up.    Since her last visit she underwent surgery on her foot and also her parathyroid gland.  She reports her shortness of breath with exertion has returned.  She notes this going up the steps.  She denies any exertional chest pain with the activity she performs.   She denies any progressive lower extremity edema, paroxysmal nocturnal dyspnea, orthopnea, acute weight gain or increasing abdominal girth.  She denies syncope or presyncope.  She denies palpitations or symptoms of claudication.    Patient Active Problem List   Diagnosis    Benign essential hypertension    Vitamin D insufficiency    Dyslipidemia    Other osteoporosis without current pathological fracture    Seasonal allergies    Varicose veins of left lower extremity with pain    Bilateral lower extremity edema    S/P MVR (mitral valve repair)    Mitral valve prolapse    Age-related osteoporosis without current pathological fracture    Cavovarus deformity of foot, acquired, left    Pain of left hip    Chronic left-sided low back pain without sciatica    History of parathyroidectomy    Parathyroid adenoma    Lung nodule seen on imaging study    Arthritis of left midfoot    Nonrheumatic aortic valve insufficiency     Past Medical History:   Diagnosis Date    Arthritis     Cellulitis of face 10/16/2019    Bilateral nares    Colon polyp     Family history of colon cancer in mother     Functional murmur     Hypercalcemia 11/21/2016    Hypertension     Dx'd in 2003, controlled     Mitral valve prolapse 04/2021    Osteoporosis     Screening for breast cancer 12/05/2018    SOB (shortness of breath) 07/31/2020    Viral upper respiratory tract infection 10/30/2018     Social History     Socioeconomic History    Marital status: /Civil Union     Spouse name: Not on file    Number of children: Not on file    Years of education: Not on file    Highest education level: Not on file    Occupational History    Not on file   Tobacco Use    Smoking status: Never     Passive exposure: Past    Smokeless tobacco: Never   Vaping Use    Vaping status: Never Used   Substance and Sexual Activity    Alcohol use: Not Currently    Drug use: No    Sexual activity: Not Currently     Partners: Male     Birth control/protection: None   Other Topics Concern    Not on file   Social History Narrative    Caffeine use via coffee, 1 serv/day    Exercises regularly, walking      Social Drivers of Health     Financial Resource Strain: Low Risk  (5/11/2023)    Overall Financial Resource Strain (CARDIA)     Difficulty of Paying Living Expenses: Not hard at all   Food Insecurity: No Food Insecurity (10/2/2024)    Nursing - Inadequate Food Risk Classification     Worried About Running Out of Food in the Last Year: Never true     Ran Out of Food in the Last Year: Never true     Ran Out of Food in the Last Year: Not on file   Transportation Needs: No Transportation Needs (10/2/2024)    PRAPARE - Transportation     Lack of Transportation (Medical): No     Lack of Transportation (Non-Medical): No   Physical Activity: Not on file   Stress: Not on file   Social Connections: Not on file   Intimate Partner Violence: Not on file   Housing Stability: Low Risk  (10/2/2024)    Housing Stability Vital Sign     Unable to Pay for Housing in the Last Year: No     Number of Times Moved in the Last Year: 1     Homeless in the Last Year: No      Family History   Problem Relation Age of Onset    Colon cancer Mother 73    Cancer Mother     Aneurysm Father     No Known Problems Sister     No Known Problems Paternal Aunt     No Known Problems Paternal Aunt     No Known Problems Paternal Aunt     No Known Problems Paternal Aunt     No Known Problems Maternal Grandmother     No Known Problems Maternal Grandfather     No Known Problems Paternal Grandmother     No Known Problems Paternal Grandfather     No Known Problems Daughter     No Known  Problems Son     No Known Problems Son      Past Surgical History:   Procedure Laterality Date    BUNIONECTOMY Bilateral     CARDIAC SURGERY       SECTION      x2,  &     CHG ASSAY OF PARATHORMONE N/A 10/01/2024    Procedure: INTRAOPERATIVE PTH AND LARYNGEAL NEVE MONITORING;  Surgeon: Luis Miguel Costa MD;  Location: [facility];  Service: Surgical Oncology    COLONOSCOPY      DENTAL IMPLANT      FL GUIDED NEEDLE PLAC BX/ASP/INJ  2024    FL INJECTION RIGHT SHOULDER (NON ARTHROGRAM)  2025    FLEXIBLE SIGMOIDOSCOPY      MITRAL VALVE REPAIR          TX ARTHRD MIDTARSL/TARSOMETATARSAL MULT/TRANSVRS Left 2024    Procedure: Left 2nd and 3rd TMT joint arthrodeses;  Surgeon: James R Lachman, MD;  [facility];  Service: Orthopedics    TX COLONOSCOPY FLX DX W/COLLJ SPEC WHEN PFRMD N/A 10/10/2016    Procedure: COLONOSCOPY;  Surgeon: Patsy Escobedo MD;  [facility];  Service: Gastroenterology    TX IONM 1 ON 1 IN OR W/ATTENDANCE EACH 15 MINUTES N/A 10/01/2024    Procedure: INTRAOPERATIVE PTH AND LARYNGEAL NEVE MONITORING;  Surgeon: Luis Miguel Costa MD;  [facility];  Service: Surgical Oncology    TX PARATHYROIDECTOMY/EXPLORATION PARATHYROIDS Bilateral 10/01/2024    Procedure: RESECTION OF RIGHT AND LEFT PARATHYROID GLANDS, POSSIBLE FOUR GLAND PARATHYROID EXPLORATION;  Surgeon: Luis Miguel Costa MD;  [facility];  Service: Surgical Oncology    TOE SURGERY Bilateral     hammer toe correction surgery    TONSILLECTOMY      with Adenoidectomy       Current Outpatient Medications:     ascorbic acid (VITAMIN C) 250 mg tablet, Take 250 mg by mouth in the morning., Disp: , Rfl:     aspirin (ECOTRIN LOW STRENGTH) 81 mg EC tablet, Take 1 tablet (81 mg total) by mouth 2 (two) times a day (Patient taking differently: Take 81 mg by mouth daily), Disp: 84 tablet, Rfl: 0    atorvastatin (LIPITOR) 20 mg tablet, Take 1 tablet (20 mg total) by mouth  "daily, Disp: 100 tablet, Rfl: 3    Coenzyme Q10 10 MG capsule, Take 1 capsule (10 mg total) by mouth daily, Disp: 90 capsule, Rfl: 3    denosumab (PROLIA) 60 mg/mL, Inject 1 mL (60 mg total) under the skin once for 1 dose, Disp: 1 mL, Rfl: 1    ergocalciferol (VITAMIN D2) 50,000 units, Take 1 capsule (50,000 Units total) by mouth once a week, Disp: 12 capsule, Rfl: 3    Glucosamine-Chondroit-Vit C-Mn (Glucosamine Chondr 1500 Complx) CAPS, Take 1 capsule by mouth daily after lunch, Disp: 90 capsule, Rfl: 1    losartan (COZAAR) 50 mg tablet, Take 1 tablet (50 mg total) by mouth every morning, Disp: 100 tablet, Rfl: 1    metoprolol succinate (TOPROL-XL) 50 mg 24 hr tablet, TAKE ONE TABLET BY MOUTH EVERY DAY IN THE EVENING, Disp: 90 tablet, Rfl: 1    torsemide (DEMADEX) 10 mg tablet, TAKE ONE TABLET BY MOUTH EVERY DAY, Disp: 90 tablet, Rfl: 1    vitamin B-12 (VITAMIN B-12) 1,000 mcg tablet, Take 1 tablet (1,000 mcg total) by mouth daily, Disp: 90 tablet, Rfl: 0    Diclofenac Sodium (VOLTAREN) 1 %, Apply 2 g topically 4 (four) times a day (Patient not taking: Reported on 4/29/2025), Disp: 200 g, Rfl: 3  Allergies   Allergen Reactions    Poison Ivy Extract Rash         Imaging: No results found.  Review of Systems:  Review of Systems      Vitals:    05/20/25 1559   BP: 110/68   BP Location: Left arm   Patient Position: Sitting   Cuff Size: Adult   Pulse: 97   SpO2: 97%   Weight: 68.4 kg (150 lb 12.8 oz)   Height: 4' 9\" (1.448 m)           Vitals:    05/20/25 1559   Weight: 68.4 kg (150 lb 12.8 oz)           Height: 4' 9\" (144.8 cm)     Physical Exam:  General:  Alert and cooperative, appears stated age  HEENT:  PERRLA, EOMI, no scleral icterus, no conjunctival pallor  Neck:  No lymphadenopathy, no thyromegaly, no carotid bruits, no elevated JVP  Heart:  Regular rate and rhythm, normal S1/S2, no S3/S4, no murmur  Lungs:  Clear to auscultation bilaterally   Abdomen:  Soft, non-tender, positive bowel sounds, no rebound or " guarding,   no organomegaly   Extremities: Positive pedal edema   Vascular:  2+ pedal pulses  Skin:  No rashes or lesions on exposed skin  Neurologic:  Cranial nerves II-XII grossly intact without focal deficits

## 2025-05-20 ENCOUNTER — OFFICE VISIT (OUTPATIENT)
Dept: CARDIOLOGY CLINIC | Facility: CLINIC | Age: 80
End: 2025-05-20
Payer: COMMERCIAL

## 2025-05-20 VITALS
DIASTOLIC BLOOD PRESSURE: 68 MMHG | BODY MASS INDEX: 32.53 KG/M2 | HEART RATE: 97 BPM | HEIGHT: 57 IN | OXYGEN SATURATION: 97 % | SYSTOLIC BLOOD PRESSURE: 110 MMHG | WEIGHT: 150.8 LBS

## 2025-05-20 DIAGNOSIS — Z98.890 S/P MVR (MITRAL VALVE REPAIR): ICD-10-CM

## 2025-05-20 DIAGNOSIS — E78.5 DYSLIPIDEMIA: ICD-10-CM

## 2025-05-20 DIAGNOSIS — I10 BENIGN ESSENTIAL HYPERTENSION: ICD-10-CM

## 2025-05-20 DIAGNOSIS — I34.1 MITRAL VALVE PROLAPSE: ICD-10-CM

## 2025-05-20 DIAGNOSIS — I35.1 NONRHEUMATIC AORTIC VALVE INSUFFICIENCY: ICD-10-CM

## 2025-05-20 DIAGNOSIS — R06.09 DOE (DYSPNEA ON EXERTION): ICD-10-CM

## 2025-05-20 PROCEDURE — 99214 OFFICE O/P EST MOD 30 MIN: CPT | Performed by: INTERNAL MEDICINE

## 2025-05-27 ENCOUNTER — HOSPITAL ENCOUNTER (OUTPATIENT)
Dept: NON INVASIVE DIAGNOSTICS | Facility: HOSPITAL | Age: 80
Discharge: HOME | End: 2025-05-27
Attending: INTERNAL MEDICINE
Payer: COMMERCIAL

## 2025-05-27 ENCOUNTER — HOSPITAL ENCOUNTER (OUTPATIENT)
Facility: HOSPITAL | Age: 80
Discharge: HOME | End: 2025-05-27
Attending: INTERNAL MEDICINE
Payer: COMMERCIAL

## 2025-05-27 VITALS — BODY MASS INDEX: 34.71 KG/M2 | WEIGHT: 150 LBS | HEIGHT: 55 IN

## 2025-05-27 DIAGNOSIS — R06.09 DOE (DYSPNEA ON EXERTION): ICD-10-CM

## 2025-05-27 LAB
MAX HR PERCENT: 62 %
MAX HR: 89 BPM
RATE PRESSURE PRODUCT: NORMAL
SL CV REST NUCLEAR ISOTOPE DOSE: 10.5 MCI
SL CV STRESS NUCLEAR ISOTOPE DOSE: 31.9 MCI
SL CV STRESS RECOVERY BP: NORMAL MMHG
SL CV STRESS RECOVERY HR: 75 BPM
SL CV STRESS RECOVERY O2 SAT: 98 %
SPECT HRT GATED+EF W RNC IV: 81 %
STRESS ANGINA INDEX: 1
STRESS BASELINE BP: NORMAL MMHG
STRESS BASELINE HR: 57 BPM
STRESS O2 SAT REST: 98 %
STRESS PEAK HR: 85 BPM
STRESS POST ESTIMATED WORKLOAD: 1 METS
STRESS POST O2 SAT PEAK: 98 %
STRESS POST PEAK BP: 156 MMHG

## 2025-05-27 PROCEDURE — 93016 CV STRESS TEST SUPVJ ONLY: CPT | Performed by: INTERNAL MEDICINE

## 2025-05-27 PROCEDURE — 93017 CV STRESS TEST TRACING ONLY: CPT

## 2025-05-27 PROCEDURE — 93018 CV STRESS TEST I&R ONLY: CPT | Performed by: INTERNAL MEDICINE

## 2025-05-27 PROCEDURE — 78452 HT MUSCLE IMAGE SPECT MULT: CPT | Performed by: INTERNAL MEDICINE

## 2025-05-27 PROCEDURE — 78452 HT MUSCLE IMAGE SPECT MULT: CPT

## 2025-05-27 PROCEDURE — A9502 TC99M TETROFOSMIN: HCPCS

## 2025-05-27 RX ORDER — REGADENOSON 0.08 MG/ML
0.4 INJECTION, SOLUTION INTRAVENOUS ONCE
Status: COMPLETED | OUTPATIENT
Start: 2025-05-27 | End: 2025-05-27

## 2025-05-27 RX ADMIN — REGADENOSON 0.4 MG: 0.08 INJECTION, SOLUTION INTRAVENOUS at 10:43

## 2025-05-28 ENCOUNTER — RESULTS FOLLOW-UP (OUTPATIENT)
Dept: CARDIOLOGY CLINIC | Facility: CLINIC | Age: 80
End: 2025-05-28

## 2025-05-28 LAB
CHEST PAIN STATEMENT: NORMAL
MAX DIASTOLIC BP: 88 MMHG
MAX PREDICTED HEART RATE: 142 BPM
PROTOCOL NAME: NORMAL
REASON FOR TERMINATION: NORMAL
STRESS POST EXERCISE DUR MIN: 3 MIN
STRESS POST EXERCISE DUR SEC: 1 SEC
STRESS POST PEAK HR: 89 BPM
STRESS POST PEAK SYSTOLIC BP: 156 MMHG
TARGET HR FORMULA: NORMAL
TEST INDICATION: NORMAL

## 2025-07-25 ENCOUNTER — TELEPHONE (OUTPATIENT)
Dept: SURGICAL ONCOLOGY | Facility: CLINIC | Age: 80
End: 2025-07-25

## 2025-07-25 NOTE — TELEPHONE ENCOUNTER
APPOINTMENT RESCHEDULE   DATE OF CALL 7/25/2025       PROVIDER NAME  Dr. Costa     ORIGINAL APPOINTMENT DATE & TIME 8/22/2025 @ 8:45 am     CALL STATUS Called and left message     NEW APPOINTMENT DATE & TIME      REASON FOR RESCHEDULE Dr. Costa will be in surgery that day     NAME OF PERSON YOU SPOKE WITH

## 2025-07-29 ENCOUNTER — RA CDI HCC (OUTPATIENT)
Dept: OTHER | Facility: HOSPITAL | Age: 80
End: 2025-07-29

## 2025-08-04 DIAGNOSIS — I10 PRIMARY HYPERTENSION: ICD-10-CM

## 2025-08-05 ENCOUNTER — OFFICE VISIT (OUTPATIENT)
Dept: FAMILY MEDICINE CLINIC | Facility: CLINIC | Age: 80
End: 2025-08-05
Payer: COMMERCIAL

## 2025-08-05 VITALS
TEMPERATURE: 98.1 F | DIASTOLIC BLOOD PRESSURE: 70 MMHG | HEIGHT: 55 IN | BODY MASS INDEX: 32.4 KG/M2 | WEIGHT: 140 LBS | RESPIRATION RATE: 15 BRPM | HEART RATE: 67 BPM | OXYGEN SATURATION: 98 % | SYSTOLIC BLOOD PRESSURE: 124 MMHG

## 2025-08-05 DIAGNOSIS — R79.89 ELEVATED PARATHYROID HORMONE: ICD-10-CM

## 2025-08-05 DIAGNOSIS — Z00.01 ENCOUNTER FOR GENERAL ADULT MEDICAL EXAMINATION WITH ABNORMAL FINDINGS: Primary | ICD-10-CM

## 2025-08-05 DIAGNOSIS — M19.90 ARTHRITIS: ICD-10-CM

## 2025-08-05 DIAGNOSIS — M81.8 IDIOPATHIC OSTEOPOROSIS: ICD-10-CM

## 2025-08-05 DIAGNOSIS — I10 PRIMARY HYPERTENSION: ICD-10-CM

## 2025-08-05 DIAGNOSIS — Z98.890 S/P MVR (MITRAL VALVE REPAIR): ICD-10-CM

## 2025-08-05 DIAGNOSIS — E55.9 VITAMIN D DEFICIENCY: ICD-10-CM

## 2025-08-05 DIAGNOSIS — I34.1 MITRAL VALVE PROLAPSE: ICD-10-CM

## 2025-08-05 DIAGNOSIS — E78.5 DYSLIPIDEMIA: ICD-10-CM

## 2025-08-05 DIAGNOSIS — E04.1 THYROID NODULE: ICD-10-CM

## 2025-08-05 LAB — SL AMB POCT HEMOGLOBIN AIC: 5.6 (ref ?–6.5)

## 2025-08-05 PROCEDURE — G2211 COMPLEX E/M VISIT ADD ON: HCPCS | Performed by: INTERNAL MEDICINE

## 2025-08-05 PROCEDURE — 83036 HEMOGLOBIN GLYCOSYLATED A1C: CPT | Performed by: INTERNAL MEDICINE

## 2025-08-05 PROCEDURE — G0439 PPPS, SUBSEQ VISIT: HCPCS | Performed by: INTERNAL MEDICINE

## 2025-08-05 PROCEDURE — 99214 OFFICE O/P EST MOD 30 MIN: CPT | Performed by: INTERNAL MEDICINE

## 2025-08-05 RX ORDER — METOPROLOL SUCCINATE 50 MG/1
50 TABLET, EXTENDED RELEASE ORAL EVERY EVENING
Qty: 90 TABLET | Refills: 1 | Status: SHIPPED | OUTPATIENT
Start: 2025-08-05

## 2025-08-05 RX ORDER — TORSEMIDE 10 MG/1
10 TABLET ORAL DAILY
Qty: 90 TABLET | Refills: 1 | Status: SHIPPED | OUTPATIENT
Start: 2025-08-05

## 2025-08-05 RX ORDER — ERGOCALCIFEROL 1.25 MG/1
50000 CAPSULE, LIQUID FILLED ORAL WEEKLY
Qty: 12 CAPSULE | Refills: 3 | Status: SHIPPED | OUTPATIENT
Start: 2025-08-05

## 2025-08-05 RX ORDER — ATORVASTATIN CALCIUM 20 MG/1
20 TABLET, FILM COATED ORAL DAILY
Qty: 100 TABLET | Refills: 3 | Status: SHIPPED | OUTPATIENT
Start: 2025-08-05

## 2025-08-05 RX ORDER — LOSARTAN POTASSIUM 50 MG/1
50 TABLET ORAL EVERY MORNING
Qty: 100 TABLET | Refills: 1 | Status: SHIPPED | OUTPATIENT
Start: 2025-08-05

## 2025-08-05 RX ORDER — PREDNISONE 5 MG/1
5 TABLET ORAL
Qty: 30 TABLET | Refills: 0 | Status: SHIPPED | OUTPATIENT
Start: 2025-08-05

## 2025-08-05 RX ORDER — LOSARTAN POTASSIUM 50 MG/1
50 TABLET ORAL EVERY MORNING
Qty: 100 TABLET | Refills: 1 | OUTPATIENT
Start: 2025-08-05

## 2025-08-06 ENCOUNTER — TELEPHONE (OUTPATIENT)
Age: 80
End: 2025-08-06

## 2025-08-06 DIAGNOSIS — R91.1 LUNG NODULE: Primary | ICD-10-CM

## 2025-08-12 ENCOUNTER — HOSPITAL ENCOUNTER (OUTPATIENT)
Dept: ULTRASOUND IMAGING | Facility: HOSPITAL | Age: 80
Discharge: HOME | End: 2025-08-12
Payer: COMMERCIAL

## 2025-08-12 ENCOUNTER — APPOINTMENT (OUTPATIENT)
Dept: LAB | Facility: AMBULARY SURGERY CENTER | Age: 80
End: 2025-08-12
Payer: COMMERCIAL

## 2025-08-14 ENCOUNTER — HOSPITAL ENCOUNTER (OUTPATIENT)
Dept: CT IMAGING | Facility: HOSPITAL | Age: 80
Discharge: HOME | End: 2025-08-14
Attending: SURGERY
Payer: COMMERCIAL

## 2025-08-14 ENCOUNTER — TELEPHONE (OUTPATIENT)
Age: 80
End: 2025-08-14

## (undated) DEVICE — DRILL BIT, AO, SCALED: Brand: VARIAX

## (undated) DEVICE — EXOFIN FUSION SKIN CLOSURE SYSTEM, 30CM: Brand: EXOFIN FUSION SKIN CLOSURE SYSTEM, 30CM

## (undated) DEVICE — SPLINT 5 X 30 IN FAST SET PLASTER

## (undated) DEVICE — ADHESIVE SKIN CLOSURE SYS EXOFIN FUSION 22CM

## (undated) DEVICE — LIGACLIP MCA MULTIPLE CLIP APPLIERS, 20 MEDIUM CLIPS: Brand: LIGACLIP

## (undated) DEVICE — STEINMANN PIN, SMOOTH
Type: IMPLANTABLE DEVICE | Site: FOOT | Status: NON-FUNCTIONAL
Brand: VARIAX
Removed: 2024-11-07

## (undated) DEVICE — OCCLUSIVE GAUZE STRIP,3% BISMUTH TRIBROMOPHENATE IN PETROLATUM BLEND: Brand: XEROFORM

## (undated) DEVICE — HEMOSTAT POWDER ADSORB SURGICEL 3GM

## (undated) DEVICE — DRILL BIT, AO DIA2.6MM X 135MM, SCALED: Brand: VARIAX

## (undated) DEVICE — 3M™ DURAPORE™ SURGICAL TAPE 1538-1, 1 INCH X 10 YARD (2,5CM X 9,1M), 12 ROLLS/BOX: Brand: 3M™ DURAPORE™

## (undated) DEVICE — SUT VICRYL 2-0 SH 27 IN UNDYED J417H

## (undated) DEVICE — SPONGE SCRUB 4 PCT CHLORHEXIDINE

## (undated) DEVICE — CAST PADDING 6IN UNSTERILE

## (undated) DEVICE — PADDING CAST 4 IN  COTTON STRL

## (undated) DEVICE — HOLDING PIN
Type: IMPLANTABLE DEVICE | Site: FOOT | Status: NON-FUNCTIONAL
Brand: ANCHORAGE
Removed: 2024-11-07

## (undated) DEVICE — CANNULATED DRILL: Brand: FIXOS

## (undated) DEVICE — GLOVE INDICATOR PI UNDERGLOVE SZ 8 BLUE

## (undated) DEVICE — SUT VICRYL 3-0 SH 27 IN J416H

## (undated) DEVICE — CUFF TOURNIQUET 30 X 4 IN QUICK CONNECT DISP 1BLA

## (undated) DEVICE — PACK UNIVERSAL NECK

## (undated) DEVICE — SUT ETHILON 3-0 PS-1 18 IN 1663G

## (undated) DEVICE — GLOVE SRG BIOGEL ORTHOPEDIC 7.5

## (undated) DEVICE — 3M™ MICROFOAM™ SURGICAL TAPE 4 ROLLS/CARTON 6 CARTONS/CASE 1528-3: Brand: 3M™ MICROFOAM™

## (undated) DEVICE — LIGACLIP MCA MULTIPLE CLIP APPLIERS, 20 SMALL CLIPS: Brand: LIGACLIP

## (undated) DEVICE — GLOVE SRG BIOGEL 8

## (undated) DEVICE — SPONGE CHERRY 1/2IN

## (undated) DEVICE — DRAPE SHEET THREE QUARTER

## (undated) DEVICE — INTENDED FOR TISSUE SEPARATION, AND OTHER PROCEDURES THAT REQUIRE A SHARP SURGICAL BLADE TO PUNCTURE OR CUT.: Brand: BARD-PARKER ® CARBON RIB-BACK BLADES

## (undated) DEVICE — PROBE 8225101 5PK STD PRASS FL TIP ROHS

## (undated) DEVICE — SUT VICRYL 2-0 CT-2 18 IN J726D

## (undated) DEVICE — ABDOMINAL PAD: Brand: DERMACEA

## (undated) DEVICE — SUT MONOCRYL 4-0 PS-2 18 IN Y496G

## (undated) DEVICE — DRAPE SURGIKIT SADDLE BAG

## (undated) DEVICE — HARMONIC FOCUS SHEARS 9CM LENGTH + ADAPTIVE TISSUE TECHNOLOGY FOR USE WITH BLUE HAND PIECE ONLY: Brand: HARMONIC FOCUS

## (undated) DEVICE — GLOVE SRG BIOGEL 7.5

## (undated) DEVICE — PENCIL ELECTROSURG E-Z CLEAN -0035H

## (undated) DEVICE — PACK UNIVERSAL DRAPES SUB-Q ICD

## (undated) DEVICE — BIPOLAR CORD DISP

## (undated) DEVICE — 4-PORT MANIFOLD: Brand: NEPTUNE 2

## (undated) DEVICE — SURGICEL 2 X 3

## (undated) DEVICE — TELFA NON-ADHERENT ABSORBENT DRESSING: Brand: TELFA

## (undated) DEVICE — ANTIBACTERIAL UNDYED BRAIDED (POLYGLACTIN 910), SYNTHETIC ABSORBABLE SUTURE: Brand: COATED VICRYL

## (undated) DEVICE — UNTHREADED GUIDE WIRE
Type: IMPLANTABLE DEVICE | Site: FOOT | Status: NON-FUNCTIONAL
Brand: FIXOS
Removed: 2024-11-07

## (undated) DEVICE — PENCIL SMOKE EVAC TELESCOPING W/TUBING

## (undated) DEVICE — ACE WRAP 6 IN UNSTERILE

## (undated) DEVICE — BETHLEHEM UNIVERSAL  MIONR EXT: Brand: CARDINAL HEALTH

## (undated) DEVICE — CHLORAPREP HI-LITE 26ML ORANGE

## (undated) DEVICE — GAUZE SPONGES,16 PLY: Brand: CURITY